# Patient Record
Sex: FEMALE | Race: WHITE | NOT HISPANIC OR LATINO | Employment: FULL TIME | URBAN - METROPOLITAN AREA
[De-identification: names, ages, dates, MRNs, and addresses within clinical notes are randomized per-mention and may not be internally consistent; named-entity substitution may affect disease eponyms.]

---

## 2019-12-18 ENCOUNTER — OFFICE VISIT (OUTPATIENT)
Dept: DERMATOLOGY | Facility: CLINIC | Age: 29
End: 2019-12-18
Payer: COMMERCIAL

## 2019-12-18 DIAGNOSIS — Z13.89 SCREENING FOR SKIN CONDITION: ICD-10-CM

## 2019-12-18 DIAGNOSIS — D22.9 NEVUS: Primary | ICD-10-CM

## 2019-12-18 PROCEDURE — 99203 OFFICE O/P NEW LOW 30 MIN: CPT | Performed by: DERMATOLOGY

## 2019-12-18 RX ORDER — CHOLECALCIFEROL (VITAMIN D3) 125 MCG
5 CAPSULE ORAL
COMMUNITY

## 2019-12-18 RX ORDER — PREDNISONE 1 MG/1
TABLET ORAL
COMMUNITY
Start: 2018-09-29

## 2019-12-18 RX ORDER — FLUOXETINE HYDROCHLORIDE 20 MG/1
CAPSULE ORAL
Refills: 30 | COMMUNITY
Start: 2019-11-22 | End: 2020-01-24 | Stop reason: ALTCHOICE

## 2019-12-18 RX ORDER — HYDROXYCHLOROQUINE SULFATE 200 MG/1
200 TABLET, FILM COATED ORAL
COMMUNITY
Start: 2018-09-29

## 2019-12-18 NOTE — PATIENT INSTRUCTIONS
Nevi reviewed the concept of ABCDE and ugly duckling nothing markedly atypical patient reassured    Patient does have some atypical nevi would monitor baseline photograph obtained lesion on the right arm appears to be more vascular nature will keep an eye on this if any further change re-evaluate  Screening for Dermatologic Disorders: Nothing else of concern noted on complete exam follow up in 1 year

## 2019-12-18 NOTE — PROGRESS NOTES
[facility] DERMATOLOGY  [address] [place] [address]  [facility] [address]  [phone]  [phone]     MRN: [id number] : 1990  Encounter: [id number]  Patient Information: Jocelyn Doty  Chief complaint:  Skin cancer checkup atypical mole    History of present illness:  28-year-old female with history of rheumatoid arthritis without previous history of skin cancer but history of lots of sun exposure presents for concerns for potential skin cancer  Patient notes having atypical moles on the back that was noted previously but also has noted a new spot that developed on her right upper arm  History reviewed  No pertinent past medical history  History reviewed  No pertinent surgical history  Social History   Social History     Substance and Sexual Activity   Alcohol Use Yes    Frequency: 2-3 times a week    Drinks per session: 1 or 2    Binge frequency: Never     Social History     Substance and Sexual Activity   Drug Use Not Currently     Social History     Tobacco Use   Smoking Status Never Smoker   Smokeless Tobacco Never Used     History reviewed  No pertinent family history  Meds/Allergies   Allergies   Allergen Reactions    Sulfa Antibiotics        Meds:  Prior to Admission medications    Medication Sig Start Date End Date Taking?  Authorizing Provider   FLUoxetine (PROzac) 20 mg capsule TK 1 C PO QD 19  Yes Historical Provider, MD   hydroxychloroquine (PLAQUENIL) 200 mg tablet Take 200 mg by mouth 18  Yes Historical Provider, MD   Melatonin 5 MG TABS Take 5 mg by mouth   Yes Historical Provider, MD   predniSONE 5 mg tablet TAKE 2-3 TABLETS BY MOUTH EVERY DAY IN THE MORNING FOR 2 TO 3 DAYS AS NEEDED 18  Yes Historical Provider, MD       Subjective:     Review of Systems:    General: negative for - chills, fatigue, fever,  weight gain or weight loss  Psychological: negative for - anxiety, behavioral disorder, concentration difficulties, decreased libido, depression, irritability, memory difficulties, mood swings, sleep disturbances or suicidal ideation  ENT: negative for - hearing difficulties , nasal congestion, nasal discharge, oral lesions, sinus pain, sneezing, sore throat  Allergy and Immunology: negative for - hives, insect bite sensitivity,  Hematological and Lymphatic: negative for - bleeding problems, blood clots,bruising, swollen lymph nodes  Endocrine: negative for - hair pattern changes, hot flashes, malaise/lethargy, mood swings, palpitations, polydipsia/polyuria, skin changes, temperature intolerance or unexpected weight change  Respiratory: negative for - cough, hemoptysis, orthopnea, shortness of breath, or wheezing  Cardiovascular: negative for - chest pain, dyspnea on exertion, edema,  Gastrointestinal: negative for - abdominal pain, nausea/vomiting  Genito-Urinary: negative for - dysuria, incontinence, irregular/heavy menses or urinary frequency/urgency  Musculoskeletal: negative for - gait disturbance, joint pain, joint stiffness, joint swelling, muscle pain, muscular weakness  Dermatological:  As in HPI  Neurological: negative for confusion, dizziness, headaches, impaired coordination/balance, memory loss, numbness/tingling, seizures, speech problems, tremors or weakness       Objective: There were no vitals taken for this visit      Physical Exam:    General Appearance:    Alert, cooperative, no distress   Head:    Normocephalic, without obvious abnormality, atraumatic           Skin:   A full skin exam was performed including scalp, head scalp, eyes, ears, nose, lips, neck, chest, axilla, abdomen, back, buttocks, bilateral upper extremities, bilateral lower extremities, hands, feet, fingers, toes, fingernails, and toenails several pigmented lesions on some larger than 6 mm size on the back with splaying of pigmentation photograph taken numerous other small pigmented lesions all pretty much regular shape and color nothing markedly atypical lesion on the right upper arm appears to be a 3 mm red macule which blanches on pressure nothing else of concern noted           Assessment:     1  Nevus     2  Screening for skin condition           Plan:   Nevi reviewed the concept of ABCDE and ugly duckling nothing markedly atypical patient reassured  Patient does have some atypical nevi would monitor baseline photograph obtained lesion on the right arm appears to be more vascular nature will keep an eye on this if any further change re-evaluate  Screening for Dermatologic Disorders: Nothing else of concern noted on complete exam follow up in 1 year       Kolby Nunez MD  12/18/2019,2:21 PM    Portions of the record may have been created with voice recognition software   Occasional wrong word or "sound a like" substitutions may have occurred due to the inherent limitations of voice recognition software   Read the chart carefully and recognize, using context, where substitutions have occurred

## 2020-01-24 ENCOUNTER — OFFICE VISIT (OUTPATIENT)
Dept: FAMILY MEDICINE CLINIC | Facility: CLINIC | Age: 30
End: 2020-01-24
Payer: COMMERCIAL

## 2020-01-24 VITALS
HEIGHT: 63 IN | OXYGEN SATURATION: 97 % | TEMPERATURE: 98.9 F | SYSTOLIC BLOOD PRESSURE: 110 MMHG | DIASTOLIC BLOOD PRESSURE: 70 MMHG | WEIGHT: 173 LBS | HEART RATE: 96 BPM | BODY MASS INDEX: 30.65 KG/M2

## 2020-01-24 DIAGNOSIS — Z00.00 ANNUAL PHYSICAL EXAM: ICD-10-CM

## 2020-01-24 DIAGNOSIS — Z12.4 SCREENING FOR CERVICAL CANCER: ICD-10-CM

## 2020-01-24 DIAGNOSIS — F33.41 RECURRENT MAJOR DEPRESSIVE DISORDER, IN PARTIAL REMISSION (HCC): ICD-10-CM

## 2020-01-24 DIAGNOSIS — R09.82 PND (POST-NASAL DRIP): ICD-10-CM

## 2020-01-24 DIAGNOSIS — Z76.89 ENCOUNTER TO ESTABLISH CARE: Primary | ICD-10-CM

## 2020-01-24 DIAGNOSIS — M06.09 RHEUMATOID ARTHRITIS OF MULTIPLE SITES WITH NEGATIVE RHEUMATOID FACTOR (HCC): ICD-10-CM

## 2020-01-24 DIAGNOSIS — F41.9 ANXIETY: ICD-10-CM

## 2020-01-24 DIAGNOSIS — Z23 FLU VACCINE NEED: ICD-10-CM

## 2020-01-24 PROCEDURE — 99385 PREV VISIT NEW AGE 18-39: CPT | Performed by: NURSE PRACTITIONER

## 2020-01-24 RX ORDER — VILAZODONE HYDROCHLORIDE 10 MG/1
40 TABLET ORAL
COMMUNITY
End: 2020-02-07 | Stop reason: ALTCHOICE

## 2020-01-24 RX ORDER — FLUTICASONE PROPIONATE 50 MCG
1 SPRAY, SUSPENSION (ML) NASAL DAILY
Qty: 1 BOTTLE | Refills: 3 | Status: SHIPPED | OUTPATIENT
Start: 2020-01-24 | End: 2020-04-21 | Stop reason: SDUPTHER

## 2020-01-24 RX ORDER — TRAZODONE HYDROCHLORIDE 50 MG/1
TABLET ORAL
COMMUNITY
Start: 2020-01-17

## 2020-01-24 RX ORDER — CLONAZEPAM 0.5 MG/1
TABLET ORAL
COMMUNITY
Start: 2020-01-22 | End: 2022-03-01 | Stop reason: ALTCHOICE

## 2020-01-24 NOTE — PROGRESS NOTES
Patient is here to establish care  She was previously being seen in [place] by her family doctor she has a long history of mental health issues  Goes to [employer] [facility], seeing Tuan Mcmillan  For anxiety and depression  SI attempt in   She  Is currently not seeing a counselor but would like a recommendation for one  She was recently started on viibryd and having some sleeping and diarrhea issues  Takes plaquenil daily for RA-- has prednisone PRN  Sees Dr Ranjith Salazar for rheum  Has chronic sinus issues  Uses nasal sprays  Takes claritin  Had recent bloodwork done in October from [facility] by Dr Ranjith Salazar  Pap 10/2018      [facility] [address] [facility]    NAME: Martha Garcia  AGE: 30 y o  SEX: female  : 1990     DATE: 2020     Assessment and Plan:     Problem List Items Addressed This Visit        Musculoskeletal and Integument    Rheumatoid arthritis of multiple sites with negative rheumatoid factor [facility])       Patient is following with rheumatology and doing well currently  Continue current medications  Other    Anxiety       Continue follow-up with mental health care provider  Did put in referral for counseling  Relevant Orders    Ambulatory referral to Behavioral Health    Recurrent major depressive disorder, in partial remission ([facility] [address] )       Doing well on current medications  Continue follow-up with psych nurse practitioner           Relevant Medications    traZODone (DESYREL) 50 mg tablet    vilazodone (VIIBRYD) 10 mg tablet    Other Relevant Orders    Ambulatory referral to [facility]    BMI 30 0-30 9,adult      Other Visit Diagnoses     Encounter to establish care    -  Primary    Flu vaccine need        Annual physical exam        Screening for cervical cancer        Relevant Orders    Ambulatory referral to Obstetrics / Gynecology    PND (post-nasal drip)        Relevant Medications    fluticasone (FLONASE) 50 mcg/act nasal spray          Immunizations and preventive care screenings were discussed with patient today  Appropriate education was printed on patient's after visit summary  Counseling:  · Exercise: the importance of regular exercise/physical activity was discussed  Recommend exercise 3-5 times per week for at least 30 minutes  BMI Counseling: Body mass index is 30 65 kg/m²  The BMI is above normal  Nutrition recommendations include decreasing portion sizes, consuming healthier snacks and reducing intake of cholesterol  Exercise recommendations include moderate physical activity 150 minutes/week and exercising 3-5 times per week  No pharmacotherapy was ordered  Depression Screening and Follow-up Plan: Patient's depression screening was positive with a PHQ-2 score of 4  Their PHQ-9 score was 18  Continue regular follow-up with their mental health provider who is managing their mental health condition(s)  Return in about 1 year (around 2021)  Chief Complaint:     Chief Complaint   Patient presents with    [facility] Care    positive phq9      History of Present Illness:     Adult Annual Physical   Patient here for a comprehensive physical exam  The patient reports problems - needs counselor  Diet and Physical Activity  · Diet/Nutrition: well balanced diet  · Exercise: 3-4 times a week on average        Depression Screening  PHQ-9 Depression Screening    PHQ-9:    Frequency of the following problems over the past two weeks:       Little interest or pleasure in doing things:  2 - more than half the days  Feeling down, depressed, or hopeless:  2 - more than half the days  Trouble falling or staying asleep, or sleeping too much:  2 - more than half the days  Feeling tired or having little energy:  3 - nearly every day  Poor appetite or overeatin - several days  Feeling bad about yourself - or that you are a failure or have let yourself or your family down:  3 - nearly every day  Trouble concentrating on things, such as reading the newspaper or watching television:  3 - nearly every day  Moving or speaking so slowly that other people could have noticed  Or the opposite - being so fidgety or restless that you have been moving around a lot more than usual:  2 - more than half the days  Thoughts that you would be better off dead, or of hurting yourself in some way:  0 - not at all  PHQ-2 Score:  4  PHQ-9 Score:  18       General Health  · Sleep: sleeps well  · Hearing: normal - bilateral   · Vision: wears glasses  · Dental: regular dental visits  /GYN Health  · Last menstrual period: 12/25/2019  · Contraceptive method: barrier methods  · History of STDs?: yes- chlamydia, hpv  Review of Systems:     Review of Systems   Constitutional: Negative  HENT: Positive for congestion, postnasal drip, sinus pressure and sinus pain  Respiratory: Negative for chest tightness, shortness of breath and wheezing  Cardiovascular: Negative for chest pain and palpitations  Gastrointestinal: Positive for diarrhea  Negative for nausea  Genitourinary: Negative  Psychiatric/Behavioral: Positive for dysphoric mood and sleep disturbance  Negative for self-injury and suicidal ideas  The patient is nervous/anxious         Past Medical History:     Past Medical History:   Diagnosis Date    Rheumatoid arthritis ([facility] [address] )       Past Surgical History:     Past Surgical History:   Procedure Laterality Date    APPENDECTOMY      WISDOM TOOTH EXTRACTION        Social History:     Social History     Socioeconomic History    Marital status: /Civil Union     Spouse name: None    Number of children: None    Years of education: None    Highest education level: None   Occupational History    None   Social Needs    Financial resource strain: None    Food insecurity:     Worry: None     Inability: None    Transportation needs:     Medical: None     Non-medical: None   Tobacco Use    Smoking status: Never Smoker    Smokeless tobacco: Never Used   Substance and Sexual Activity    Alcohol use: Yes     Frequency: 2-3 times a week     Drinks per session: 1 or 2     Binge frequency: Never    Drug use: Not Currently    Sexual activity: None   Lifestyle    Physical activity:     Days per week: None     Minutes per session: None    Stress: None   Relationships    Social connections:     Talks on phone: None     Gets together: None     Attends Yazidi service: None     Active member of club or organization: None     Attends meetings of clubs or organizations: None     Relationship status: None    Intimate partner violence:     Fear of current or ex partner: None     Emotionally abused: None     Physically abused: None     Forced sexual activity: None   Other Topics Concern    None   Social History Narrative    None      Family History:     Family History   Problem Relation Age of Onset    Mental illness Mother     Depression Mother     Esophageal cancer Father       Current Medications:     Current Outpatient Medications   Medication Sig Dispense Refill    clonazePAM (KlonoPIN) 0 5 mg tablet TK 1/2 TO 1 T PO D PRA      hydroxychloroquine (PLAQUENIL) 200 mg tablet Take 200 mg by mouth      levonorgestrel (MIRENA) 20 MCG/24HR IUD 1 each by Intrauterine route once      Melatonin 5 MG TABS Take 5 mg by mouth      predniSONE 5 mg tablet TAKE 2-3 TABLETS BY MOUTH EVERY DAY IN THE MORNING FOR 2 TO 3 DAYS AS NEEDED      traZODone (DESYREL) 50 mg tablet       vilazodone (VIIBRYD) 10 mg tablet Take 40 mg by mouth daily with breakfast      fluticasone (FLONASE) 50 mcg/act nasal spray 1 spray into each nostril daily 1 Bottle 3     No current facility-administered medications for this visit  Allergies:      Allergies   Allergen Reactions    Sulfa Antibiotics       Physical Exam:     /70   Pulse 96   Temp 98 9 °F (37 2 °C)   Ht 5' 3" (1 6 m)   Wt 78 5 kg (173 lb)   SpO2 97%   BMI 30 65 kg/m²     Physical Exam   Constitutional: She is oriented to person, place, and time  She appears well-developed and well-nourished  No distress  HENT:   Head: Normocephalic  Right Ear: External ear normal    Left Ear: External ear normal    Nose: Nose normal    Mouth/Throat: Oropharynx is clear and moist  No oropharyngeal exudate  Eyes: Pupils are equal, round, and reactive to light  Conjunctivae and EOM are normal  Right eye exhibits no discharge  Left eye exhibits no discharge  Neck: Normal range of motion  Neck supple  Cardiovascular: Normal rate, regular rhythm and normal heart sounds  Pulmonary/Chest: Effort normal and breath sounds normal  No respiratory distress  She has no wheezes  She exhibits no tenderness  Abdominal: Soft  Bowel sounds are normal    Musculoskeletal: Normal range of motion  She exhibits no edema or tenderness  Lymphadenopathy:     She has no cervical adenopathy  Neurological: She is alert and oriented to person, place, and time  Skin: Skin is warm and dry  She is not diaphoretic  Psychiatric: She has a normal mood and affect  Vitals reviewed        Jermaine Cano, [address] [facility] [address] [facility]

## 2020-01-24 NOTE — PATIENT INSTRUCTIONS

## 2020-01-29 ENCOUNTER — TELEPHONE (OUTPATIENT)
Dept: PSYCHIATRY | Facility: CLINIC | Age: 30
End: 2020-01-29

## 2020-01-29 ENCOUNTER — TELEPHONE (OUTPATIENT)
Dept: FAMILY MEDICINE CLINIC | Facility: CLINIC | Age: 30
End: 2020-01-29

## 2020-01-29 NOTE — TELEPHONE ENCOUNTER
Behavorial Health Outpatient Intake Questions    Referred by: Viri Singer    Please advised interviewee that they need to answer all questions truthfully to allow for best care and any misrepresentations of information may affect their ability to be seen at this clinic   => Was this discussed? Yes     Behavorial Health Outpatient Intake History -     Presenting Problem (in patient's words): patient would like to start therapy    Has the patient ever seen or is currently seeing a psychiatrist? Yes   If yes who/when? Currently seeing [facility] sees an NP named Shruthi Zuniga    If seen as outpatient, have they been seen here (and by whom)? If not seen here, which provider(s) did the patient see and for how long? Has the patient ever seen or currently see a therapist? Yes If yes who/when? Last in 2006 does not recall providers name    Has a member of the patient's family been in therapy here? No  If yes, with whom? Has the patient been hospitalized for mental health? Yes   If yes, how long ago was last hospitalization and where was it? Summer of 2006 does not remember which hospital    Substance Abuse:No concerns of substance abuse are reported  Does the patient have ICM or CTT? No    Is the patient taking injectable psychiatric medications? No    => If yes, patient cannot be seen here  Communications  Are there any developmental disabilities? No    Does the patient have hearing impairment? No       History-    Has the patient served in the [place] [address]? No    If yes, have you had combat services? No    Was the patient activated into federal active duty as a member of the [employer] or reserve? No    Legal History-     Does the patient have any history of arrests, skilled nursing/correction time, or DUIs? No  If Yes-  1) What types of charges? 2) When were they last incarcerated? 3) Are they currently on parole or probation? Minor Child-    Who has custody of the child? Is there a custody agreement?      If there is a custody agreement remind parent that they must bring a copy to the first appt or they will not be seen  Intake Team, please check with provider before scheduling if flags come up such as:  - complex case  - legal history (other than DUI)  - communication barrier concerns are present  - if, in your judgment, this needs further review    ACCEPTED as a patient Yes  => Appointment Date:     Referred Elsewhere? No    Name of Insurance Co: [employer] ID#  Insurance Phone #  If ins is primary or secondary  If patient is a minor, parents information such as Name, D  O B of guarantor

## 2020-01-29 NOTE — TELEPHONE ENCOUNTER
----- Message from [address] [place] [address] sent at 1/29/2020  8:04 AM EST -----  Can you see if the patient has them and we can make copies?  ----- Message -----  From: Stephany Good MA  Sent: 1/28/2020   4:57 PM EST  To: SEFERINO Navarro    I was unable to locate labs done by [facility] for this pt   ----- Message -----  From: SEFERINO Miller  Sent: 1/24/2020   5:28 PM EST  To: Stephany Good MA    Can you please get recent blood work from [place] Dr Prentis Romberg from rheumatology in [place]

## 2020-02-06 ENCOUNTER — SOCIAL WORK (OUTPATIENT)
Dept: BEHAVIORAL/MENTAL HEALTH CLINIC | Facility: CLINIC | Age: 30
End: 2020-02-06
Payer: COMMERCIAL

## 2020-02-06 DIAGNOSIS — F33.41 RECURRENT MAJOR DEPRESSIVE DISORDER, IN PARTIAL REMISSION (HCC): ICD-10-CM

## 2020-02-06 DIAGNOSIS — F41.9 ANXIETY: ICD-10-CM

## 2020-02-06 PROCEDURE — 90791 PSYCH DIAGNOSTIC EVALUATION: CPT | Performed by: SOCIAL WORKER

## 2020-02-06 PROCEDURE — 1036F TOBACCO NON-USER: CPT | Performed by: SOCIAL WORKER
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
## 2020-02-06 NOTE — BH TREATMENT PLAN
Justin Ramachandran  1990       Date of Initial Treatment Plan: 02/06/2020   Date of Current Treatment Plan: 02/06/20    Treatment Plan Number 1     Strengths/Personal Resources for Self Care: good at training the dogs, educated, employed, good relationship with , insight, seeking assistance, taking medication  Diagnosis:   1  Anxiety  Ambulatory referral to Behavioral Health   2  Recurrent major depressive disorder, in partial remission [facility])  Ambulatory referral to [address] [place] [address] Avenue of Needs: increase self worth, body image issues, processing father passing, including images that are caught in her mind, processing childhood issues, communicating feelings better  Long Term Goal 1: help bring up the past without crying, having better sense of myself, and liking the way I look, moving past father's death    Target Date: 07/2020  Completion Date: TBD         Short Term Objectives for Goal 1: see objectives below    1) Brooke Connolly will use session to process childhood incidents, identify how they impact her today, and be able to recall without becoming upset  2) Brooke Connolly will use session to process her father's passing, specifically to address her positive and negative feelings about him  3)Angelica will use EMDR to address traumatic memory from her father's passing  4) Brooke Connolly will use session to address improved self worth and self esteem, being able to identify how childhood shaped your current belief of self, and begin to identify a more healthy belief  5) Brookeisabel Connolly will use session to address issues of body image, resulting in her being able to purchase clothes without becoming overwhelmed  GOAL 1: Modality: Individual 2x per month   Completion Date TBD and The person(s) responsible for carrying out the plan is  Brooke Connolly and Marco Vergara    Methods used will be CBT, insight oriented therapy, mindfulness, EMDR, imaginal nurturing, DBT skills       Behavioral Health Treatment Plan [facility]: Diagnosis and Treatment Plan explained to Parker Alejo relates understanding diagnosis and is agreeable to Treatment Plan  Client Comments : Please share your thoughts, feelings, need and/or experiences regarding your treatment plan: Mer Perla actively participated in treatment planning and felt it addressed issues

## 2020-02-07 ENCOUNTER — OFFICE VISIT (OUTPATIENT)
Dept: OBGYN CLINIC | Facility: CLINIC | Age: 30
End: 2020-02-07
Payer: COMMERCIAL

## 2020-02-07 VITALS
SYSTOLIC BLOOD PRESSURE: 104 MMHG | BODY MASS INDEX: 30.51 KG/M2 | WEIGHT: 172.2 LBS | DIASTOLIC BLOOD PRESSURE: 80 MMHG | HEIGHT: 63 IN

## 2020-02-07 DIAGNOSIS — Z12.4 SCREENING FOR CERVICAL CANCER: ICD-10-CM

## 2020-02-07 DIAGNOSIS — Z01.419 ENCOUNTER FOR GYNECOLOGICAL EXAMINATION WITHOUT ABNORMAL FINDING: Primary | ICD-10-CM

## 2020-02-07 DIAGNOSIS — Z30.09 ENCOUNTER FOR OTHER GENERAL COUNSELING OR ADVICE ON CONTRACEPTION: ICD-10-CM

## 2020-02-07 PROBLEM — Z30.011 ENCOUNTER FOR INITIAL PRESCRIPTION OF CONTRACEPTIVE PILLS: Status: ACTIVE | Noted: 2020-02-07

## 2020-02-07 PROBLEM — Z30.432 ENCOUNTER FOR REMOVAL OF INTRAUTERINE CONTRACEPTIVE DEVICE (IUD): Status: ACTIVE | Noted: 2020-02-07

## 2020-02-07 PROCEDURE — S0610 ANNUAL GYNECOLOGICAL EXAMINA: HCPCS | Performed by: NURSE PRACTITIONER

## 2020-02-07 PROCEDURE — G0145 SCR C/V CYTO,THINLAYER,RESCR: HCPCS | Performed by: NURSE PRACTITIONER

## 2020-02-07 PROCEDURE — 3008F BODY MASS INDEX DOCD: CPT | Performed by: NURSE PRACTITIONER

## 2020-02-07 RX ORDER — VILAZODONE HYDROCHLORIDE 20 MG/1
TABLET ORAL
COMMUNITY
Start: 2020-01-22 | End: 2021-02-26 | Stop reason: ALTCHOICE

## 2020-02-07 NOTE — PROGRESS NOTES
Diagnoses and all orders for this visit:    1  Encounter for gynecological examination without abnormal finding  -     Liquid-based pap, screening; Future  -     Liquid-based pap, screening  Pap collected today, discussed new guidelines  Healthy eating and nutrition, daily exercise discussed and SBE reinforced  Call with any issues and all questions and concerns addressed  2  Encounter for other general counseling or advice on contraception  Will send request for Carola to nursing staff, pt will RTO when Charlyne Corpus is approved and ready in office of pt's choice  No need to wait for menses, had Mirena IUD in place, can schedule insertion right away  Ibuprofen 600 mg 30 minutes before insertion  Patient made aware call always switch to [facility] IUD in the future if periods become bothersome and weight loss has occured  3  Screening for cervical cancer  -     Ambulatory referral to Obstetrics / Gynecology    Other orders  -     VIIBRYD 20 MG tablet  -     Cancel: Iud removal      All questions and concerns answered  Patient to call with any questions  Pleasant 35 y o  nulliparous, premenopausal female here for new patient annual exam  She denies any issues with bleeding or her menses  Reports regular cycles  She has a history of abnormal pap smears, LGSIL in 2011, then negative afterwards  Last Pap unsure,  so pap today  Denies vaginal, urinary or breast issues, today  Denies pelvic pain  She c/o weight gain with her Mirena IUD and would like to switch to another IUD  We discuss other options, she does not have any history of heavy or painful periods and is aware of switching to a lower hormonal level periods will return and she is ok with this  She has a history of R  A and otherwise states she and her  are not planning on having any children at all, she would also consider having a tubal as a secondary option  Sexually active without any concerns and pt declines STD testing     Denies F/C/N/V      Past Medical History:   Diagnosis Date    Abnormal Pap smear of cervix     Chlamydia     Rheumatoid arthritis ([facility] [address] )     Varicella      Past Surgical History:   Procedure Laterality Date    APPENDECTOMY      WISDOM TOOTH EXTRACTION       Family History   Problem Relation Age of Onset    Mental illness Mother     Depression Mother    Meri Sang Rheum arthritis Mother     Esophageal cancer Father     Mental illness Sister     Rheum arthritis Sister     Mental illness Sister     Rheum arthritis Sister     Breast cancer Neg Hx     Colon cancer Neg Hx     Ovarian cancer Neg Hx     Cervical cancer Neg Hx     Uterine cancer Neg Hx      Social History     Tobacco Use    Smoking status: Never Smoker    Smokeless tobacco: Never Used   Substance Use Topics    Alcohol use: Yes     Frequency: 2-3 times a week     Drinks per session: 1 or 2     Binge frequency: Never    Drug use: Not Currently       Current Outpatient Medications:     clonazePAM (KlonoPIN) 0 5 mg tablet, TK 1/2 TO 1 T PO D PRA, Disp: , Rfl:     fluticasone (FLONASE) 50 mcg/act nasal spray, 1 spray into each nostril daily, Disp: 1 Bottle, Rfl: 3    hydroxychloroquine (PLAQUENIL) 200 mg tablet, Take 200 mg by mouth, Disp: , Rfl:     levonorgestrel (MIRENA) 20 MCG/24HR IUD, 1 each by Intrauterine route once, Disp: , Rfl:     Melatonin 5 MG TABS, Take 5 mg by mouth, Disp: , Rfl:     predniSONE 5 mg tablet, TAKE 2-3 TABLETS BY MOUTH EVERY DAY IN THE MORNING FOR 2 TO 3 DAYS AS NEEDED, Disp: , Rfl:     traZODone (DESYREL) 50 mg tablet, , Disp: , Rfl:     VIIBRYD 20 MG tablet, , Disp: , Rfl:   Patient Active Problem List    Diagnosis Date Noted    Encounter for gynecological examination without abnormal finding 02/07/2020    Encounter for removal of intrauterine contraceptive device (IUD) 02/07/2020    Encounter for initial prescription of contraceptive pills 02/07/2020    Screening for cervical cancer 02/07/2020    Anxiety 01/24/2020  Recurrent major depressive disorder, in partial remission ([facility] ) 2020    Rheumatoid arthritis of multiple sites with negative rheumatoid factor ([facility] ) 2020    BMI 30 0-30 9,adult 2020       Allergies   Allergen Reactions    Sulfa Antibiotics Rash     She is , works as a   OB History    Para Term  AB Living   0 0 0 0 0 0   SAB TAB Ectopic Multiple Live Births   0 0 0 0 0       Vitals:    20 0808   BP: 104/80   BP Location: Left arm   Patient Position: Sitting   Cuff Size: Standard   Weight: 78 1 kg (172 lb 3 2 oz)   Height: 5' 3" (1 6 m)     Body mass index is 30 5 kg/m²  Review of Systems   Constitutional: Negative for chills, fatigue, fever and unexpected weight change  Respiratory: Negative for shortness of breath  Gastrointestinal: Negative for anal bleeding, blood in stool, constipation and diarrhea  Genitourinary: Negative for difficulty urinating, dysuria and hematuria  OBGyn Exam    Physical Exam   Constitutional: She appears well-developed and well-nourished  No distress  Head: Normocephalic  Neck: Normal range of motion  Neck supple  Pulmonary: Effort normal   Breasts: bilateral without masses, skin changes or nipple discharge  Bilaterally soft and warm to touch  No areas of erythema or pain  Abdominal: Soft  Non-tender  Pelvic exam was performed with patient supine  No labial fusion  There is no rash, tenderness, lesion or injury on the right labia  There is no rash, tenderness, lesion or injury on the left labia  Uterus is not deviated, not enlarged, not fixed and not tender  Cervix exhibits no motion tenderness, no discharge and no friability, black IUD string in OS  Right adnexum displays no mass, no tenderness and no fullness  Left adnexum displays no mass, no tenderness and no fullness  No erythema or tenderness in the vagina  No foreign body in the vagina  No signs of injury around the vagina   No vaginal discharge found  PAP collected w/o difficulty  Lymphadenopathy:        Right: No inguinal adenopathy present          Left: No inguinal adenopathy present

## 2020-02-07 NOTE — PATIENT INSTRUCTIONS
Pap Smear   GENERAL INFORMATION:   What is a Pap smear? A Pap smear, or Pap test, is a procedure to check your cervix for abnormal cells  The cervix is the narrow opening at the bottom of your uterus  The cervix meets the top part of the vagina  How do I prepare for a Pap smear? The best time to schedule the test is right after your period stops  Do not have a Pap smear during your monthly period  Do not have intercourse or put anything in your vagina for 24 hours before your test    What will happen during a Pap smear? · You will lie on your back and place your feet on footrests called stirrups  Your caregiver will gently insert a device called a speculum into your vagina  The speculum is used to spread the walls of your vagina so he can see your cervix  He will use a thin brush or cotton swab to collect cells from the inside of your cervix  · Your caregiver will also collect cells from the surface of your cervix with a plastic or wooden tool called a spatula  He may also gently scrape the upper part of your vagina for a sample  The samples are placed in a container with liquid or on a glass slide  They are sent to a lab and examined for abnormal cells  How often do I need a Pap smear? Pap smears are usually done every 1 to 3 years  You may need a Pap smear more often if you have any of the following:  · Positive test result for the human papillomavirus (HPV)    · Cervical intraepithelial neoplasm or cervical cancer    · HIV    · A weak immune system    · Exposure to diethylstilbestrol (CHEYANNE) medicine when your mother was pregnant with you  CARE AGREEMENT:   You have the right to help plan your care  Learn about your health condition and how it may be treated  Discuss treatment options with your caregivers to decide what care you want to receive  You always have the right to refuse treatment  The above information is an  only   It is not intended as medical advice for individual conditions or treatments  Talk to your doctor, nurse or pharmacist before following any medical regimen to see if it is safe and effective for you  © 2014 [address] is for End User's use only and may not be sold, redistributed or otherwise used for commercial purposes  All illustrations and images included in CareNotes® are the copyrighted property of A D A M , Inc  or Wilfrid Morales

## 2020-02-07 NOTE — PROGRESS NOTES
Diagnoses and all orders for this visit:    Encounter for gynecological examination without abnormal finding  -     Liquid-based pap, screening; Future    Screening for cervical cancer  -     Ambulatory referral to Obstetrics / Gynecology    Other orders  -     VIIBRYD 20 MG tablet          All questions and concerns answered  Patient to call with any questions  Pleasant 35 y o  premenopausal female here for annual exam  She denies any issues with bleeding or her menses  Reports regular cycles  Denies history of abnormal pap smears  Last Pap   , no pap today  Denies vaginal, urinary or breast issues, today  Denies pelvic pain  Denies any issues with her BCM, which is    Sexually active without any concerns and pt requests/declines STD testing including/excluding blood work  Denies F/C/N/V      Past Medical History:   Diagnosis Date    Abnormal Pap smear of cervix     Chlamydia     Rheumatoid arthritis ([address] )     Varicella      Past Surgical History:   Procedure Laterality Date    APPENDECTOMY      WISDOM TOOTH EXTRACTION       Family History   Problem Relation Age of Onset    Mental illness Mother     Depression Mother    [facility] Rheum arthritis Mother     Esophageal cancer Father     Mental illness Sister     Rheum arthritis Sister     Mental illness Sister     Rheum arthritis Sister     Breast cancer Neg Hx     Colon cancer Neg Hx     Ovarian cancer Neg Hx     Cervical cancer Neg Hx     Uterine cancer Neg Hx      Social History     Tobacco Use    Smoking status: Never Smoker    Smokeless tobacco: Never Used   Substance Use Topics    Alcohol use: Yes     Frequency: 2-3 times a week     Drinks per session: 1 or 2     Binge frequency: Never    Drug use: Not Currently       Current Outpatient Medications:     clonazePAM (KlonoPIN) 0 5 mg tablet, TK 1/2 TO 1 T PO D PRA, Disp: , Rfl:     fluticasone (FLONASE) 50 mcg/act nasal spray, 1 spray into each nostril daily, Disp: 1 Bottle, Rfl: 3   hydroxychloroquine (PLAQUENIL) 200 mg tablet, Take 200 mg by mouth, Disp: , Rfl:     levonorgestrel (MIRENA) 20 MCG/24HR IUD, 1 each by Intrauterine route once, Disp: , Rfl:     Melatonin 5 MG TABS, Take 5 mg by mouth, Disp: , Rfl:     predniSONE 5 mg tablet, TAKE 2-3 TABLETS BY MOUTH EVERY DAY IN THE MORNING FOR 2 TO 3 DAYS AS NEEDED, Disp: , Rfl:     traZODone (DESYREL) 50 mg tablet, , Disp: , Rfl:     VIIBRYD 20 MG tablet, , Disp: , Rfl:   Patient Active Problem List    Diagnosis Date Noted    Anxiety 2020    Recurrent major depressive disorder, in partial remission ([place] [address] ) 2020    Rheumatoid arthritis of multiple sites with negative rheumatoid factor ([place] [address] ) 2020    BMI 30 0-30 9,adult 2020       Allergies   Allergen Reactions    Sulfa Antibiotics Rash       OB History    Para Term  AB Living   0 0 0 0 0 0   SAB TAB Ectopic Multiple Live Births   0 0 0 0 0       Vitals:    20 0808   BP: 104/80   BP Location: Left arm   Patient Position: Sitting   Cuff Size: Standard   Weight: 78 1 kg (172 lb 3 2 oz)   Height: 5' 3" (1 6 m)     Body mass index is 30 5 kg/m²  Review of Systems   Constitutional: Negative for chills, fatigue, fever and unexpected weight change  Respiratory: Negative for shortness of breath  Gastrointestinal: Negative for anal bleeding, blood in stool, constipation and diarrhea  Genitourinary: Negative for difficulty urinating, dysuria and hematuria  OBGyn Exam    Physical Exam   Constitutional: She appears well-developed and well-nourished  No distress  Head: Normocephalic  Neck: Normal range of motion  Neck supple  Pulmonary: Effort normal   Breasts: bilateral without masses, skin changes or nipple discharge  Bilaterally soft and warm to touch  No areas of erythema or pain  Abdominal: Soft  Non-tender  Pelvic exam was performed with patient supine  No labial fusion   There is no rash, tenderness, lesion or injury on the right labia  There is no rash, tenderness, lesion or injury on the left labia  Uterus is not deviated, not enlarged, not fixed and not tender  Cervix exhibits no motion tenderness, no discharge and no friability  Right adnexum displays no mass, no tenderness and no fullness  Left adnexum displays no mass, no tenderness and no fullness  No erythema or tenderness in the vagina  No foreign body in the vagina  No signs of injury around the vagina  No vaginal discharge found  PAP  Marleta Press Lymphadenopathy:        Right: No inguinal adenopathy present          Left: No inguinal adenopathy present    Iud removal  Date/Time: 2/7/2020 8:22 AM  Performed by: SEFERINO Fuentes  Authorized by: SEFERINO Fuentes     Consent:     Consent obtained:  Verbal    Consent given by:  Patient    Procedure risks and benefits discussed: yes      Patient questions answered: yes      Patient agrees, verbalizes understanding, and wants to proceed: yes      Educational handouts given: no      Instructions and paperwork completed: no    Procedure:     Removed with no complications: yes      Removal due to mechanical complications of IUD: yes      Removal due to infection and inflammatory reaction: no      Other reason for removal:  Wants to switch back to [facility]

## 2020-02-11 LAB
LAB AP GYN PRIMARY INTERPRETATION: NORMAL
Lab: NORMAL

## 2020-02-21 ENCOUNTER — SOCIAL WORK (OUTPATIENT)
Dept: BEHAVIORAL/MENTAL HEALTH CLINIC | Facility: CLINIC | Age: 30
End: 2020-02-21
Payer: COMMERCIAL

## 2020-02-21 DIAGNOSIS — F41.9 ANXIETY: Primary | ICD-10-CM

## 2020-02-21 DIAGNOSIS — F33.41 RECURRENT MAJOR DEPRESSIVE DISORDER, IN PARTIAL REMISSION (HCC): ICD-10-CM

## 2020-02-21 PROCEDURE — 90834 PSYTX W PT 45 MINUTES: CPT | Performed by: SOCIAL WORKER

## 2020-02-21 NOTE — PSYCH
Psychotherapy Provided: Individual Psychotherapy 45 minutes     Length of time in session: 45 minutes, follow up in 2 week    Goals addressed in session: Goal 1     Pain:      none    1    Current suicide risk : Low     Adali Du reported on continued anxiety issues specifically surrounding her job, the lack of ike funding for the month of May, the possibility the ike will not be renewed and the possibility of having a job through [employer], however that is very tenuous  This has left her extremely anxious about her employment, and therefore sustaining her life  She also discussed that her coworker who is also her friend accepted another position, so 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 which also decreases the quality of her employment  Session focused solely on anxiety management  She was introduced to several mindfulness techniques through use of apps, youSolavistaube, using the wheel of awareness, STOP basic mindfulness, breathe work , and how to have a daily practice of several minutes, several times a day  She was educated on how the anxiety manifests in the brain, and how she can begin to be aware of when her anxiety is increased and begin to implement the aforementioned activities both in response to anxiety and as a means of lengthening her ability to sustain calmness  She agreed to try these  She also gave more information on how she and Cristofer Andrews navigate their schedules, the hope he will move to first shift in the coming year as 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

## 2020-02-24 ENCOUNTER — TELEPHONE (OUTPATIENT)
Dept: OBGYN CLINIC | Facility: CLINIC | Age: 30
End: 2020-02-24

## 2020-02-24 NOTE — TELEPHONE ENCOUNTER
----- Message from Brianna Meyer sent at 2/24/2020  5:11 PM EST -----  Regarding: Visit Follow-Up Question  Contact: [phone]  Geoff Preciado, I was wondering if you had an idea of when my new IUD Radha Felling? Carola? I can't remember which) would be in? Just wanted to check in on that, thank you!

## 2020-02-25 NOTE — TELEPHONE ENCOUNTER
Buy and michael Srivastava labeled and will be brought to [place] office  Pt can be scheduled with Av Byers at any time for Mirena removal/Carola insertion

## 2020-03-06 ENCOUNTER — PROCEDURE VISIT (OUTPATIENT)
Dept: OBGYN CLINIC | Facility: CLINIC | Age: 30
End: 2020-03-06
Payer: COMMERCIAL

## 2020-03-06 VITALS — DIASTOLIC BLOOD PRESSURE: 76 MMHG | WEIGHT: 173.2 LBS | SYSTOLIC BLOOD PRESSURE: 118 MMHG | BODY MASS INDEX: 30.68 KG/M2

## 2020-03-06 DIAGNOSIS — Z30.432 ENCOUNTER FOR REMOVAL OF INTRAUTERINE CONTRACEPTIVE DEVICE (IUD): ICD-10-CM

## 2020-03-06 DIAGNOSIS — Z30.430 ENCOUNTER FOR IUD INSERTION: ICD-10-CM

## 2020-03-06 PROCEDURE — 58301 REMOVE INTRAUTERINE DEVICE: CPT | Performed by: NURSE PRACTITIONER

## 2020-03-06 PROCEDURE — 58300 INSERT INTRAUTERINE DEVICE: CPT | Performed by: NURSE PRACTITIONER

## 2020-03-06 NOTE — PROGRESS NOTES
Iud removal  Date/Time: 3/6/2020 2:18 PM  Performed by: SEFERINO Roy  Authorized by: SEFERINO Roy     Consent:     Consent obtained:  Verbal    Consent given by:  Patient    Procedure risks and benefits discussed: yes      Patient questions answered: yes      Patient agrees, verbalizes understanding, and wants to proceed: yes    Procedure:     Removed with no complications: yes      Other reason for removal:  Removed due to weight gaining concerns  Comments:      Pleasant 35 y o patient presents today for Mirena IUD removal and Carola insertion  She c/o weight gain issues since Mirena was inserted, but likes using the IUD for contraception  Will try lowest hormonal option, can always change to Delle Weems if periods are too heavy as well  Iud insertions  Date/Time: 3/6/2020 2:22 PM  Performed by: SEFERINO Roy  Authorized by: SEFERINO Roy     Consent:     Consent obtained:  Written    Consent given by:  Patient    Procedure risks and benefits discussed: yes      Patient questions answered: yes      Patient agrees, verbalizes understanding, and wants to proceed: yes      Instructions and paperwork completed: yes    Procedure:     Pelvic exam performed: yes      Negative urine pregnancy test: yes      Cervix cleaned and prepped: yes      Speculum placed in vagina: yes      Tenaculum applied to cervix: yes      Uterus sounded: yes      Uterus sound depth (cm):  7    IUD inserted with no complications: yes      IUD type:  Carola    Strings trimmed: yes    Post-procedure:     Patient tolerated procedure well: yes      Patient will follow up after next period: yes    Comments:      Patient will F/U in 6 weeks, no bleeding noted after insertion or at tenaculum site  Denies sticking, poking pain, no cramping at this time  Can take ibuprofen for cramping if needed  All questions and concerns answered  Call with any problems

## 2020-03-06 NOTE — PATIENT INSTRUCTIONS
Intrauterine Device   WHAT YOU NEED TO KNOW:   An intrauterine device (IUD) is a type of birth control that is inserted into your uterus  It is a small, flexible piece of plastic with a string on the end  It is inserted and removed by your healthcare provider  IUDs prevent sperm from reaching or fertilizing an egg  IUDs also prevent a fertilized egg from attaching to the uterus and developing into a fetus  DISCHARGE INSTRUCTIONS:   Make sure your IUD is in place: An IUD has a string that is made of plastic thread  One to 2 inches of this string hangs into your vagina  You cannot see this string, and it will not cause problems when you have sex  Check your IUD string every 3 days for the first 3 months that you have your IUD  After that, check the string after each monthly period  Do the following to check the placement of your IUD:  · Wash your hands with soap and warm water  Dry them with a clean towel  · Bend your knees and squat low to the ground  · Gently put your index finger inside your vagina  The cervix is at the top of the vagina and feels like the tip of your nose  Feel for the IUD string  Do not pull on the string  You should not be able to feel the firm plastic of the IUD itself  · Wash your hands after you check your IUD string  For more information:   · [facility] [address] , [address]  Phone: 4- 983 - [phone]  Web Address: https://[employer] [place]  org  Follow up with your healthcare provider as directed:  Write down your questions so you remember to ask them during your visits  Contact your healthcare provider if:   · You think you are pregnant  · You bleed after you have sex  · You have pain during sex  · You cannot feel the IUD string, the string feels longer, or you feel the plastic of the IUD itself  · You have vaginal discharge that is green, yellow, or has a foul odor      · You have questions or concerns about your condition or care  Seek care immediately if:   · You have severe pain or bleeding during your period  · You have a fever and severe abdominal pain  © 2017 [address] [employer] Information is for End User's use only and may not be sold, redistributed or otherwise used for commercial purposes  All illustrations and images included in CareNotes® are the copyrighted property of A D A M , Inc  or Wilfrid Morales  The above information is an  only  It is not intended as medical advice for individual conditions or treatments  Talk to your doctor, nurse or pharmacist before following any medical regimen to see if it is safe and effective for you

## 2020-03-13 ENCOUNTER — SOCIAL WORK (OUTPATIENT)
Dept: BEHAVIORAL/MENTAL HEALTH CLINIC | Facility: CLINIC | Age: 30
End: 2020-03-13
Payer: COMMERCIAL

## 2020-03-13 DIAGNOSIS — F33.41 RECURRENT MAJOR DEPRESSIVE DISORDER, IN PARTIAL REMISSION (HCC): ICD-10-CM

## 2020-03-13 DIAGNOSIS — F41.9 ANXIETY: Primary | ICD-10-CM

## 2020-03-13 PROCEDURE — 90834 PSYTX W PT 45 MINUTES: CPT | Performed by: SOCIAL WORKER

## 2020-03-13 NOTE — PSYCH
Psychotherapy Provided: Individual Psychotherapy 45 minutes     Length of time in session: 45 minutes, follow up in 2 week    Goals addressed in session: Goal 1     Pain:      none    0    Current suicide risk : Low     Gaspar Nyhan reported the funding for her job has been approved now it is a issue of the process, being completed to move her from the ike to [facility]  She also reported that when her work mate leaves the position the original plan of having the part time person as been replaced with having a former employee return who is someone Gaspar Nyhan gets along well with  We also checked in regarding any anxiety she has around her job being closed due to health reasons, so far this has not been an issue however they have been discussing the possibility  She and her  continue to have difficulty spending time together due to conflicting work schedules and now he is taking more overtime which assists financially however leaves them little time to catch up and connect  Her dog is also having separation anxiety which causes more stress in the home  She has not used any of the anxiety techniques from last session as she was away with her job and just cannot seem to fit it in to her day  Session focused on how she can employ even the simplest of mindfulness, using breathing along with going through the five senses, her thoughts and feelings, just twice a day to check in with her self and then be able to use a quick self soothing mechanism if needed  We reviewed how she can use this, and potential times in her day when she can employ it  We discussed the potential benefit even just for several minutes twice daily to assist with overall reduction of anxiety  She agreed and will try this  The remainder of session was used to process her feelings around the mounting stress and lack of calm due to national events, politics and her fears for the future   We discussed how she can address these fears, which she has no control over, how she can be proactive in voting, participating in getting others to vote, and practicing good hygenie to fend off illness  She will continue biweekly  Behavioral Health Treatment Plan [facility]: Diagnosis and Treatment Plan explained to Vincent Magallanes relates understanding diagnosis and is agreeable to Treatment Plan   Yes

## 2020-03-27 ENCOUNTER — TELEMEDICINE (OUTPATIENT)
Dept: BEHAVIORAL/MENTAL HEALTH CLINIC | Facility: CLINIC | Age: 30
End: 2020-03-27
Payer: COMMERCIAL

## 2020-03-27 DIAGNOSIS — F33.41 RECURRENT MAJOR DEPRESSIVE DISORDER, IN PARTIAL REMISSION (HCC): Primary | ICD-10-CM

## 2020-03-27 DIAGNOSIS — F41.1 GENERALIZED ANXIETY DISORDER: ICD-10-CM

## 2020-03-27 PROCEDURE — 90834 PSYTX W PT 45 MINUTES: CPT | Performed by: SOCIAL WORKER

## 2020-03-27 NOTE — PSYCH
Virtual Regular Visit    Problem List Items Addressed This Visit        Other    Recurrent major depressive disorder, in partial remission ([facility] [address] ) - Primary      Other Visit Diagnoses     Generalized anxiety disorder              [unfilled]    Reason for visit is therapy follow up    Encounter provider Kulwant Ayoub    Provider located at [facility]      [unfilled]     After connecting through [employer], the patient was identified by name and date of birth  Gloris Closs was informed that this is a telemedicine visit and that the visit is being conducted through [employer] which may not be secure and therefore, might not be HIPAA-compliant  My office door was closed  No one else was in the room  She acknowledged consent and understanding of privacy and security of the video platform  The patient has agreed to participate and understands they can discontinue the visit at any time  Subjective  Gloris Closs is a 35 y o  female  Anthony Dust reported on her shift to working at home, and how she is attempting to find some balance  We discussed for the first week she "walked around my house in a dream in my pajamas " She is now starting to develop some type of routine, but still finds she can be caught in the malaise of having limited activities and not leaving the home  She is immuno compromised due to her rheumatoid arthritis so she is being very careful  She discussed the difficult with food  as the stores often do not have enough supplies on the shelves, where they had little protien for days until they were able to get an order  Session focused on acknowledging the dramtic shift in life, which would inevitably produce feelings, specifically the loss of our daily routines, activites, and just being able to freely move about wihtout fear  We disucssed that she will need as most people are to attend to those feelings in order to begin to establish a new daily routine   We discussed the need to identify ways she can nurture herself physically, emotionally, mentally and spiritually if this is part of her life  She is trying to get outside more, complete yard work, which will maintain some of the work she was doing at [employer]  She is spending time with her dogs and cat, and was caught on the nintendo, but has since "broken the spell" and started to search out projects she had been working on but not completing due to her hectic schedule  Again she has not done much with mindfulness or the apps, we discussed having designated time in her day devoted to identifying and attending to her emotional needs  She will attempt this between sessions again  She will remain biweekly via video until health crisis subsides  Past Medical History:   Diagnosis Date    Abnormal Pap smear of cervix     Chlamydia     Rheumatoid arthritis ([facility] [address] )     Varicella        Past Surgical History:   Procedure Laterality Date    APPENDECTOMY      WISDOM TOOTH EXTRACTION         Current Outpatient Medications   Medication Sig Dispense Refill    clonazePAM (KlonoPIN) 0 5 mg tablet TK 1/2 TO 1 T PO D PRA      fluticasone (FLONASE) 50 mcg/act nasal spray 1 spray into each nostril daily 1 Bottle 3    hydroxychloroquine (PLAQUENIL) 200 mg tablet Take 200 mg by mouth      Melatonin 5 MG TABS Take 5 mg by mouth      predniSONE 5 mg tablet TAKE 2-3 TABLETS BY MOUTH EVERY DAY IN THE MORNING FOR 2 TO 3 DAYS AS NEEDED      traZODone (DESYREL) 50 mg tablet       VIIBRYD 20 MG tablet        No current facility-administered medications for this visit  Allergies   Allergen Reactions    Sulfa Antibiotics Rash       Review of Systems    Physical Exam     I spent 45 minutes with the patient during this visit

## 2020-04-10 ENCOUNTER — TELEMEDICINE (OUTPATIENT)
Dept: BEHAVIORAL/MENTAL HEALTH CLINIC | Facility: CLINIC | Age: 30
End: 2020-04-10
Payer: COMMERCIAL

## 2020-04-10 DIAGNOSIS — F33.41 RECURRENT MAJOR DEPRESSIVE DISORDER, IN PARTIAL REMISSION (HCC): Primary | ICD-10-CM

## 2020-04-10 DIAGNOSIS — F41.9 ANXIETY: ICD-10-CM

## 2020-04-10 PROCEDURE — 90834 PSYTX W PT 45 MINUTES: CPT | Performed by: SOCIAL WORKER

## 2020-04-21 DIAGNOSIS — R09.82 PND (POST-NASAL DRIP): ICD-10-CM

## 2020-04-21 RX ORDER — FLUTICASONE PROPIONATE 50 MCG
1 SPRAY, SUSPENSION (ML) NASAL DAILY
Qty: 1 BOTTLE | Refills: 0 | Status: SHIPPED | OUTPATIENT
Start: 2020-04-21 | End: 2021-02-08 | Stop reason: SDUPTHER

## 2020-04-24 ENCOUNTER — TELEMEDICINE (OUTPATIENT)
Dept: BEHAVIORAL/MENTAL HEALTH CLINIC | Facility: CLINIC | Age: 30
End: 2020-04-24
Payer: COMMERCIAL

## 2020-04-24 DIAGNOSIS — F41.9 ANXIETY: ICD-10-CM

## 2020-04-24 DIAGNOSIS — F33.41 RECURRENT MAJOR DEPRESSIVE DISORDER, IN PARTIAL REMISSION (HCC): Primary | ICD-10-CM

## 2020-04-24 PROCEDURE — 90834 PSYTX W PT 45 MINUTES: CPT | Performed by: SOCIAL WORKER

## 2020-05-08 ENCOUNTER — TELEMEDICINE (OUTPATIENT)
Dept: BEHAVIORAL/MENTAL HEALTH CLINIC | Facility: CLINIC | Age: 30
End: 2020-05-08
Payer: COMMERCIAL

## 2020-05-08 DIAGNOSIS — F33.41 RECURRENT MAJOR DEPRESSIVE DISORDER, IN PARTIAL REMISSION (HCC): Primary | ICD-10-CM

## 2020-05-08 DIAGNOSIS — F41.9 ANXIETY: ICD-10-CM

## 2020-05-08 PROCEDURE — 90834 PSYTX W PT 45 MINUTES: CPT | Performed by: SOCIAL WORKER

## 2020-06-05 ENCOUNTER — TELEMEDICINE (OUTPATIENT)
Dept: BEHAVIORAL/MENTAL HEALTH CLINIC | Facility: CLINIC | Age: 30
End: 2020-06-05
Payer: COMMERCIAL

## 2020-06-05 DIAGNOSIS — F41.9 ANXIETY: ICD-10-CM

## 2020-06-05 DIAGNOSIS — F33.41 RECURRENT MAJOR DEPRESSIVE DISORDER, IN PARTIAL REMISSION (HCC): Primary | ICD-10-CM

## 2020-06-05 PROCEDURE — 90834 PSYTX W PT 45 MINUTES: CPT | Performed by: SOCIAL WORKER

## 2020-06-19 ENCOUNTER — TELEMEDICINE (OUTPATIENT)
Dept: BEHAVIORAL/MENTAL HEALTH CLINIC | Facility: CLINIC | Age: 30
End: 2020-06-19
Payer: COMMERCIAL

## 2020-06-19 DIAGNOSIS — F41.9 ANXIETY: Primary | ICD-10-CM

## 2020-06-19 DIAGNOSIS — F33.41 RECURRENT MAJOR DEPRESSIVE DISORDER, IN PARTIAL REMISSION (HCC): ICD-10-CM

## 2020-06-19 PROCEDURE — 90834 PSYTX W PT 45 MINUTES: CPT | Performed by: SOCIAL WORKER

## 2020-07-10 ENCOUNTER — TELEMEDICINE (OUTPATIENT)
Dept: BEHAVIORAL/MENTAL HEALTH CLINIC | Facility: CLINIC | Age: 30
End: 2020-07-10
Payer: COMMERCIAL

## 2020-07-10 DIAGNOSIS — F33.41 RECURRENT MAJOR DEPRESSIVE DISORDER, IN PARTIAL REMISSION (HCC): Primary | ICD-10-CM

## 2020-07-10 DIAGNOSIS — F41.9 ANXIETY: ICD-10-CM

## 2020-07-10 PROCEDURE — 90834 PSYTX W PT 45 MINUTES: CPT | Performed by: SOCIAL WORKER

## 2020-07-10 PROCEDURE — 1036F TOBACCO NON-USER: CPT | Performed by: SOCIAL WORKER

## 2020-07-10 NOTE — PSYCH
Virtual Regular Visit      Assessment/Plan:    Problem List Items Addressed This Visit        Other    Anxiety    Recurrent major depressive disorder, in partial remission ([facility] [address] ) - Primary               Reason for visit is No chief complaint on file  Encounter provider Seferino Meyers    Provider located at [address] [place] [id number] [phone]      Recent Visits  No visits were found meeting these conditions  Showing recent visits within past 7 days and meeting all other requirements     Future Appointments  No visits were found meeting these conditions  Showing future appointments within next 150 days and meeting all other requirements        The patient was identified by name and date of birth  Magdy Simon was informed that this is a telemedicine visit and that the visit is being conducted through [employer]  My office door was closed  No one else was in the room  She acknowledged consent and understanding of privacy and security of the video platform  The patient has agreed to participate and understands they can discontinue the visit at any time  Patient is aware this is a billable service  Subjective  Magdy Simon is a 35 y o  female   Using her resource [place] of support  Elvin Wood reported on her friend's wedding, it was a lot of work prior to the wedding, they did have fun after the wedding  She further discussed the stressors of her work, and the issues with [employer], space and storage  She appeared tired, and frustrated  She has not used th resources from last session as she was so busy in the past several weeks  Session focused on providing support, and validation to Alyssabonnie Hernandezion who expressed being very frustrated, and burned out of her job, and how she has to perform it due to the health crisis   She also voiced frustration around how others are not wearing masks, fearing what will happen in early fall  We discussed she and Jenise Em can get some time away, they are considering this  Sara Carrillo also reported having a depressive episode, which she reported she struggled with for hours  She feels the medication is using working, but realizes she is very stressed with work, stating "my work is a shit show " She and Jenise Em have very little time together, which is also a stressor  We discussed the nature of not feeling important to anyone but her boss, and the climate of negativity that encompasses many in her job  We discussed the importance of self care  Sara Carrillo was casually dressed, fully oriented, made consistent eye contact, her speech was of normal rate and tone, her affect was sadder, with her mood reflecting other affect  Her thought process was logical and goal directed  She demonstrated good insight and judgement  She will continue biweekly, and continue self care, and using her stress management techniques  HPI     Past Medical History:   Diagnosis Date    Abnormal Pap smear of cervix     Chlamydia     [facility] [address] )     Varicella        Past Surgical History:   Procedure Laterality Date    APPENDECTOMY      WISDOM TOOTH EXTRACTION         Current Outpatient Medications   Medication Sig Dispense Refill    clonazePAM (KlonoPIN) 0 5 mg tablet TK 1/2 TO 1 T PO D PRA      fluticasone (FLONASE) 50 mcg/act nasal spray 1 spray into each nostril daily 1 Bottle 0    hydroxychloroquine (PLAQUENIL) 200 mg tablet Take 200 mg by mouth      Melatonin 5 MG TABS Take 5 mg by mouth      predniSONE 5 mg tablet TAKE 2-3 TABLETS BY MOUTH EVERY DAY IN THE MORNING FOR 2 TO 3 DAYS AS NEEDED      traZODone (DESYREL) 50 mg tablet       VIIBRYD 20 MG tablet        No current facility-administered medications for this visit           Allergies   Allergen Reactions    Sulfa Antibiotics Rash       Review of Systems    Video Exam    There were no vitals filed for this visit     Physical Exam     I spent 45 minutes directly with the patient during this visit      VIRTUAL VISIT DISCLAIMER    Gimlar Calderon acknowledges that she has consented to an online visit or consultation  She understands that the online visit is based solely on information provided by her, and that, in the absence of a face-to-face physical evaluation by the physician, the diagnosis she receives is both limited and provisional in terms of accuracy and completeness  This is not intended to replace a full medical face-to-face evaluation by the physician  Gilmar Calderon understands and accepts these terms

## 2020-07-15 DIAGNOSIS — M21.619 BUNION: Primary | ICD-10-CM

## 2020-07-15 DIAGNOSIS — Z01.00 EYE EXAM, ROUTINE: ICD-10-CM

## 2020-07-24 ENCOUNTER — TELEMEDICINE (OUTPATIENT)
Dept: BEHAVIORAL/MENTAL HEALTH CLINIC | Facility: CLINIC | Age: 30
End: 2020-07-24
Payer: COMMERCIAL

## 2020-07-24 DIAGNOSIS — F33.41 RECURRENT MAJOR DEPRESSIVE DISORDER, IN PARTIAL REMISSION (HCC): Primary | ICD-10-CM

## 2020-07-24 PROCEDURE — 90834 PSYTX W PT 45 MINUTES: CPT | Performed by: SOCIAL WORKER

## 2020-07-24 PROCEDURE — 1036F TOBACCO NON-USER: CPT | Performed by: SOCIAL WORKER

## 2020-07-24 NOTE — BH TREATMENT PLAN
Sam Kyle  1990       Date of Initial Treatment Plan: 02/06/2020   Date of Current Treatment Plan: 07/24/20    Treatment Plan Number 2     Strengths/Personal Resources for Self Care: takes good care of dogs, educated, employed, good relationship with , insight, seeking assistance, taking medication, competent with her job, and coordinating tasks  Diagnosis:   1  Recurrent major depressive disorder, in partial remission ([facility] [address] )         Area of Needs: increase self worth, body image issues, processing father passing, including images that are caught in her mind, processing childhood issues, communicating feelings better  Long Term Goal 1: help bring up the past without crying, having better sense of myself, and liking the way I look, moving past father's death, communicate better overall     Target Date: 12/24/2020  Completion Date: TBD         Short Term Objectives for Goal 1: See Objectives Below    1) Hammad Ayala will continue to use session to process childhood incidents, identify how they impact her today, and be able to recall without becoming upset  2) Hammad Ayala will continue to use session to process her father's passing, specifically to address her positive and negative feelings about him  3)Angelica will use EMDR to address traumatic memory from her father's passing  4) Hammad Ayala will continue to use session to address improved self worth and self esteem, being able to identify how childhood shaped your current belief of self, and begin to identify a more healthy belief  5) Hammad Ayala will use session to address issues of body image, resulting in her being able to purchase clothes without becoming overwhelmed       GOAL 1: Modality: Individual 2x per month   Completion Date TBD and The person(s) responsible for carrying out the plan is  Sam Kyle and [facility]    Modalities: CBT, behavior modification, insight oriented therapy, psychoeducation, mindfulness, EMDR, trauma informed care, problem solving skills, stress management skills, assertiveness skills,  guided imgery, emotional needs meeting  Behavioral Health Treatment Plan [facility]: Diagnosis and Treatment Plan explained to Yu Black relates understanding diagnosis and is agreeable to Treatment Plan  Client Comments : Please share your thoughts, feelings, need and/or experiences regarding your treatment plan: Sara Carrillo was able to identify her current needs
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
## 2020-09-09 ENCOUNTER — DOCUMENTATION (OUTPATIENT)
Dept: BEHAVIORAL/MENTAL HEALTH CLINIC | Facility: CLINIC | Age: 30
End: 2020-09-09

## 2020-09-09 DIAGNOSIS — F41.9 ANXIETY: Primary | ICD-10-CM

## 2020-09-09 DIAGNOSIS — F33.41 RECURRENT MAJOR DEPRESSIVE DISORDER, IN PARTIAL REMISSION (HCC): ICD-10-CM

## 2020-09-09 NOTE — PROGRESS NOTES
Assessment/Plan:      Diagnoses and all orders for this visit:    Anxiety    Recurrent major depressive disorder, in partial remission ([facility] [address] )          Subjective:     Patient ID: Elia Mills is a 35 y o  female  Outpatient Discharge Summary:   Admission Date: 02/06/2020  Cathy Mejía was referred by PCP  Discharge Date: 09/09/2020    Discharge Diagnosis:    1  Anxiety     2   Recurrent major depressive disorder, in partial remission [facility])         Treating Physician: Farhad Das LCSW  Treatment Complications: cancelled all upcoming appointments  Presenting Problem: depression, anxiety  Course of treatment includes:    individual therapy   Treatment Progress: good  Criteria for Discharge: Cathy Mejía cancelled upcoming appointments  Aftercare recommendations include Cathy Mejía may re-engage by calling office  Discharge Medications include:  Current Outpatient Medications:     clonazePAM (KlonoPIN) 0 5 mg tablet, TK 1/2 TO 1 T PO D PRA, Disp: , Rfl:     fluticasone (FLONASE) 50 mcg/act nasal spray, 1 spray into each nostril daily, Disp: 1 Bottle, Rfl: 0    hydroxychloroquine (PLAQUENIL) 200 mg tablet, Take 200 mg by mouth, Disp: , Rfl:     Melatonin 5 MG TABS, Take 5 mg by mouth, Disp: , Rfl:     predniSONE 5 mg tablet, TAKE 2-3 TABLETS BY MOUTH EVERY DAY IN THE MORNING FOR 2 TO 3 DAYS AS NEEDED, Disp: , Rfl:     traZODone (DESYREL) 50 mg tablet, , Disp: , Rfl:     VIIBRYD 20 MG tablet, , Disp: , Rfl:     Prognosis: good

## 2020-11-04 DIAGNOSIS — N39.0 URINARY TRACT INFECTION WITHOUT HEMATURIA, SITE UNSPECIFIED: Primary | ICD-10-CM

## 2020-11-04 RX ORDER — NITROFURANTOIN 25; 75 MG/1; MG/1
100 CAPSULE ORAL 2 TIMES DAILY
Qty: 10 CAPSULE | Refills: 0 | Status: SHIPPED | OUTPATIENT
Start: 2020-11-04 | End: 2020-11-09

## 2020-12-11 ENCOUNTER — SOCIAL WORK (OUTPATIENT)
Dept: BEHAVIORAL/MENTAL HEALTH CLINIC | Facility: CLINIC | Age: 30
End: 2020-12-11
Payer: COMMERCIAL

## 2020-12-11 DIAGNOSIS — F33.9 EPISODE OF RECURRENT MAJOR DEPRESSIVE DISORDER, UNSPECIFIED DEPRESSION EPISODE SEVERITY (HCC): ICD-10-CM

## 2020-12-11 DIAGNOSIS — F41.9 ANXIETY: Primary | ICD-10-CM

## 2020-12-11 PROCEDURE — 90834 PSYTX W PT 45 MINUTES: CPT | Performed by: SOCIAL WORKER

## 2020-12-22 ENCOUNTER — SOCIAL WORK (OUTPATIENT)
Dept: BEHAVIORAL/MENTAL HEALTH CLINIC | Facility: CLINIC | Age: 30
End: 2020-12-22
Payer: COMMERCIAL

## 2020-12-22 DIAGNOSIS — F33.41 RECURRENT MAJOR DEPRESSIVE DISORDER, IN PARTIAL REMISSION (HCC): ICD-10-CM

## 2020-12-22 DIAGNOSIS — F41.9 ANXIETY: Primary | ICD-10-CM

## 2020-12-22 PROCEDURE — 90834 PSYTX W PT 45 MINUTES: CPT | Performed by: SOCIAL WORKER

## 2020-12-23 ENCOUNTER — OFFICE VISIT (OUTPATIENT)
Dept: DERMATOLOGY | Facility: CLINIC | Age: 30
End: 2020-12-23
Payer: COMMERCIAL

## 2020-12-23 VITALS — TEMPERATURE: 97.3 F

## 2020-12-23 DIAGNOSIS — D22.9 NEVUS: Primary | ICD-10-CM

## 2020-12-23 DIAGNOSIS — Z13.89 SCREENING FOR SKIN CONDITION: ICD-10-CM

## 2020-12-23 PROCEDURE — 99213 OFFICE O/P EST LOW 20 MIN: CPT | Performed by: DERMATOLOGY

## 2020-12-23 PROCEDURE — 1036F TOBACCO NON-USER: CPT | Performed by: DERMATOLOGY

## 2020-12-23 RX ORDER — FLUOXETINE HYDROCHLORIDE 40 MG/1
40 CAPSULE ORAL DAILY
COMMUNITY
Start: 2020-12-05 | End: 2021-08-19

## 2021-01-08 ENCOUNTER — TELEMEDICINE (OUTPATIENT)
Dept: BEHAVIORAL/MENTAL HEALTH CLINIC | Facility: CLINIC | Age: 31
End: 2021-01-08
Payer: COMMERCIAL

## 2021-01-08 DIAGNOSIS — F33.41 RECURRENT MAJOR DEPRESSIVE DISORDER, IN PARTIAL REMISSION (HCC): ICD-10-CM

## 2021-01-08 DIAGNOSIS — F41.9 ANXIETY: Primary | ICD-10-CM

## 2021-01-08 PROCEDURE — 90834 PSYTX W PT 45 MINUTES: CPT | Performed by: SOCIAL WORKER

## 2021-01-08 NOTE — PSYCH
11:00am-11:45am    Virtual Regular Visit  Therapist met w/pt for individual session  Pt reviewed her feelings journal that she worked on over the past few weeks  She stated that it helped her acknowledge some of the common feelings she experienced and also how she has felt these feelings for most of her life  Therapist and pt discussed how emptiness was a common feeling for her and also discussed what life would look like if she didn't feel as empty  Pt was able to identify factors that lead to her feeling empty and therapist helped pt challenge her negative views by utilized several different CBT techniques  Pt shared some goals she has on how to implement more balance within her days and the progress she has made  She stated that she knows the next couple of weeks are going to be difficult for her because it is the anniversary of her father's death as well as almost her 36th birthday  Therapist and pt discussed different coping skills pt can implement throughout the week to help manage her symptoms effectively  Assessment/Plan: f/u in two weeks    Problem List Items Addressed This Visit     None               Reason for visit is No chief complaint on file  Encounter provider Carmen Goss    Provider located at [facility] [address]  [address]  [facility] [address] [place]      Recent Visits  No visits were found meeting these conditions  Showing recent visits within past 7 days and meeting all other requirements     Future Appointments  No visits were found meeting these conditions  Showing future appointments within next 150 days and meeting all other requirements        The patient was identified by name and date of birth   Brooke Kemp was informed that this is a telemedicine visit and that the visit is being conducted through [employer] and patient was informed that this is a secure, HIPAA-compliant platform  She agrees to proceed     My office door was closed  No one else was in the room  She acknowledged consent and understanding of privacy and security of the video platform  The patient has agreed to participate and understands they can discontinue the visit at any time  Patient is aware this is a billable service  Subjective  Danette Alcala is a 35 y o  female   HPI 45 minutes    Past Medical History:   Diagnosis Date    Abnormal Pap smear of cervix     Chlamydia     Rheumatoid arthritis ([facility] [address] )     Varicella        Past Surgical History:   Procedure Laterality Date    APPENDECTOMY      WISDOM TOOTH EXTRACTION         Current Outpatient Medications   Medication Sig Dispense Refill    clonazePAM (KlonoPIN) 0 5 mg tablet TK 1/2 TO 1 T PO D PRA      FLUoxetine (PROzac) 40 MG capsule Take 40 mg by mouth daily      fluticasone (FLONASE) 50 mcg/act nasal spray 1 spray into each nostril daily 1 Bottle 0    hydroxychloroquine (PLAQUENIL) 200 mg tablet Take 200 mg by mouth      Melatonin 5 MG TABS Take 5 mg by mouth      predniSONE 5 mg tablet TAKE 2-3 TABLETS BY MOUTH EVERY DAY IN THE MORNING FOR 2 TO 3 DAYS AS NEEDED      traZODone (DESYREL) 50 mg tablet       VIIBRYD 20 MG tablet        No current facility-administered medications for this visit  Allergies   Allergen Reactions    Sulfa Antibiotics Rash       Review of Systems    Video Exam    There were no vitals filed for this visit  Physical Exam Pt denied any SI/HI/AH/VH    I spent 45 minutes directly with the patient during this visit      VIRTUAL VISIT DISCLAIMER    Danette Alcala acknowledges that she has consented to an online visit or consultation   She understands that the online visit is based solely on information provided by her, and that, in the absence of a face-to-face physical evaluation by the physician, the diagnosis she receives is both limited and provisional in terms of accuracy and completeness  This is not intended to replace a full medical face-to-face evaluation by the physician  Joel Sherman understands and accepts these terms

## 2021-01-22 ENCOUNTER — SOCIAL WORK (OUTPATIENT)
Dept: BEHAVIORAL/MENTAL HEALTH CLINIC | Facility: CLINIC | Age: 31
End: 2021-01-22
Payer: COMMERCIAL

## 2021-01-22 DIAGNOSIS — F41.9 ANXIETY: Primary | ICD-10-CM

## 2021-01-22 DIAGNOSIS — F33.41 RECURRENT MAJOR DEPRESSIVE DISORDER, IN PARTIAL REMISSION (HCC): ICD-10-CM

## 2021-01-22 PROCEDURE — 90834 PSYTX W PT 45 MINUTES: CPT | Performed by: SOCIAL WORKER

## 2021-01-22 NOTE — PSYCH
11:20-12:05pm    Assessment/Plan: f/u in two weeks     There are no diagnoses linked to this encounter  Subjective: Therapist met w/pt for individual session  Pt shared that she wasn't sure what she wanted to talk about today and how this month is always difficult for her  Therapist and pt discussed what she wants to talk about vs what she needs to talk about  Pt showed therapist the progress she has made on her daily goals  Pt shared excitement over accomplishing some goals but also was hard on herself for not engaging in as much self care(yoga) as she would like  Pt also shared a journal entry that pt wrote describing her feelings  Therapist utilized some CBT interventions w/pt to help challenge her way of thinking  Therapist and pt discussed the importance of accepting her feelings and not being judgmental towards herself  She shared that she got to spend some time with her  over the long weekend and although there were moments things were okay she still struggles w/taking on his feelings  Therapist and pt discussed how pt tends to take on others emotions and discussed healthy ways to work on  herself emotionally to prevent from feeling overwhelmed  Patient ID: Rinku Lynn is a 35 y o  female  HPI 45 minutes    Review of Systems      Objective: Pt appeared to be calm and engaged  She appeared to be in a good mood  She seemed to have good insight and awareness         Physical Exam  Pt denied any SI/HI/AH/VH

## 2021-02-05 ENCOUNTER — SOCIAL WORK (OUTPATIENT)
Dept: BEHAVIORAL/MENTAL HEALTH CLINIC | Facility: CLINIC | Age: 31
End: 2021-02-05
Payer: COMMERCIAL

## 2021-02-05 DIAGNOSIS — F41.9 ANXIETY: Primary | ICD-10-CM

## 2021-02-05 DIAGNOSIS — F33.41 RECURRENT MAJOR DEPRESSIVE DISORDER, IN PARTIAL REMISSION (HCC): ICD-10-CM

## 2021-02-05 PROCEDURE — 90834 PSYTX W PT 45 MINUTES: CPT | Performed by: SOCIAL WORKER

## 2021-02-08 DIAGNOSIS — R09.82 PND (POST-NASAL DRIP): ICD-10-CM

## 2021-02-08 RX ORDER — FLUTICASONE PROPIONATE 50 MCG
1 SPRAY, SUSPENSION (ML) NASAL DAILY
Qty: 1 BOTTLE | Refills: 0 | Status: SHIPPED | OUTPATIENT
Start: 2021-02-08 | End: 2021-06-16 | Stop reason: SDUPTHER

## 2021-02-08 NOTE — PSYCH
1:00pm-1:45pm    Assessment/Plan:  F/u in two weeks   There are no diagnoses linked to this encounter  Subjective: therapist met w/pt for individual session  She shared that she had a good birthday which she was surprised about  She stated she typically struggles emotionally on her birthday but this year she was able to enjoy it with her  was able to "relax "  Pt shared that she feels she has improved with allowing herself to relax  She shared a few journal entries that she was able to write and processed it in therapy  Pt shared that she has realized that it is difficult for her to figure out a good time to write but when she does she finds it helpful  Pt shared that she has also felt more connected with her  recently which she is uneasy about  She stated she wants to be able to connect w/him but also has a lot of fear around connecting with others in general   Therapist explored this further w/pt and discussion was held around what connection means to pt an dhow she is always in conflict about connection and how fear tends to dictate pt's decisions  Pt shared that now that she has improved w/writing in her journal she wants to continue to focus on trying to do yoga more so she can work on finding a better balance for herself with hopes of being able to manage her feelings more effectively  Patient ID: Martha Garcia is a 28 y o  female  HPI 45 minutes    Review of Systems      Objective: Pt appeared engaged and appeared to be in a good mood         Physical Exam  Pt denied any SI/HI/AH/VH

## 2021-02-26 ENCOUNTER — ANNUAL EXAM (OUTPATIENT)
Dept: OBGYN CLINIC | Facility: CLINIC | Age: 31
End: 2021-02-26
Payer: COMMERCIAL

## 2021-02-26 VITALS — WEIGHT: 177.2 LBS | BODY MASS INDEX: 31.39 KG/M2 | DIASTOLIC BLOOD PRESSURE: 76 MMHG | SYSTOLIC BLOOD PRESSURE: 110 MMHG

## 2021-02-26 DIAGNOSIS — Z97.5 IUD (INTRAUTERINE DEVICE) IN PLACE: ICD-10-CM

## 2021-02-26 DIAGNOSIS — Z01.419 ENCOUNTER FOR ANNUAL ROUTINE GYNECOLOGICAL EXAMINATION: Primary | ICD-10-CM

## 2021-02-26 PROCEDURE — S0612 ANNUAL GYNECOLOGICAL EXAMINA: HCPCS | Performed by: NURSE PRACTITIONER

## 2021-02-26 RX ORDER — CETIRIZINE HYDROCHLORIDE 5 MG/1
5 TABLET ORAL DAILY
COMMUNITY

## 2021-02-26 NOTE — PATIENT INSTRUCTIONS
Intrauterine Device   WHAT YOU NEED TO KNOW:   What is an intrauterine device (IUD)? An IUD is a type of birth control that is inserted into your uterus  It is a small, flexible piece of plastic with a string on the end  It is inserted and removed by your healthcare provider  IUDs prevent sperm from reaching or fertilizing an egg  IUDs also prevent a fertilized egg from attaching to the uterus and developing into a fetus  What are the most common types of IUDs? Your healthcare provider will recommend the type of IUD that is right for you  This is based on your age and if you have had a child  If you have not had a child, a smaller IUD will be used  · A copper IUD  slowly releases a small amount of copper into your uterus  This IUD can remain in place for up to 10 years  · A hormone-releasing IUD  slowly releases a small amount of progesterone into your uterus  Progesterone is a hormone that is made by your body to help control your periods  This IUD can remain in place for 3 to 5 years  What are the advantages of an IUD? · An IUD is 98% to 99% effective in preventing pregnancy  · The IUD can be removed by your healthcare provider if you decide to have a baby  You may be able to get pregnant as soon as the IUD is removed  · An IUD protects you from pregnancy right after it is inserted  · You do not have to stop sexual activity to insert it  You do not have to remember to take your birth control pill  · Copper IUDs are safer for some women than oral birth control pills  Examples include women who smoke or have a history of blood clots  · Hormone-releasing IUDs may decrease certain health problems  Examples include bleeding and cramping that happen with your monthly period  What are the disadvantages of an IUD? · There is a small chance that you could get pregnant  Sometimes the IUD cannot be removed after you get pregnant   This increases your risk of a miscarriage or an ectopic pregnancy  Ectopic pregnancy is when the fertilized egg starts to grow somewhere other than your uterus  · An IUD does not protect you from sexually transmitted infections  · You may have cramps during the first weeks after you get the IUD  · A copper IUD may cause your monthly period to be heavier or more painful  This is more common within the first 3 months after you get the IUD  You may need to have your IUD removed if your bleeding or pain becomes severe  You may have spotting between periods  · There is a small risk of an infection within the first 20 days after the IUD is placed  Infection can lead to pelvic inflammatory disease  This can cause infertility  · Your uterus may tear when the IUD is inserted  The IUD may slip part or all of the way out of your uterus  How is the IUD inserted? · The IUD is usually inserted during your monthly period  This may help decrease the amount of discomfort you have during the procedure  It also helps make sure that you are not pregnant  You will be asked to lie down and place your feet in stirrups  Your healthcare provider will gently insert a speculum into your vagina  This is the same tool used during a Pap smear  The speculum allows your healthcare provider to see inside your vagina to your cervix  The cervix is the opening of your uterus  · Your healthcare provider will clean your cervix with an antiseptic solution to prevent infection  You may be given numbing medicine  A long plastic tube is gently passed through your cervix and into your uterus  This tube has the IUD inside of it  The IUD is pushed out of the tube and into your uterus  You may have cramps as the IUD is inserted  The tube is removed after the IUD is in place  How can I make sure my IUD is still in place? An IUD has a string made of plastic thread  One to 2 inches of this string hangs into your vagina   You cannot see this string, and it should not cause problems when you have sex  Check your IUD string every 3 days for the first 3 months after it is inserted  After that, check the string after each monthly period  Do the following to check the placement of your IUD:  · Wash your hands with soap and warm water  Dry them with a clean towel  · Bend your knees and squat low to the ground  · Gently put your index finger inside your vagina  The cervix is at the top of the vagina and feels like the tip of your nose  Feel for the IUD string  Do not pull on the string  You should not be able to feel the firm plastic of the IUD itself  · Wash your hands after you check your IUD string  Where can I find more information? · [facility] [address] , [address] [place]  Phone: 5- 450 [phone]  Web Address: https://[employer]    When should I seek immediate care? · You have severe pain or bleeding during your period  · You have a fever and severe abdominal pain  When should I call my doctor? · You think you are pregnant  · The IUD has come out  · You have bleeding from your vagina after you have sex, and it is not your period  · You have pain during sex  · You cannot feel the IUD string, the string feels longer, or you feel the plastic of the IUD itself  · You have vaginal discharge that is green, yellow, or has a foul odor  · You have questions or concerns about your condition or care  CARE AGREEMENT:   You have the right to help plan your care  Learn about your health condition and how it may be treated  Discuss treatment options with your healthcare providers to decide what care you want to receive  You always have the right to refuse treatment  The above information is an  only  It is not intended as medical advice for individual conditions or treatments  Talk to your doctor, nurse or pharmacist before following any medical regimen to see if it is safe and effective for you    © Copyright [address] Information is for [employer] use only and may not be sold, redistributed or otherwise used for commercial purposes   All illustrations and images included in CareNotes® are the copyrighted property of A D A M , Inc  or [address] [place], [place] [address]alena sherrie

## 2021-02-26 NOTE — PROGRESS NOTES
Diagnoses and all orders for this visit:    1  Encounter for annual routine gynecological examination    Healthy eating and nutrition, daily exercise discussed and SBE reinforced  Call with any issues and all questions and concerns addressed  2  IUD (intrauterine device) in place  [place] due for removal in 2023  Call for heavy bleeding or pelvic pain  Other orders  -     cetirizine (ZyrTEC) 5 MG tablet; Take 5 mg by mouth daily      All questions and concerns answered  Patient to call with any questions  Pleasant 28 y o  nulliparous, premenopausal female here for annual exam  Reports amenorrhea with IUD in place  Denies any issues with her BCM, which is: Carola IUD  Denies history of abnormal pap smears  Denies vaginal, urinary or breast issues, today  Denies pelvic pain  Sexually active without any concerns and pt declines STD testing  Denies F/C/N/V        Health Maintenance:  Last PAP:  2020, negative  Next PAP Due:  2025      Past Medical History:   Diagnosis Date    Abnormal Pap smear of cervix     Chlamydia     [facility] ([facility] [address] )     Varicella      Past Surgical History:   Procedure Laterality Date    APPENDECTOMY      WISDOM TOOTH EXTRACTION       Family History   Problem Relation Age of Onset    Mental illness Mother     Depression Mother     Rheum arthritis Mother     Esophageal cancer Father     Mental illness Sister     Rheum arthritis Sister     Mental illness Sister     Rheum arthritis Sister     Breast cancer Neg Hx     Colon cancer Neg Hx     Ovarian cancer Neg Hx     Cervical cancer Neg Hx     Uterine cancer Neg Hx      Social History     Tobacco Use    Smoking status: Never Smoker    Smokeless tobacco: Never Used   Substance Use Topics    Alcohol use: Yes     Frequency: 2-3 times a week     Drinks per session: 1 or 2     Binge frequency: Never    Drug use: Yes     Types: Marijuana       Current Outpatient Medications:     cetirizine (ZyrTEC) 5 MG tablet, Take 5 mg by mouth daily, Disp: , Rfl:     clonazePAM (KlonoPIN) 0 5 mg tablet, TK 1/2 TO 1 T PO D PRA, Disp: , Rfl:     FLUoxetine (PROzac) 40 MG capsule, Take 40 mg by mouth daily, Disp: , Rfl:     fluticasone (FLONASE) 50 mcg/act nasal spray, 1 spray into each nostril daily, Disp: 1 Bottle, Rfl: 0    hydroxychloroquine (PLAQUENIL) 200 mg tablet, Take 200 mg by mouth, Disp: , Rfl:     Melatonin 5 MG TABS, Take 5 mg by mouth, Disp: , Rfl:     predniSONE 5 mg tablet, TAKE 2-3 TABLETS BY MOUTH EVERY DAY IN THE MORNING FOR 2 TO 3 DAYS AS NEEDED, Disp: , Rfl:     traZODone (DESYREL) 50 mg tablet, , Disp: , Rfl:   Patient Active Problem List    Diagnosis Date Noted    Encounter for IUD insertion 2020    Encounter for gynecological examination without abnormal finding 2020    Encounter for removal of intrauterine contraceptive device (IUD) 2020    Encounter for initial prescription of contraceptive pills 2020    Screening for cervical cancer 2020    Anxiety 2020    Recurrent major depressive disorder, in partial remission ([facility] ) 2020    Rheumatoid arthritis of multiple sites with negative rheumatoid factor ([facility] ) 2020    BMI 30 0-30 9,adult 2020       Allergies   Allergen Reactions    Sulfa Antibiotics Rash       OB History    Para Term  AB Living   0 0 0 0 0 0   SAB TAB Ectopic Multiple Live Births   0 0 0 0 0       Vitals:    21 0903   BP: 110/76   BP Location: Left arm   Patient Position: Sitting   Cuff Size: Standard   Weight: 80 4 kg (177 lb 3 2 oz)     Body mass index is 31 39 kg/m²  Review of Systems   Constitutional: Negative for chills, fatigue, fever and unexpected weight change  Respiratory: Negative for shortness of breath  Gastrointestinal: Negative for anal bleeding, blood in stool, constipation and diarrhea  Genitourinary: Negative for difficulty urinating, dysuria and hematuria       OBGyn Exam    Physical Exam   Constitutional: She appears well-developed and well-nourished  No distress  Head: Normocephalic  Neck: Normal range of motion  Neck supple  Pulmonary: Effort normal   Breasts: bilateral without masses, skin changes or nipple discharge  Bilaterally soft and warm to touch  No areas of erythema or pain  Abdominal: Soft  Non-tender  Pelvic exam was performed with patient supine  No labial fusion  There is no rash, tenderness, lesion or injury on the right labia  There is no rash, tenderness, lesion or injury on the left labia  Uterus is not deviated, not enlarged, not fixed and not tender  Cervix exhibits no motion tenderness, no discharge and no friability  Right adnexum displays no mass, no tenderness and no fullness  Left adnexum displays no mass, no tenderness and no fullness  No erythema or tenderness in the vagina  No foreign body in the vagina  No signs of injury around the vagina  No vaginal discharge found  IUD strings visualized,   Lymphadenopathy:        Right: No inguinal adenopathy present          Left: No inguinal adenopathy present

## 2021-03-10 DIAGNOSIS — Z23 ENCOUNTER FOR IMMUNIZATION: ICD-10-CM

## 2021-03-12 ENCOUNTER — SOCIAL WORK (OUTPATIENT)
Dept: BEHAVIORAL/MENTAL HEALTH CLINIC | Facility: CLINIC | Age: 31
End: 2021-03-12
Payer: COMMERCIAL

## 2021-03-12 DIAGNOSIS — F41.9 ANXIETY: Primary | ICD-10-CM

## 2021-03-12 DIAGNOSIS — F33.41 RECURRENT MAJOR DEPRESSIVE DISORDER, IN PARTIAL REMISSION (HCC): ICD-10-CM

## 2021-03-12 PROCEDURE — 90834 PSYTX W PT 45 MINUTES: CPT | Performed by: SOCIAL WORKER

## 2021-03-12 NOTE — PSYCH
12:00pm-12:45pm    Assessment/Plan:  F/u in two weeks  Pt will ride her bike 4-5x between now and next session as a form of self care     There are no diagnoses linked to this encounter  Subjective: Therapist met w/pt for individual session  Pt shared that she has been struggling emotionally the past month  She stated she hasn't been engaging in self care and feels as if she is just disconnected all the time  Pt shared a few of her journal entries and therapist utilized CBT techniques w/pt to help her determine that although she feels disconnected there were various times where she was able to connect  Therapist pointed out that pt can easily point out her flaws but never acknowledges the good she does  Therapist and pt discussed the importance of pt setting realistic goals and prioritizing what is important to her  Therapist and pt discussed body image and self worth and how to begin to help pt feel better about herself overall  Patient ID: Teodora Douglas is a 28 y o  female  HPI 45 minutes    Review of Systems      Objective: pt appeared to calm and engaged  Pt seemed to have fair insight and awareness         Physical Exam  Pt denied any Si/HI/AH/VH

## 2021-03-26 ENCOUNTER — SOCIAL WORK (OUTPATIENT)
Dept: BEHAVIORAL/MENTAL HEALTH CLINIC | Facility: CLINIC | Age: 31
End: 2021-03-26
Payer: COMMERCIAL

## 2021-03-26 DIAGNOSIS — F33.41 RECURRENT MAJOR DEPRESSIVE DISORDER, IN PARTIAL REMISSION (HCC): ICD-10-CM

## 2021-03-26 DIAGNOSIS — F41.9 ANXIETY: Primary | ICD-10-CM

## 2021-03-26 PROCEDURE — 90834 PSYTX W PT 45 MINUTES: CPT | Performed by: SOCIAL WORKER

## 2021-03-26 NOTE — PSYCH
10:00am-10:45am    Assessment/Plan: f/u in one week     There are no diagnoses linked to this encounter  Subjective: Therapist met w/pt for individual session  Pt stated that she got her vaccine last week and as a result it made her RA flare up  She stated she was in a lot of pain and unable to complete her task of riding the bike  Therapist and pt discussed how pt will continue to work on this goal over the next week  Therapist and pt discussed other activities pt engaged in in terms of self care  Pt shared that she feels calmer but is beginning to get more anxious due to it becoming the busier time of year at her job  Therapist and pt discussed helpful strategies pt can utilize when she does begin to feel overwhelmed  Patient ID: Minor Sykes is a 28 y o  female  HPI 45 minutes    Review of Systems      Objective: pt appeared to be calm and easily engaged        Physical Exam  Pt denied any SI/HI/Ah/Vh

## 2021-04-02 ENCOUNTER — SOCIAL WORK (OUTPATIENT)
Dept: BEHAVIORAL/MENTAL HEALTH CLINIC | Facility: CLINIC | Age: 31
End: 2021-04-02
Payer: COMMERCIAL

## 2021-04-02 DIAGNOSIS — F41.9 ANXIETY: Primary | ICD-10-CM

## 2021-04-02 DIAGNOSIS — F33.41 RECURRENT MAJOR DEPRESSIVE DISORDER, IN PARTIAL REMISSION (HCC): ICD-10-CM

## 2021-04-02 PROCEDURE — 90834 PSYTX W PT 45 MINUTES: CPT | Performed by: SOCIAL WORKER

## 2021-04-05 NOTE — PSYCH
[id number]    Assessment/Plan: f/u in three weeks     There are no diagnoses linked to this encounter  Subjective: Therapist met w/pt for individual session  Pt shared that she was unable to ride her bike as the goal that was discussed last session  Pt shared that although she didn't do that she feels overall a little more stable emotionally  She stated that instead of the bike ride she decided to spend the whole day cleaning and afterwards she felt a lot better   She stated that cleaning can either get her more in her head or can be therapeutic for her  She stated that she is anxious about this weekend because she is planning on building a chicken coop with her  and her loved ones  Therapist and pt discussed what tends to be triggers for her anxiety during these encounters and how she can prepare emotionally for them  Therapist and pt discussed coping skills pt can implement in the moment as well that can help manage her symptoms  Patient ID: Lawanda Alba is a 28 y o  female  HPI 45 minutes    Review of Systems      Objective: pt appeared calm and was easily engaged  She seemed to have good insight and awareness         Physical Exam  Pt denied any SI/HI/Ah/VH

## 2021-04-23 ENCOUNTER — SOCIAL WORK (OUTPATIENT)
Dept: BEHAVIORAL/MENTAL HEALTH CLINIC | Facility: CLINIC | Age: 31
End: 2021-04-23
Payer: COMMERCIAL

## 2021-04-23 DIAGNOSIS — F41.9 ANXIETY: ICD-10-CM

## 2021-04-23 DIAGNOSIS — F33.41 RECURRENT MAJOR DEPRESSIVE DISORDER, IN PARTIAL REMISSION (HCC): Primary | ICD-10-CM

## 2021-04-23 PROCEDURE — 90834 PSYTX W PT 45 MINUTES: CPT | Performed by: SOCIAL WORKER

## 2021-04-23 NOTE — PSYCH
10:00am-10:45am    Assessment/Plan: f/u in two weeks     There are no diagnoses linked to this encounter  Subjective: Therapist met w/pt for individual session  Pt shared that she took this past week off from work due to getting the vaccine earlier in the week and being exhausted  She shared that she hasn't been in her "head" too much this week due to sleeping a lot  She shared that in between sessions she has been thinking a lot about a few topics including her marriage and her work  She shared that she isn't finding enjoyment from work and finds it very stressful so she is questioning what she should do  Therapist and pt discussed how pt tends to get stressed wherever she works so the focus could be different ways pt can begin to manage her stress mor effectively  Therapist and pt discussed how pt doesn't tend to view herself well so as a result maybe she is trying to ruin a good thing in her life such her job and her   Therapist and pt continued to discuss self sabotage and the importance of pt not acting impulsively but continuing to take time to reflect and challenge irrational thoughts that arise  Patient ID: Minor Hansen is a 28 y o  female  HPI 45 minutes    Review of Systems      Objective: Pt appeared to be open and honest   She appeared to be in a good mood  She appeared to have good insight and judgment         Physical Exam  Pt denied any SI/HI/AH/Vh

## 2021-05-07 ENCOUNTER — SOCIAL WORK (OUTPATIENT)
Dept: BEHAVIORAL/MENTAL HEALTH CLINIC | Facility: CLINIC | Age: 31
End: 2021-05-07
Payer: COMMERCIAL

## 2021-05-07 DIAGNOSIS — F41.9 ANXIETY: Primary | ICD-10-CM

## 2021-05-07 DIAGNOSIS — F33.41 RECURRENT MAJOR DEPRESSIVE DISORDER, IN PARTIAL REMISSION (HCC): ICD-10-CM

## 2021-05-07 PROCEDURE — 90834 PSYTX W PT 45 MINUTES: CPT | Performed by: SOCIAL WORKER

## 2021-06-04 ENCOUNTER — SOCIAL WORK (OUTPATIENT)
Dept: BEHAVIORAL/MENTAL HEALTH CLINIC | Facility: CLINIC | Age: 31
End: 2021-06-04
Payer: COMMERCIAL

## 2021-06-04 DIAGNOSIS — F41.9 ANXIETY: Primary | ICD-10-CM

## 2021-06-04 DIAGNOSIS — F33.41 RECURRENT MAJOR DEPRESSIVE DISORDER, IN PARTIAL REMISSION (HCC): ICD-10-CM

## 2021-06-04 PROCEDURE — 90834 PSYTX W PT 45 MINUTES: CPT | Performed by: SOCIAL WORKER

## 2021-06-07 NOTE — PSYCH
10:00am-10:45am    Assessment/Plan: f/u in two weeks     There are no diagnoses linked to this encounter  Subjective: Therapist met w/pt for individual session  Pt stated that she spoke to her significant other about a session and that he is interested but now pt is realizing that she doesn't feel as if she is ready  She stated that there are some things that she doesn't feel ready to talk about and realizes she needs to take some more time to focus on herself  Pt stated that she realizes she tends to push people away and wants to make sure that she isn't doing the same thing just to push him away  Pt stated that she feels as if she is in constant conflict internally  Therapist and pt discussed steps pt can take to begin to work on finding more peace within rather than bottling things up from her past   Pt stated the symptoms she is experiencing right now are: irritability, mood swings and impulsiveness  Patient ID: Elias Rhodes is a 28 y o  female  HPI 45 minutes    Review of Systems      Objective: pt appeared to be calm and easily engaged  She seemed to have good insight and awareness         Physical Exam  pt denied any SI/HI/AH/Vh

## 2021-06-13 DIAGNOSIS — R09.82 PND (POST-NASAL DRIP): ICD-10-CM

## 2021-06-13 RX ORDER — FLUTICASONE PROPIONATE 50 MCG
1 SPRAY, SUSPENSION (ML) NASAL DAILY
Refills: 0 | Status: CANCELLED | OUTPATIENT
Start: 2021-06-13

## 2021-06-18 ENCOUNTER — SOCIAL WORK (OUTPATIENT)
Dept: BEHAVIORAL/MENTAL HEALTH CLINIC | Facility: CLINIC | Age: 31
End: 2021-06-18
Payer: COMMERCIAL

## 2021-06-18 DIAGNOSIS — F41.9 ANXIETY: Primary | ICD-10-CM

## 2021-06-18 DIAGNOSIS — F33.41 RECURRENT MAJOR DEPRESSIVE DISORDER, IN PARTIAL REMISSION (HCC): ICD-10-CM

## 2021-06-18 PROCEDURE — 90834 PSYTX W PT 45 MINUTES: CPT | Performed by: SOCIAL WORKER

## 2021-06-18 NOTE — PSYCH
10:00am-10:45am    Assessment/Plan: f/u in three weeks     There are no diagnoses linked to this encounter  Subjective: Therapist met w/pt for individual session  Pt shared that she feels things are "the same "  Pt stated she ended up seeing her family at the flower show and everything was "fine "  Therapist and pt discussed how pt has accepted her family relationships for what they are  Pt stated that her sister's wedding is tomorrow which she has mixed feelings about  Pt continued to open up about the mixed feelings  She stated that her  is going away soon for three weeks and that she is looking forward to the time to herself so she can figure out what is going on and if this relationship is what she wants  Therapist and pt discussed couples session scheduled for the end of July  Pt stated that he is motivated to attend so she needs to figure out what she wants  Overall, pt stated she is content and tired due to it being the busy time at work  Patient ID: Elia Mills is a 28 y o  female  HPI 45 minutes    Review of Systems      Objective: Pt appeared to be in a good mood         Physical Exam  Pt denied any SI/Hi/Ah/VH

## 2021-07-16 ENCOUNTER — SOCIAL WORK (OUTPATIENT)
Dept: BEHAVIORAL/MENTAL HEALTH CLINIC | Facility: CLINIC | Age: 31
End: 2021-07-16
Payer: COMMERCIAL

## 2021-07-16 DIAGNOSIS — F33.1 MODERATE EPISODE OF RECURRENT MAJOR DEPRESSIVE DISORDER (HCC): Primary | ICD-10-CM

## 2021-07-16 DIAGNOSIS — F41.9 ANXIETY: ICD-10-CM

## 2021-07-16 PROCEDURE — 90834 PSYTX W PT 45 MINUTES: CPT | Performed by: SOCIAL WORKER

## 2021-07-16 NOTE — PSYCH
10:00am-10:45am    Assessment/Plan: f/u in a week     There are no diagnoses linked to this encounter  Subjective: Therapist met w/pt for individual session  Pt expressed an increase in symptoms including: hopelessness, disconnected, depressed, less energy, and less motivation  Pt expressed feelings of abandonment due to feeling like she is putting more into her friendship than her friend is  Pt stated she has been feeling like this since her friend moved and knows that this is a pattern for her  Pt stated that her  has begun to implement changes but pt doesn't feel like working on that relationship  Therapist and pt discussed meeting next week to discuss if having the session with her  in two weeks is in pt's best interest given her emotional state  Therapist assessed for SI but pt denied any SI  She stated she was planning on going to work with her  after session today  Therapist and pt discussed steps pt can take between now and next week to help her work through these uncomfortable feelings  Patient ID: Sage Child is a 28 y o  female  HPI 45 minutes    Review of Systems      Objective: Pt appeared to be depressed and guarded  Pts speech was soft and slow         Physical Exam  Pt denied any SI/HI/AH/VH

## 2021-07-30 ENCOUNTER — SOCIAL WORK (OUTPATIENT)
Dept: BEHAVIORAL/MENTAL HEALTH CLINIC | Facility: CLINIC | Age: 31
End: 2021-07-30
Payer: COMMERCIAL

## 2021-07-30 DIAGNOSIS — F33.41 RECURRENT MAJOR DEPRESSIVE DISORDER, IN PARTIAL REMISSION (HCC): ICD-10-CM

## 2021-07-30 DIAGNOSIS — F41.9 ANXIETY: Primary | ICD-10-CM

## 2021-07-30 PROCEDURE — 90834 PSYTX W PT 45 MINUTES: CPT | Performed by: SOCIAL WORKER

## 2021-08-02 NOTE — PSYCH
2:00pm-2:45pm    Assessment/Plan: f/u in two weeks     There are no diagnoses linked to this encounter  Subjective: Therapist met w/pt and pt's significant other for session  Discussion was held around how they tend to "feed" off of one anothers mood and how to begin to be more independent emotionally rather than codependent  Therapist pointed out the importance of communicating and how to learn how to regulate there emotions  Pt stated that she is struggling a lot right now and was able to identify what she needs from her significant  Pt expressed not having much energy but pushing herself to workout and encouraged her significant other to put in effort as well  Further discussion was held around ways they can begin to rekindle there relationship  Patient ID: Ross San is a 28 y o  female  HPI 45 minutes    Review of Systems      Objective: pt appeared to be guarded yet more engaged as session continued         Physical Exam  Pt denied any SI/HI/Ah/VH

## 2021-08-19 ENCOUNTER — OFFICE VISIT (OUTPATIENT)
Dept: FAMILY MEDICINE CLINIC | Facility: CLINIC | Age: 31
End: 2021-08-19
Payer: COMMERCIAL

## 2021-08-19 VITALS
BODY MASS INDEX: 31.57 KG/M2 | TEMPERATURE: 97.3 F | DIASTOLIC BLOOD PRESSURE: 80 MMHG | RESPIRATION RATE: 14 BRPM | WEIGHT: 178.2 LBS | HEART RATE: 78 BPM | HEIGHT: 63 IN | SYSTOLIC BLOOD PRESSURE: 100 MMHG | OXYGEN SATURATION: 97 %

## 2021-08-19 DIAGNOSIS — Z00.00 ANNUAL PHYSICAL EXAM: Primary | ICD-10-CM

## 2021-08-19 DIAGNOSIS — T78.40XA SEVERE ALLERGIC REACTION, INITIAL ENCOUNTER: ICD-10-CM

## 2021-08-19 DIAGNOSIS — Z00.00 HEALTHCARE MAINTENANCE: ICD-10-CM

## 2021-08-19 DIAGNOSIS — M06.09 RHEUMATOID ARTHRITIS OF MULTIPLE SITES WITH NEGATIVE RHEUMATOID FACTOR (HCC): ICD-10-CM

## 2021-08-19 PROCEDURE — 3008F BODY MASS INDEX DOCD: CPT | Performed by: NURSE PRACTITIONER

## 2021-08-19 PROCEDURE — 3725F SCREEN DEPRESSION PERFORMED: CPT | Performed by: NURSE PRACTITIONER

## 2021-08-19 PROCEDURE — 99395 PREV VISIT EST AGE 18-39: CPT | Performed by: NURSE PRACTITIONER

## 2021-08-19 PROCEDURE — 1036F TOBACCO NON-USER: CPT | Performed by: NURSE PRACTITIONER

## 2021-08-19 RX ORDER — EPINEPHRINE 0.3 MG/.3ML
0.3 INJECTION SUBCUTANEOUS ONCE
Qty: 0.6 ML | Refills: 0 | Status: SHIPPED | OUTPATIENT
Start: 2021-08-19 | End: 2021-08-19

## 2021-08-19 RX ORDER — ARIPIPRAZOLE 5 MG/1
2.5 TABLET ORAL
COMMUNITY
Start: 2021-08-13 | End: 2022-03-01 | Stop reason: ALTCHOICE

## 2021-08-19 RX ORDER — ESCITALOPRAM OXALATE 10 MG/1
TABLET ORAL
COMMUNITY
Start: 2021-08-13 | End: 2022-03-01 | Stop reason: ALTCHOICE

## 2021-08-19 RX ORDER — ESCITALOPRAM OXALATE 5 MG/1
TABLET ORAL
COMMUNITY
Start: 2021-08-13 | End: 2022-03-01 | Stop reason: ALTCHOICE

## 2021-08-19 NOTE — PATIENT INSTRUCTIONS
Obtain fasting labs, nothing to eat after midnight, may drink water  Wellness Visit for Adults   AMBULATORY CARE:   A wellness visit  is when you see your healthcare provider to get screened for health problems  Your healthcare provider will also give you advice on how to stay healthy  Write down your questions so you remember to ask them  Ask your healthcare provider how often you should have a wellness visit  What happens at a wellness visit:  Your healthcare provider will ask about your health, and your family history of health problems  This includes high blood pressure, heart disease, and cancer  He or she will ask if you have symptoms that concern you, if you smoke, and about your mood  You may also be asked about your intake of medicines, supplements, food, and alcohol  Any of the following may be done:  · Your weight  will be checked  Your height may also be checked so your body mass index (BMI) can be calculated  Your BMI shows if you are at a healthy weight  · Your blood pressure  and heart rate will be checked  Your temperature may also be checked  · Blood and urine tests  may be done  Blood tests may be done to check your cholesterol levels  Abnormal cholesterol levels increase your risk for heart disease and stroke  You may also need a blood or urine test to check for diabetes if you are at increased risk  Urine tests may be done to look for signs of an infection or kidney disease  · A physical exam  includes checking your heartbeat and lungs with a stethoscope  Your healthcare provider may also check your skin to look for sun damage  · Screening tests  may be recommended  A screening test is done to check for diseases that may not cause symptoms  The screening tests you may need depend on your age, gender, family history, and lifestyle habits  For example, colorectal screening may be recommended if you are 48years old or older      Screening tests you need if you are a woman:   · A Pap smear  is used to screen for cervical cancer  Pap smears are usually done every 3 to 5 years depending on your age  You may need them more often if you have had abnormal Pap smear test results in the past  Ask your healthcare provider how often you should have a Pap smear  · A mammogram  is an x-ray of your breasts to screen for breast cancer  Experts recommend mammograms every 2 years starting at age 48 years  You may need a mammogram at age 52 years or younger if you have an increased risk for breast cancer  Talk to your healthcare provider about when you should start having mammograms and how often you need them  Vaccines you may need:   · Get an influenza vaccine  every year  The influenza vaccine protects you from the flu  Several types of viruses cause the flu  The viruses change over time, so new vaccines are made each year  · Get a tetanus-diphtheria (Td) booster vaccine  every 10 years  This vaccine protects you against tetanus and diphtheria  Tetanus is a severe infection that may cause painful muscle spasms and lockjaw  Diphtheria is a severe bacterial infection that causes a thick covering in the back of your mouth and throat  · Get a human papillomavirus (HPV) vaccine  if you are female and aged 23 to 32 or male 23 to 24 and never received it  This vaccine protects you from HPV infection  HPV is the most common infection spread by sexual contact  HPV may also cause vaginal, penile, and anal cancers  · Get a pneumococcal vaccine  if you are aged 72 years or older  The pneumococcal vaccine is an injection given to protect you from pneumococcal disease  Pneumococcal disease is an infection caused by pneumococcal bacteria  The infection may cause pneumonia, meningitis, or an ear infection  · Get a shingles vaccine  if you are 60 or older, even if you have had shingles before  The shingles vaccine is an injection to protect you from the varicella-zoster virus   This is the same virus that causes chickenpox  Shingles is a painful rash that develops in people who had chickenpox or have been exposed to the virus  How to eat healthy:  My Plate is a model for planning healthy meals  It shows the types and amounts of foods that should go on your plate  Fruits and vegetables make up about half of your plate, and grains and protein make up the other half  A serving of dairy is included on the side of your plate  The amount of calories and serving sizes you need depends on your age, gender, weight, and height  Examples of healthy foods are listed below:  · Eat a variety of vegetables  such as dark green, red, and orange vegetables  You can also include canned vegetables low in sodium (salt) and frozen vegetables without added butter or sauces  · Eat a variety of fresh fruits , canned fruit in 100% juice, frozen fruit, and dried fruit  · Include whole grains  At least half of the grains you eat should be whole grains  Examples include whole-wheat bread, wheat pasta, brown rice, and whole-grain cereals such as oatmeal     · Eat a variety of protein foods such as seafood (fish and shellfish), lean meat, and poultry without skin (turkey and chicken)  Examples of lean meats include pork leg, shoulder, or tenderloin, and beef round, sirloin, tenderloin, and extra lean ground beef  Other protein foods include eggs and egg substitutes, beans, peas, soy products, nuts, and seeds  · Choose low-fat dairy products such as skim or 1% milk or low-fat yogurt, cheese, and cottage cheese  · Limit unhealthy fats  such as butter, hard margarine, and shortening  Exercise:  Exercise at least 30 minutes per day on most days of the week  Some examples of exercise include walking, biking, dancing, and swimming  You can also fit in more physical activity by taking the stairs instead of the elevator or parking farther away from stores  Include muscle strengthening activities 2 days each week   Regular exercise provides many health benefits  It helps you manage your weight, and decreases your risk for type 2 diabetes, heart disease, stroke, and high blood pressure  Exercise can also help improve your mood  Ask your healthcare provider about the best exercise plan for you  General health and safety guidelines:   · Do not smoke  Nicotine and other chemicals in cigarettes and cigars can cause lung damage  Ask your healthcare provider for information if you currently smoke and need help to quit  E-cigarettes or smokeless tobacco still contain nicotine  Talk to your healthcare provider before you use these products  · Limit alcohol  A drink of alcohol is 12 ounces of beer, 5 ounces of wine, or 1½ ounces of liquor  · Lose weight, if needed  Being overweight increases your risk of certain health conditions  These include heart disease, high blood pressure, type 2 diabetes, and certain types of cancer  · Protect your skin  Do not sunbathe or use tanning beds  Use sunscreen with a SPF 15 or higher  Apply sunscreen at least 15 minutes before you go outside  Reapply sunscreen every 2 hours  Wear protective clothing, hats, and sunglasses when you are outside  · Drive safely  Always wear your seatbelt  Make sure everyone in your car wears a seatbelt  A seatbelt can save your life if you are in an accident  Do not use your cell phone when you are driving  This could distract you and cause an accident  Pull over if you need to make a call or send a text message  · Practice safe sex  Use latex condoms if are sexually active and have more than one partner  Your healthcare provider may recommend screening tests for sexually transmitted infections (STIs)  · Wear helmets, lifejackets, and protective gear  Always wear a helmet when you ride a bike or motorcycle, go skiing, or play sports that could cause a head injury  Wear protective equipment when you play sports   Wear a lifejacket when you are on a boat or doing water sports  © Copyright [employer] 2021 Information is for End User's use only and may not be sold, redistributed or otherwise used for commercial purposes  All illustrations and images included in CareNotes® are the copyrighted property of [employer]  or Ronny Lewis  The above information is an  only  It is not intended as medical advice for individual conditions or treatments  Talk to your doctor, nurse or pharmacist before following any medical regimen to see if it is safe and effective for you

## 2021-08-19 NOTE — ASSESSMENT & PLAN NOTE
Assessment completed  Patient is up-to-date with cervical cancer screening  Blood work ordered  Patient also wanted to make an appointment for 3rd COVID vaccine, has rheumatoid arthritis  Generally doing well  Works as  An , is out on [employer],  Working with [employer]    Patient is , has no children reports no interest   Is following up with Psychiatry for anxiety and depression

## 2021-08-19 NOTE — PROGRESS NOTES
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[employer],  Working with [employer]  Patient is , has no children reports no interest   Is following up with Psychiatry for anxiety and depression           Other Visit Diagnoses     Healthcare maintenance        Relevant Orders    CBC and differential    Comprehensive metabolic panel    Lipid panel    TSH, 3rd generation with Free T4 reflex    Severe allergic reaction, initial encounter        Relevant Medications    EPINEPHrine (EPIPEN) 0 3 mg/0 3 mL SOAJ          Immunizations and preventive care screenings were discussed with patient today  Appropriate education was printed on patient's after visit summary  Counseling:  Alcohol/drug use: discussed moderation in alcohol intake, the recommendations for healthy alcohol use, and avoidance of illicit drug use  Dental Health: discussed importance of regular tooth brushing, flossing, and dental visits  Injury prevention: discussed safety/seat belts, safety helmets, smoke detectors, carbon dioxide detectors, and smoking near bedding or upholstery  Sexual health: discussed sexually transmitted diseases, partner selection, use of condoms, avoidance of unintended pregnancy, and contraceptive alternatives    · Exercise: the importance of regular exercise/physical activity was discussed  Recommend exercise 3-5 times per week for at least 30 minutes  BMI Counseling: Body mass index is 31 57 kg/m²  The BMI is above normal  Nutrition recommendations include decreasing portion sizes, encouraging healthy choices of fruits and vegetables, decreasing fast food intake, consuming healthier snacks, limiting drinks that contain sugar, moderation in carbohydrate intake, increasing intake of lean protein, reducing intake of saturated and trans fat and reducing intake of cholesterol  Exercise recommendations include exercising 3-5 times per week and strength training exercises  Pharmacotherapy was ordered to help aid in weight loss  No follow-ups on file  Chief Complaint:     Chief Complaint   Patient presents with    Physical Exam     Would like booster covid, would like epi pen       History of Present Illness:     Adult Annual Physical   Patient here for a comprehensive physical exam  The patient reports no problems  Diet and Physical Activity  · Diet/Nutrition: well balanced diet  · Exercise: 3-4 times a week on average  Depression Screening  PHQ-9 Depression Screening    PHQ-9:   Frequency of the following problems over the past two weeks:      Little interest or pleasure in doing things: 2 - more than half the days  Feeling down, depressed, or hopeless: 2 - more than half the days  Trouble falling or staying asleep, or sleeping too much: 3 - nearly every day  Feeling tired or having little energy: 3 - nearly every day  Poor appetite or overeating: 3 - nearly every day  Feeling bad about yourself - or that you are a failure or have let yourself or your family down: 3 - nearly every day  Trouble concentrating on things, such as reading the newspaper or watching television: 3 - nearly every day  Moving or speaking so slowly that other people could have noticed   Or the opposite - being so fidgety or restless that you have been moving around a lot more than usual: 3 - nearly every day  Thoughts that you would be better off dead, or of hurting yourself in some way: 1 - several days  PHQ-2 Score: 4  PHQ-9 Score: 23       General Health  · Sleep: gets 7-8 hours of sleep on average  · Hearing: Hearing a normal - bilateral   · Vision: most recent eye exam >1 year ago and wears glasses  · Dental: no dental visits for >1 year and brushes teeth twice daily  /GYN Health  · Last menstrual period:   · Contraceptive method: IUD placement  · History of STDs?: no      Review of Systems:     Review of Systems   Constitutional: Negative  HENT: Negative  Eyes: Negative  Respiratory: Positive for cough  Cardiovascular: Negative  Gastrointestinal: Negative  Endocrine: Negative  Genitourinary: Negative  Musculoskeletal: Negative  Skin: Negative  Allergic/Immunologic: Negative  Neurological: Negative  Psychiatric/Behavioral: Negative  Past Medical History:     Past Medical History:   Diagnosis Date    Abnormal Pap smear of cervix     Chlamydia     Rheumatoid arthritis ([facility] [address] )     Varicella       Past Surgical History:     Past Surgical History:   Procedure Laterality Date    APPENDECTOMY      WISDOM TOOTH EXTRACTION        Social History:     Social History     Socioeconomic History    Marital status: /Civil Union     Spouse name: None    Number of children: None    Years of education: None    Highest education level: None   Occupational History    None   Tobacco Use    Smoking status: Never Smoker    Smokeless tobacco: Never Used   Substance and Sexual Activity    Alcohol use:  Yes    Drug use: Yes     Types: Marijuana    Sexual activity: Yes     Partners: Male   Other Topics Concern    None   Social History Narrative    None     Social Determinants of Health     Financial Resource Strain:     Difficulty of Paying Living Expenses:    Food Insecurity:     Worried About Running Out of Food in the Last Year:     Tayla maza Food in the Last Year:    Transportation Needs:     Lack of Transportation (Medical):      Lack of Transportation (Non-Medical):    Physical Activity:     Days of Exercise per Week:     Minutes of Exercise per Session:    Stress:     Feeling of Stress :    Social Connections:     Frequency of Communication with Friends and Family:     Frequency of Social Gatherings with Friends and Family:     Attends Druze Services:     Active Member of Clubs or Organizations:     Attends Club or Organization Meetings:     Marital Status:    Intimate Partner Violence:     Fear of Current or Ex-Partner:     Emotionally Abused:     Physically Abused:     Sexually Abused:       Family History:     Family History   Problem Relation Age of Onset    Mental illness Mother     Depression Mother     Rheum arthritis Mother     Esophageal cancer Father     Mental illness Sister     Rheum arthritis Sister     Mental illness Sister     Rheum arthritis Sister     Breast cancer Neg Hx     Colon cancer Neg Hx     Ovarian cancer Neg Hx     Cervical cancer Neg Hx     Uterine cancer Neg Hx       Current Medications:     Current Outpatient Medications   Medication Sig Dispense Refill    ARIPiprazole (ABILIFY) 5 mg tablet Take 2 5 mg by mouth daily at bedtime      cetirizine (ZyrTEC) 5 MG tablet Take 5 mg by mouth daily      clonazePAM (KlonoPIN) 0 5 mg tablet TK 1/2 TO 1 T PO D PRA      escitalopram (LEXAPRO) 10 mg tablet       escitalopram (LEXAPRO) 5 mg tablet       fluticasone (FLONASE) 50 mcg/act nasal spray 1 spray into each nostril daily 18 2 mL 3    hydroxychloroquine (PLAQUENIL) 200 mg tablet Take 200 mg by mouth      Melatonin 5 MG TABS Take 5 mg by mouth Take 2 tablets by mouth at night as needed      predniSONE 5 mg tablet TAKE 2-3 TABLETS BY MOUTH EVERY DAY IN THE MORNING FOR 2 TO 3 DAYS AS NEEDED      traZODone (DESYREL) 50 mg tablet       EPINEPHrine (EPIPEN) 0 3 mg/0 3 mL SOAJ Inject 0 3 mL (0 3 mg total) into a muscle once for 1 dose 0 6 mL 0     No current facility-administered medications for this visit  Allergies: Allergies   Allergen Reactions    Sulfa Antibiotics Rash      Physical Exam:     /80   Pulse 78   Temp (!) 97 3 °F (36 3 °C)   Resp 14   Ht 5' 3" (1 6 m)   Wt 80 8 kg (178 lb 3 2 oz)   SpO2 97%   BMI 31 57 kg/m²     Physical Exam  Constitutional:       General: She is not in acute distress  Appearance: Normal appearance  She is obese  She is not ill-appearing  HENT:      Head: Normocephalic and atraumatic  Nose: Nose normal       Mouth/Throat:      Mouth: Mucous membranes are moist    Eyes:      Pupils: Pupils are equal, round, and reactive to light  Cardiovascular:      Rate and Rhythm: Normal rate and regular rhythm  Pulses: Normal pulses  Pulmonary:      Effort: Pulmonary effort is normal  No respiratory distress  Breath sounds: Normal breath sounds  Chest:      Chest wall: No tenderness  Abdominal:      General: Abdomen is flat  Bowel sounds are normal  There is no distension  Palpations: There is no mass  Tenderness: There is no abdominal tenderness  Musculoskeletal:         General: Normal range of motion  Cervical back: Normal range of motion and neck supple  Skin:     General: Skin is warm and dry  Neurological:      General: No focal deficit present  Mental Status: She is alert and oriented to person, place, and time  Psychiatric:         Mood and Affect: Mood normal          Behavior: Behavior normal          Thought Content:  Thought content normal          Judgment: Judgment normal           Roseline Aragon, 1958 [facility] [address] [facility]

## 2021-08-20 ENCOUNTER — IMMUNIZATIONS (OUTPATIENT)
Dept: FAMILY MEDICINE CLINIC | Facility: HOSPITAL | Age: 31
End: 2021-08-20

## 2021-08-20 DIAGNOSIS — Z23 ENCOUNTER FOR IMMUNIZATION: Primary | ICD-10-CM

## 2021-08-20 PROCEDURE — 91300 SARS-COV-2 / COVID-19 MRNA VACCINE (PFIZER-BIONTECH) 30 MCG: CPT

## 2021-08-20 PROCEDURE — 0001A SARS-COV-2 / COVID-19 MRNA VACCINE (PFIZER-BIONTECH) 30 MCG: CPT

## 2021-08-27 ENCOUNTER — SOCIAL WORK (OUTPATIENT)
Dept: BEHAVIORAL/MENTAL HEALTH CLINIC | Facility: CLINIC | Age: 31
End: 2021-08-27
Payer: COMMERCIAL

## 2021-08-27 DIAGNOSIS — F33.41 RECURRENT MAJOR DEPRESSIVE DISORDER, IN PARTIAL REMISSION (HCC): Primary | ICD-10-CM

## 2021-08-27 DIAGNOSIS — F41.9 ANXIETY: ICD-10-CM

## 2021-08-27 PROCEDURE — 90834 PSYTX W PT 45 MINUTES: CPT | Performed by: SOCIAL WORKER

## 2021-08-27 NOTE — PSYCH
10:00am-10:45am    Assessment/Plan: f/u in two weeks     There are no diagnoses linked to this encounter  Subjective: Therapist met w/pt for individual session  She stated she met w/her psychiatrist in between sessions and that another medication was added  She stated she has noticed feeling less irritable  However, she continues to struggle in her romantic relationship  Pt shared when her  was away on work, she had a great time and felt so much emotional relief  Pt stated she is hoping he begins therapy but has yet to call to make an appointment  Therapist and pt discussed goals pt can continue to focus on for herself so she can continue to improve and grow  Pt reports still feeling as if she has less energy and motivation but continues to push through it and force herself to do things such as go to the gym with hopes of feeling some relief  Patient ID: Robert Mehta is a 28 y o  female  HPI 45 minutes    Review of Systems      Objective: pt appeared to be depressed yet easily engaged         Physical Exam  Pt denied any Si/HI/Ah/VH

## 2021-09-10 ENCOUNTER — SOCIAL WORK (OUTPATIENT)
Dept: BEHAVIORAL/MENTAL HEALTH CLINIC | Facility: CLINIC | Age: 31
End: 2021-09-10
Payer: COMMERCIAL

## 2021-09-10 DIAGNOSIS — F33.41 RECURRENT MAJOR DEPRESSIVE DISORDER, IN PARTIAL REMISSION (HCC): ICD-10-CM

## 2021-09-10 DIAGNOSIS — F41.9 ANXIETY: Primary | ICD-10-CM

## 2021-09-10 PROCEDURE — 90834 PSYTX W PT 45 MINUTES: CPT | Performed by: SOCIAL WORKER

## 2021-09-10 NOTE — PSYCH
9: 30am-10:15am    Assessment/Plan: f/u in three weeks     There are no diagnoses linked to this encounter  Subjective: Therapist met w/pt for individual session  Pt stated that her  went to his first therapy appointment and is grateful that he has taken the next step  She stated she isn't having high expectations but is hoping that this will change things  Pt stated that as much as she thinks about leaving she also realizes that connecting with anyone is difficult for her  Therapist and pt discussed how pt tends to remain disconnected and "numb" because it is easier, however, pt was able to identify that she knows that that prevents her from being happy  Therapist explored some of pt's previously experiences and what led up to pt's current emotional state  Therapist and pt discussed small steps pt is taking to work on improving her overall image of herself  Patient ID: Vinny Zamorano is a 28 y o  female  HPI 45 minutes    Review of Systems      Objective: Pt appeared to be disconnected yet responsive when prompted         Physical Exam  Pt denied any SI/HI/AH/Vh

## 2021-10-01 ENCOUNTER — SOCIAL WORK (OUTPATIENT)
Dept: BEHAVIORAL/MENTAL HEALTH CLINIC | Facility: CLINIC | Age: 31
End: 2021-10-01
Payer: COMMERCIAL

## 2021-10-01 DIAGNOSIS — F33.1 MODERATE EPISODE OF RECURRENT MAJOR DEPRESSIVE DISORDER (HCC): ICD-10-CM

## 2021-10-01 DIAGNOSIS — F41.9 ANXIETY: Primary | ICD-10-CM

## 2021-10-01 PROCEDURE — 90834 PSYTX W PT 45 MINUTES: CPT | Performed by: SOCIAL WORKER

## 2021-10-15 ENCOUNTER — SOCIAL WORK (OUTPATIENT)
Dept: BEHAVIORAL/MENTAL HEALTH CLINIC | Facility: CLINIC | Age: 31
End: 2021-10-15
Payer: COMMERCIAL

## 2021-10-15 DIAGNOSIS — F33.1 MODERATE EPISODE OF RECURRENT MAJOR DEPRESSIVE DISORDER (HCC): Primary | ICD-10-CM

## 2021-10-15 DIAGNOSIS — F41.9 ANXIETY: ICD-10-CM

## 2021-10-15 PROCEDURE — 90834 PSYTX W PT 45 MINUTES: CPT | Performed by: SOCIAL WORKER

## 2021-11-03 DIAGNOSIS — R09.82 PND (POST-NASAL DRIP): ICD-10-CM

## 2021-11-03 RX ORDER — FLUTICASONE PROPIONATE 50 MCG
1 SPRAY, SUSPENSION (ML) NASAL DAILY
Qty: 18.2 ML | Refills: 0 | Status: SHIPPED | OUTPATIENT
Start: 2021-11-03 | End: 2022-01-21 | Stop reason: SDUPTHER

## 2021-11-04 ENCOUNTER — TELEPHONE (OUTPATIENT)
Dept: FAMILY MEDICINE CLINIC | Facility: CLINIC | Age: 31
End: 2021-11-04

## 2021-11-10 ENCOUNTER — TELEPHONE (OUTPATIENT)
Dept: GENETICS | Facility: CLINIC | Age: 31
End: 2021-11-10

## 2021-11-12 ENCOUNTER — IMMUNIZATIONS (OUTPATIENT)
Dept: FAMILY MEDICINE CLINIC | Facility: CLINIC | Age: 31
End: 2021-11-12
Payer: COMMERCIAL

## 2021-11-12 ENCOUNTER — TELEPHONE (OUTPATIENT)
Dept: GENETICS | Facility: CLINIC | Age: 31
End: 2021-11-12

## 2021-11-12 DIAGNOSIS — Z23 NEED FOR IMMUNIZATION AGAINST INFLUENZA: Primary | ICD-10-CM

## 2021-11-12 DIAGNOSIS — Z23 NEED FOR TDAP VACCINATION: ICD-10-CM

## 2021-11-12 PROCEDURE — 90472 IMMUNIZATION ADMIN EACH ADD: CPT

## 2021-11-12 PROCEDURE — 90715 TDAP VACCINE 7 YRS/> IM: CPT

## 2021-11-12 PROCEDURE — 90682 RIV4 VACC RECOMBINANT DNA IM: CPT

## 2021-11-12 PROCEDURE — 90471 IMMUNIZATION ADMIN: CPT

## 2021-12-10 ENCOUNTER — SOCIAL WORK (OUTPATIENT)
Dept: BEHAVIORAL/MENTAL HEALTH CLINIC | Facility: CLINIC | Age: 31
End: 2021-12-10
Payer: COMMERCIAL

## 2021-12-10 DIAGNOSIS — F32.A DEPRESSION, UNSPECIFIED DEPRESSION TYPE: ICD-10-CM

## 2021-12-10 DIAGNOSIS — F41.9 ANXIETY: Primary | ICD-10-CM

## 2021-12-10 PROCEDURE — 90834 PSYTX W PT 45 MINUTES: CPT | Performed by: SOCIAL WORKER

## 2021-12-28 ENCOUNTER — OFFICE VISIT (OUTPATIENT)
Dept: DERMATOLOGY | Facility: CLINIC | Age: 31
End: 2021-12-28
Payer: COMMERCIAL

## 2021-12-28 VITALS — WEIGHT: 183.4 LBS | BODY MASS INDEX: 32.49 KG/M2 | TEMPERATURE: 97.7 F

## 2021-12-28 DIAGNOSIS — D22.9 NEVUS: Primary | ICD-10-CM

## 2021-12-28 DIAGNOSIS — Z13.89 SCREENING FOR SKIN CONDITION: ICD-10-CM

## 2021-12-28 PROCEDURE — 1036F TOBACCO NON-USER: CPT | Performed by: DERMATOLOGY

## 2021-12-28 PROCEDURE — 99213 OFFICE O/P EST LOW 20 MIN: CPT | Performed by: DERMATOLOGY

## 2022-01-07 ENCOUNTER — SOCIAL WORK (OUTPATIENT)
Dept: BEHAVIORAL/MENTAL HEALTH CLINIC | Facility: CLINIC | Age: 32
End: 2022-01-07
Payer: COMMERCIAL

## 2022-01-07 DIAGNOSIS — F33.41 RECURRENT MAJOR DEPRESSIVE DISORDER, IN PARTIAL REMISSION (HCC): Primary | ICD-10-CM

## 2022-01-07 PROCEDURE — 90834 PSYTX W PT 45 MINUTES: CPT | Performed by: SOCIAL WORKER

## 2022-01-07 NOTE — PSYCH
12:00pm-12:40pm    Assessment/Plan: f/u in two weeks     There are no diagnoses linked to this encounter  Subjective: Therapist met w/pt for individual session  Pt stated she hasn't been taking her new medication consistently due to not feeling well  She stated that she felt an increase in anxiety and didn't feel as if any symptoms improved  She shared she has been more emotional and feels either depressed/anxious constantly  Therapist continued to talk about pt's current symptoms and therapist recommended pt be referred to [facility] due to severity/intensity and not finding the right medication to help  Therapist educated pt on what PHP was  Pt stated hse would think about it and talk to her  about it but isn't sure  Pt stated that one positive thing is that she does see her  as more of a support and not a burden  Patient ID: Joy Wahl is a 28 y o  female  HPI 45 minutes    Review of Systems      Objective: Pt appeared to be emotional and guarded         Physical Exam  Pt denied any SI/HI/Ah/VH

## 2022-01-20 ENCOUNTER — APPOINTMENT (OUTPATIENT)
Dept: LAB | Facility: HOSPITAL | Age: 32
End: 2022-01-20
Payer: COMMERCIAL

## 2022-01-20 DIAGNOSIS — Z00.00 HEALTHCARE MAINTENANCE: ICD-10-CM

## 2022-01-20 LAB
ALBUMIN SERPL BCP-MCNC: 4.7 G/DL (ref 3.5–5)
ALP SERPL-CCNC: 58 U/L (ref 46–116)
ALT SERPL W P-5'-P-CCNC: 19 U/L (ref 12–78)
ANION GAP SERPL CALCULATED.3IONS-SCNC: 9 MMOL/L (ref 4–13)
AST SERPL W P-5'-P-CCNC: 20 U/L (ref 5–45)
BASOPHILS # BLD AUTO: 0.05 THOUSANDS/ΜL (ref 0–0.1)
BASOPHILS NFR BLD AUTO: 1 % (ref 0–1)
BILIRUB SERPL-MCNC: 0.64 MG/DL (ref 0.2–1)
BUN SERPL-MCNC: 9 MG/DL (ref 5–25)
CALCIUM SERPL-MCNC: 9.5 MG/DL (ref 8.3–10.1)
CHLORIDE SERPL-SCNC: 102 MMOL/L (ref 100–108)
CHOLEST SERPL-MCNC: 200 MG/DL
CO2 SERPL-SCNC: 29 MMOL/L (ref 21–32)
CREAT SERPL-MCNC: 0.98 MG/DL (ref 0.6–1.3)
EOSINOPHIL # BLD AUTO: 0.07 THOUSAND/ΜL (ref 0–0.61)
EOSINOPHIL NFR BLD AUTO: 1 % (ref 0–6)
ERYTHROCYTE [DISTWIDTH] IN BLOOD BY AUTOMATED COUNT: 12.3 % (ref 11.6–15.1)
GFR SERPL CREATININE-BSD FRML MDRD: 77 ML/MIN/1.73SQ M
GLUCOSE P FAST SERPL-MCNC: 86 MG/DL (ref 65–99)
HCT VFR BLD AUTO: 44.2 % (ref 34.8–46.1)
HDLC SERPL-MCNC: 74 MG/DL
HGB BLD-MCNC: 15.1 G/DL (ref 11.5–15.4)
IMM GRANULOCYTES # BLD AUTO: 0.02 THOUSAND/UL (ref 0–0.2)
IMM GRANULOCYTES NFR BLD AUTO: 0 % (ref 0–2)
LDLC SERPL CALC-MCNC: 115 MG/DL (ref 0–100)
LYMPHOCYTES # BLD AUTO: 1.35 THOUSANDS/ΜL (ref 0.6–4.47)
LYMPHOCYTES NFR BLD AUTO: 20 % (ref 14–44)
MCH RBC QN AUTO: 30.7 PG (ref 26.8–34.3)
MCHC RBC AUTO-ENTMCNC: 34.2 G/DL (ref 31.4–37.4)
MCV RBC AUTO: 90 FL (ref 82–98)
MONOCYTES # BLD AUTO: 0.43 THOUSAND/ΜL (ref 0.17–1.22)
MONOCYTES NFR BLD AUTO: 7 % (ref 4–12)
NEUTROPHILS # BLD AUTO: 4.72 THOUSANDS/ΜL (ref 1.85–7.62)
NEUTS SEG NFR BLD AUTO: 71 % (ref 43–75)
NONHDLC SERPL-MCNC: 126 MG/DL
NRBC BLD AUTO-RTO: 0 /100 WBCS
PLATELET # BLD AUTO: 287 THOUSANDS/UL (ref 149–390)
PMV BLD AUTO: 9.7 FL (ref 8.9–12.7)
POTASSIUM SERPL-SCNC: 4.6 MMOL/L (ref 3.5–5.3)
PROT SERPL-MCNC: 8.1 G/DL (ref 6.4–8.2)
RBC # BLD AUTO: 4.92 MILLION/UL (ref 3.81–5.12)
SODIUM SERPL-SCNC: 140 MMOL/L (ref 136–145)
TRIGL SERPL-MCNC: 56 MG/DL
TSH SERPL DL<=0.05 MIU/L-ACNC: 2.36 UIU/ML (ref 0.36–3.74)
WBC # BLD AUTO: 6.64 THOUSAND/UL (ref 4.31–10.16)

## 2022-01-20 PROCEDURE — 80053 COMPREHEN METABOLIC PANEL: CPT

## 2022-01-20 PROCEDURE — 84443 ASSAY THYROID STIM HORMONE: CPT

## 2022-01-20 PROCEDURE — 36415 COLL VENOUS BLD VENIPUNCTURE: CPT

## 2022-01-20 PROCEDURE — 80061 LIPID PANEL: CPT

## 2022-01-20 PROCEDURE — 85025 COMPLETE CBC W/AUTO DIFF WBC: CPT

## 2022-01-21 ENCOUNTER — SOCIAL WORK (OUTPATIENT)
Dept: BEHAVIORAL/MENTAL HEALTH CLINIC | Facility: CLINIC | Age: 32
End: 2022-01-21
Payer: COMMERCIAL

## 2022-01-21 DIAGNOSIS — F41.9 ANXIETY: ICD-10-CM

## 2022-01-21 DIAGNOSIS — R09.82 PND (POST-NASAL DRIP): ICD-10-CM

## 2022-01-21 DIAGNOSIS — F33.41 RECURRENT MAJOR DEPRESSIVE DISORDER, IN PARTIAL REMISSION (HCC): Primary | ICD-10-CM

## 2022-01-21 PROCEDURE — 90834 PSYTX W PT 45 MINUTES: CPT | Performed by: SOCIAL WORKER

## 2022-01-21 RX ORDER — FLUTICASONE PROPIONATE 50 MCG
1 SPRAY, SUSPENSION (ML) NASAL DAILY
Qty: 18.2 ML | Refills: 0 | Status: SHIPPED | OUTPATIENT
Start: 2022-01-21 | End: 2022-05-05 | Stop reason: SDUPTHER

## 2022-01-21 NOTE — PSYCH
29:34KB-55:[id number]    Assessment/Plan: f/u in two weeks     There are no diagnoses linked to this encounter  Subjective: Therapist met w/pt for individual session  Pt stated she got back on the original medication she was on and is hoping that this helps  She stated that she remembers that it worked she just didn't like certain side effects  Pt stated that now experimenting with different mediations she realizes that it may be worth it  Pt stated that she is feeling a little bit more motivated to do things and a little more hopeful  Pt shared that has begun to work on her thesis again and put her energy into that  She reports struggling this time of year but trying to work through  Pt identified her main goal as wanting to learn how to deal w/her overwhelming emotions  Therapist and pt discussed different ways to begin to work towards this goal  Pt will begin to track her moods daily and bring it back into session to evaluate any warning signs she exhibits leading up to mood instability  Patient ID: Wendy Vogel is a 28 y o  female  HPI 45 minutes    Review of Systems      Objective: Pt appeared to be in a good mood and was easily engaged         Physical Exam  Pt denied any SI/HI/AH/VH

## 2022-02-18 ENCOUNTER — SOCIAL WORK (OUTPATIENT)
Dept: BEHAVIORAL/MENTAL HEALTH CLINIC | Facility: CLINIC | Age: 32
End: 2022-02-18
Payer: COMMERCIAL

## 2022-02-18 DIAGNOSIS — F41.9 ANXIETY: Primary | ICD-10-CM

## 2022-02-18 PROCEDURE — 90834 PSYTX W PT 45 MINUTES: CPT | Performed by: SOCIAL WORKER

## 2022-02-18 NOTE — PSYCH
9: 15am-10:00am    Assessment/Plan: f/u in two weeks     There are no diagnoses linked to this encounter  Subjective: Therapist met w/pt for individual session  Pt shared that she met w/the psychiatrist again and an additional medication has been added  She stated she started it yesterday so she hasn't noticed a difference  Pt shared her mood tracking assignment  Discussion was held around the different emotions she experienced and the different "themes "  Therapist and pt discussed different coping skills she tried to implement and reviewed coping skill handout therapist provided last session  Therapist educated pt on CBT and thought logs  Pt will practice utilizing thought logs given  Patient ID: Beryle Brew is a 33 y o  female  HPI 45 minutes    Review of Systems      Objective: pt appeared to be calm and in a good mood         Physical Exam  Pt denied any Si/HI/Ah/Vh

## 2022-03-01 PROBLEM — F90.9 ADHD: Status: ACTIVE | Noted: 2021-12-10

## 2022-03-01 RX ORDER — FLUOXETINE HYDROCHLORIDE 20 MG/1
CAPSULE ORAL
COMMUNITY
Start: 2022-01-21

## 2022-03-01 RX ORDER — CLONAZEPAM 0.5 MG/1
TABLET, ORALLY DISINTEGRATING ORAL
COMMUNITY
Start: 2022-01-21

## 2022-03-01 RX ORDER — FLUOXETINE HYDROCHLORIDE 40 MG/1
CAPSULE ORAL
COMMUNITY
Start: 2022-02-11

## 2022-03-01 RX ORDER — DEXTROAMPHETAMINE SACCHARATE, AMPHETAMINE ASPARTATE MONOHYDRATE, DEXTROAMPHETAMINE SULFATE AND AMPHETAMINE SULFATE 2.5; 2.5; 2.5; 2.5 MG/1; MG/1; MG/1; MG/1
CAPSULE, EXTENDED RELEASE ORAL
COMMUNITY
Start: 2021-12-16

## 2022-03-01 NOTE — PROGRESS NOTES
Diagnoses and all orders for this visit:    Encounter for gynecological examination without abnormal finding      Health Maintenance:    Last PAP:  02/07/2020 Neg  Next PAP Due: 2023    Last Mammogram:  advised age 37  Next Mammogram:    Last Colonoscopy: advised age [address]  Gardisil:  Completed    Pleasant 33 y o  premenopausal female here for annual exam  Suellen Benson,  GYN hx includes: OCP use since age 16, Paraguard, removed, disliked D/t heavy bleeding, Lesa Hull IUD present and due to be changed  No personal Hx of breast, cervical, ovarian or colon CA  Family hx of: Mother with [facility], MGM with BC, has appt with genetics in April  Reports amenorrheic with Lesa Hull, had 1 episode of bleeding this month  Denies history of abnormal pap smears  Denies vaginal, urinary or breast issues, today  Denies pelvic pain & dyspareunia  Sexually active  Monogamous relationship, declines STD testing including/excluding blood work      Denies any issues with her BCM, which is Lesa Hull, will make appt to have removed and replaced, she would like Mirena   Denies intimate partner violence    Past Medical History:   Diagnosis Date    Abnormal Pap smear of cervix     Chlamydia     Rheumatoid arthritis ([facility] [address] )     Varicella      Past Surgical History:   Procedure Laterality Date    APPENDECTOMY      WISDOM TOOTH EXTRACTION         Immunization History   Administered Date(s) Administered    COVID-19 PFIZER VACCINE 0 3 ML IM 03/18/2021, 04/19/2021, 08/20/2021    DTP 1990, [birth year]    DTaP 12/21/1992, 05/21/1996    HPV Quadrivalent 02/13/2007, 04/13/2007, 08/13/2007    Hep A, adult 02/07/2012    Hep B, Adolescent or Pediatric 05/21/1996, 06/16/2000, 04/29/2003    HiB 1990, 1990, 1990, 05/15/1992    Influenza, recombinant, quadrivalent,injectable, preservative free 11/12/2021    MMR 05/15/1992, 06/16/2000    Meningococcal Conjugate (Beata Pelletier) 08/21/2006, 07/22/2011    Meningococcal MCV4P Likely from detox and hepatic encephalopathy.    08/21/2006, 07/22/2011    OPV 1990, 1990, 12/21/1992, 05/21/1996    Tdap 02/13/2007, 09/23/2017, 11/12/2021    Tuberculin Skin Test-PPD Intradermal 1991, 12/21/1992, 08/21/2006, 07/20/2011    Varicella 04/30/1998       Family History   Problem Relation Age of Onset    Mental illness Mother     Depression Mother     Rheum arthritis Mother     Breast cancer Mother     Esophageal cancer Father     Mental illness Sister     Rheum arthritis Sister     Mental illness Sister     Rheum arthritis Sister     Breast cancer Maternal Grandmother     Colon cancer Neg Hx     Ovarian cancer Neg Hx     Cervical cancer Neg Hx     Uterine cancer Neg Hx      Social History     Tobacco Use    Smoking status: Never Smoker    Smokeless tobacco: Never Used   Vaping Use    Vaping Use: Never used   Substance Use Topics    Alcohol use:  Yes    Drug use: Yes     Types: Marijuana       Current Outpatient Medications:     cetirizine (ZyrTEC) 5 MG tablet, Take 5 mg by mouth daily, Disp: , Rfl:     clonazePAM (KlonoPIN) 0 5 MG disintegrating tablet, , Disp: , Rfl:     FLUoxetine (PROzac) 40 MG capsule, , Disp: , Rfl:     fluticasone (FLONASE) 50 mcg/act nasal spray, 1 spray into each nostril daily, Disp: 18 2 mL, Rfl: 0    hydroxychloroquine (PLAQUENIL) 200 mg tablet, Take 200 mg by mouth, Disp: , Rfl:     Melatonin 5 MG TABS, Take 5 mg by mouth Take 2 tablets by mouth at night as needed, Disp: , Rfl:     predniSONE 5 mg tablet, TAKE 2-3 TABLETS BY MOUTH EVERY DAY IN THE MORNING FOR 2 TO 3 DAYS AS NEEDED, Disp: , Rfl:     traZODone (DESYREL) 50 mg tablet, , Disp: , Rfl:     amphetamine-dextroamphetamine (ADDERALL XR) 10 MG 24 hr capsule, , Disp: , Rfl:     EPINEPHrine (EPIPEN) 0 3 mg/0 3 mL SOAJ, Inject 0 3 mL (0 3 mg total) into a muscle once for 1 dose, Disp: 0 6 mL, Rfl: 0    FLUoxetine (PROzac) 20 mg capsule, , Disp: , Rfl:   Patient Active Problem List    Diagnosis Date Noted    ADHD 12/10/2021    Annual physical exam 2021    Encounter for IUD insertion 2020    Encounter for gynecological examination without abnormal finding 2020    Encounter for removal of intrauterine contraceptive device (IUD) 2020    Encounter for initial prescription of contraceptive pills 2020    Screening for cervical cancer 2020    Anxiety 2020    Recurrent major depressive disorder, in partial [facility] ([facility] ) 2020    Rheumatoid arthritis of multiple sites with negative rheumatoid factor ([place] [address] ) 2020    BMI 30 0-30 9,adult 2020    Major depressive disorder 2009       Allergies   Allergen Reactions    Sulfa Antibiotics Rash       OB History    Para Term  AB Living   0 0 0 0 0 0   SAB IAB Ectopic Multiple Live Births   0 0 0 0 0       Vitals:    22 0754   BP: 110/70   BP Location: Left arm   Patient Position: Sitting   Cuff Size: Standard   Weight: 81 2 kg (179 lb)   Height: 5' 3" (1 6 m)     Body mass index is 31 71 kg/m²  Review of Systems     Constitutional: Negative for chills, fatigue, fever, headaches, visual disturbances, and unexpected weight change  Respiratory: Negative for cough, & shortness of breath  Cardiovascular: Negative for chest pain       Gastrointestinal: Negative for Abd pain, nausea & vomiting, constipation and diarrhea  Genitourinary: Negative for difficulty urinating, dysuria, hematuria, dyspareunia, unusual vaginal bleeding or discharge  Skin: Negative skin changes    Physical Exam     Constitutional: Alert & Oriented x3, well-developed and well-nourished  No distress  HENT: Atraumatic, Normocephalic, Conjunctivae clear  Neck: Normal range of motion  Neck supple  No thyromegaly, mass, nodules or tenderness  Pulmonary: Effort normal  Lungs clear to ascultation bilateral  Cardiac: RRR, no murmur   Abdominal: Soft   No tenderness or masses  Musculoskeletal: Normal ROM  Skin: Warm & Dry  Psychological: Normal mood, thought content, behavior & judgement     Breasts:   Right: tissue soft without masses, tenderness, skin changes or nipple discharge  No areas of erythema or pain  No subclavicular, axillary, pectoral adenopathy  Left:  tissue soft without masses, tenderness, skin changes or nipple discharge  No areas of erythema or pain  No subclavicular, axillary, pectoral adenopathy    Pelvic exam was performed with patient supine, lithotomy position  Labia: Negative rash, tenderness, lesion or injury on the right labia  Negative rash, tenderness, lesion or injury on the left labia  Urethral meatus:  Negative for  tenderness, inflammation or discharge  Uterus: not deviated, enlarged, fixed or tender  Cervix: No CMT, no discharge or friability  Right adnexa: no mass, no tenderness and no fullness  Left adnexa: no mass, no tenderness and no fullness  Vagina: No erythema, tenderness, masses, or foreign body in the vagina  No signs of injury around the vagina  No unusual vaginal discharge   Perineum without lesions, signs of injury, erythema or swelling  Inguinal Canal:        Right: No inguinal adenopathy or hernia present  Left: No inguinal adenopathy or hernia present  Perineal hygiene reviewed   Weight bearing exercises minium of 150 mins/weekly advised  Kegel exercises recommended  SBE encouraged, ASCCP guidelines reviewed  Condoms encouraged with all sexual activity to prevent STI's  Gardisil vaccines recommended up to age 39  Calcium/ Vit D dietary requirements discussed,   Advised to call with any issues,  all concerns & questions addressed     See provided information in your after visit summary     F/U Annually and PRN

## 2022-03-01 NOTE — PATIENT INSTRUCTIONS
Breast Self Exam for Women   AMBULATORY CARE:   A breast self-exam (BSE)  is a way to check your breasts for lumps and other changes  Regular BSEs can help you know how your breasts normally look and feel  Most breast lumps or changes are not cancer, but you should always have them checked by a healthcare provider  Why you should do a BSE:  Breast cancer is the most common type of cancer in women  Even if you have mammograms, you may still want to do a BSE regularly  If you know how your breasts normally feel and look, it may help you know when to contact your healthcare provider  Mammograms can miss some cancers  You may find a lump during a BSE that did not show up on a mammogram   When you should do a BSE:  If you have periods, you may want to do your BSE 1 week after your period ends  This is the time when your breasts may be the least swollen, lumpy, or tender  You can do regular BSEs even if you are breastfeeding or have breast implants  Call your doctor if:   · You find any lumps or changes in your breasts  · You have breast pain or fluid coming from your nipples  · You have questions or concerns about your condition or care  How to do a BSE:       · Look at your breasts in a mirror  Look at the size and shape of each breast and nipple  Check for swelling, lumps, dimpling, scaly skin, or other skin changes  Look for nipple changes, such as a nipple that is painful or beginning to pull inward  Gently squeeze both nipples and check to see if fluid (that is not breast milk) comes out of them  If you find any of these or other breast changes, contact your healthcare provider  Check your breasts while you sit or  the following 3 positions:    ? Hang your arms down at your sides  ? Raise your hands and join them behind your head  ? Put firm pressure with your hands on your hips  Bend slightly forward while you look at your breasts in the mirror  · Lie down and feel your breasts    When you lie down, your breast tissue spreads out evenly over your chest  This makes it easier for you to feel for lumps and anything that may not be normal for your breasts  Do a BSE on one breast at a time  ? Place a small pillow or towel under your left shoulder  Put your left arm behind your head  ? Use the 3 middle fingers of your right hand  Use your fingertip pads, on the top of your fingers  Your fingertip pad is the most sensitive part of your finger  ? Use small circles to feel your breast tissue  Use your fingertip pads to make dime-sized, overlapping circles on your breast and armpits  Use light, medium, and firm pressure  First, press lightly  Second, press with medium pressure to feel a little deeper into the breast  Last, use firm pressure to feel deep within your breast     ? Examine your entire breast area  Examine the breast area from above the breast to below the breast where you feel only ribs  Make small circles with your fingertips, starting in the middle of your armpit  Make circles going up and down the breast area  Continue toward your breast and all the way across it  Examine the area from your armpit all the way over to the middle of your chest (breastbone)  Stop at the middle of your chest     ? Move the pillow or towel to your right shoulder, and put your right arm behind your head  Use the 3 fingertip pads of your left hand, and repeat the above steps to do a BSE on your right breast     What else you can do to check for breast problems or cancer:  Talk to your healthcare provider about mammograms  A mammogram is an x-ray of your breasts to screen for breast cancer or other problems  Your provider can tell you the benefits and risks of mammograms  The first mammogram is usually at age 39 or 48  Your provider may recommend you start at 36 or younger if your risk for breast cancer is high  Mammograms usually continue every 1 to 2 years until age 76         Follow up with your doctor as directed:  Write down your questions so you remember to ask them during your visits  © Copyright [employer] 2022 Information is for End User's use only and may not be sold, redistributed or otherwise used for commercial purposes  All illustrations and images included in CareNotes® are the copyrighted property of A "[employer] A M , Inc  or Ronny Lewis  The above information is an  only  It is not intended as medical advice for individual conditions or treatments  Talk to your doctor, nurse or pharmacist before following any medical regimen to see if it is safe and effective for you  Wellness Visit for Adults   AMBULATORY CARE:   A wellness visit  is when you see your healthcare provider to get screened for health problems  Your healthcare provider will also give you advice on how to stay healthy  Write down your questions so you remember to ask them  Ask your healthcare provider how often you should have a wellness visit  What happens at a wellness visit:  Your healthcare provider will ask about your health, and your family history of health problems  This includes high blood pressure, heart disease, and cancer  He or she will ask if you have symptoms that concern you, if you smoke, and about your mood  You may also be asked about your intake of medicines, supplements, food, and alcohol  Any of the following may be done:  · Your weight  will be checked  Your height may also be checked so your body mass index (BMI) can be calculated  Your BMI shows if you are at a healthy weight  · Your blood pressure  and heart rate will be checked  Your temperature may also be checked  · Blood and urine tests  may be done  Blood tests may be done to check your cholesterol levels  Abnormal cholesterol levels increase your risk for heart disease and stroke  You may also need a blood or urine test to check for diabetes if you are at increased risk   Urine tests may be done to look for signs of an infection or kidney disease  · A physical exam  includes checking your heartbeat and lungs with a stethoscope  Your healthcare provider may also check your skin to look for sun damage  · Screening tests  may be recommended  A screening test is done to check for diseases that may not cause symptoms  The screening tests you may need depend on your age, gender, family history, and lifestyle habits  For example, colorectal screening may be recommended if you are 48years old or older  Screening tests you need if you are a woman:   · A Pap smear  is used to screen for cervical cancer  Pap smears are usually done every 3 to 5 years depending on your age  You may need them more often if you have had abnormal Pap smear test results in the past  Ask your healthcare provider how often you should have a Pap smear  · A mammogram  is an x-ray of your breasts to screen for breast cancer  Experts recommend mammograms every 2 years starting at age 48 years  You may need a mammogram at age 52 years or younger if you have an increased risk for breast cancer  Talk to your healthcare provider about when you should start having mammograms and how often you need them  Vaccines you may need:   · Get an influenza vaccine  every year  The influenza vaccine protects you from the flu  Several types of viruses cause the flu  The viruses change over time, so new vaccines are made each year  · Get a tetanus-diphtheria (Td) booster vaccine  every 10 years  This vaccine protects you against tetanus and diphtheria  Tetanus is a severe infection that may cause painful muscle spasms and lockjaw  Diphtheria is a severe bacterial infection that causes a thick covering in the back of your mouth and throat  · Get a human papillomavirus (HPV) vaccine  if you are female and aged 23 to 32 or male 23 to 24 and never received it  This vaccine protects you from HPV infection  HPV is the most common infection spread by sexual contact   HPV may also cause vaginal, penile, and anal cancers  · Get a pneumococcal vaccine  if you are aged 72 years or older  The pneumococcal vaccine is an injection given to protect you from pneumococcal disease  Pneumococcal disease is an infection caused by pneumococcal bacteria  The infection may cause pneumonia, meningitis, or an ear infection  · Get a shingles vaccine  if you are 60 or older, even if you have had shingles before  The shingles vaccine is an injection to protect you from the varicella-zoster virus  This is the same virus that causes chickenpox  Shingles is a painful rash that develops in people who had chickenpox or have been exposed to the virus  How to eat healthy:  My Plate is a model for planning healthy meals  It shows the types and amounts of foods that should go on your plate  Fruits and vegetables make up about half of your plate, and grains and protein make up the other half  A serving of dairy is included on the side of your plate  The amount of calories and serving sizes you need depends on your age, gender, weight, and height  Examples of healthy foods are listed below:  · Eat a variety of vegetables  such as dark green, red, and orange vegetables  You can also include canned vegetables low in sodium (salt) and frozen vegetables without added butter or sauces  · Eat a variety of fresh fruits , canned fruit in 100% juice, frozen fruit, and dried fruit  · Include whole grains  At least half of the grains you eat should be whole grains  Examples include whole-wheat bread, wheat pasta, brown rice, and whole-grain cereals such as oatmeal     · Eat a variety of protein foods such as seafood (fish and shellfish), lean meat, and poultry without skin (turkey and chicken)  Examples of lean meats include pork leg, shoulder, or tenderloin, and beef round, sirloin, tenderloin, and extra lean ground beef   Other protein foods include eggs and egg substitutes, beans, peas, soy products, nuts, and seeds  · Choose low-fat dairy products such as skim or 1% milk or low-fat yogurt, cheese, and cottage cheese  · Limit unhealthy fats  such as butter, hard margarine, and shortening  Exercise:  Exercise at least 30 minutes per day on most days of the week  Some examples of exercise include walking, biking, dancing, and swimming  You can also fit in more physical activity by taking the stairs instead of the elevator or parking farther away from stores  Include muscle strengthening activities 2 days each week  Regular exercise provides many health benefits  It helps you manage your weight, and decreases your risk for type 2 diabetes, heart disease, stroke, and high blood pressure  Exercise can also help improve your mood  Ask your healthcare provider about the best exercise plan for you  General health and safety guidelines:   · Do not smoke  Nicotine and other chemicals in cigarettes and cigars can cause lung damage  Ask your healthcare provider for information if you currently smoke and need help to quit  E-cigarettes or smokeless tobacco still contain nicotine  Talk to your healthcare provider before you use these products  · Limit alcohol  A drink of alcohol is 12 ounces of beer, 5 ounces of wine, or 1½ ounces of liquor  · Lose weight, if needed  Being overweight increases your risk of certain health conditions  These include heart disease, high blood pressure, type 2 diabetes, and certain types of cancer  · Protect your skin  Do not sunbathe or use tanning beds  Use sunscreen with a SPF 15 or higher  Apply sunscreen at least 15 minutes before you go outside  Reapply sunscreen every 2 hours  Wear protective clothing, hats, and sunglasses when you are outside  · Drive safely  Always wear your seatbelt  Make sure everyone in your car wears a seatbelt  A seatbelt can save your life if you are in an accident  Do not use your cell phone when you are driving   This could distract you and cause an accident  Pull over if you need to make a call or send a text message  · Practice safe sex  Use latex condoms if are sexually active and have more than one partner  Your healthcare provider may recommend screening tests for sexually transmitted infections (STIs)  · Wear helmets, lifejackets, and protective gear  Always wear a helmet when you ride a bike or motorcycle, go skiing, or play sports that could cause a head injury  Wear protective equipment when you play sports  Wear a lifejacket when you are on a boat or doing water sports  © Copyright [employer] 2022 Information is for End User's use only and may not be sold, redistributed or otherwise used for commercial purposes  All illustrations and images included in CareNotes® are the copyrighted property of ESTELA PALMER [employer]  or Ronny Lewis  The above information is an  only  It is not intended as medical advice for individual conditions or treatments  Talk to your doctor, nurse or pharmacist before following any medical regimen to see if it is safe and effective for you  HPV (Human Papillomavirus) Vaccine for Adults   AMBULATORY CARE:   The human papillomavirus (HPV) vaccine  is an injection given to females and males to protect against human papillomavirus infection  HPV is most commonly spread through sexual activity  It can also be spread from a mother to her baby during delivery  The HPV vaccine is most effective if given before sexual activity begins  This allows your body to build almost complete protection against HPV before you have contact with the virus  The HPV vaccine is still effective after sexual activity has begun  How the vaccine is given:  The HPV vaccine can be given with other vaccines  The vaccine is given in 2 or 3 doses through age 32:  · The first dose  is given at any time  · The second dose  is given 1 to 2 months after the first dose      · The third dose, if needed,  is given 6 months after the first dose  Reasons you should not get the HPV vaccine, or should wait to get it:   · You had a severe allergic reaction to a dose of the vaccine  · You are pregnant  Your healthcare provider will tell you when you can get the vaccine  · You are sick or have a fever  You may need to wait to get the vaccine until symptoms go away  Risks of the HPV vaccine: You may have pain, redness, or swelling where the shot was given  You may have a fever or headache  You may have an allergic reaction to the vaccine  This can be life-threatening  Call your local emergency number (911 in the [address]) if:   · You have signs of a severe allergic reaction, such as trouble breathing, hives, or wheezing  Seek care immediately if:   · You have a high fever or behavior changes that concern you  Call your doctor if:   · You have questions or concerns about the HPV vaccine  Apply a warm compress  to the area to relieve swelling and pain  Follow up with your doctor as directed:  Write down your questions so you remember to ask them during your visits  © Copyright [employer] 2022 Information is for End User's use only and may not be sold, redistributed or otherwise used for commercial purposes  All illustrations and images included in CareNotes® are the copyrighted property of A D A M , Inc  or [address] [place], [place] [address]sherrie   The above information is an  only  It is not intended as medical advice for individual conditions or treatments  Talk to your doctor, nurse or pharmacist before following any medical regimen to see if it is safe and effective for you  Perineal Hygiene     No soaps or feminine wash to the vulva  Use only water to cleanse, or water with Dove or [employer] if necessary  No lotion to the area    Use only coconut oil for moisture if needed   No douching     Cotton underware, loose fitting clothing  Only perfume-free, dye-free laundry detergent, use a second rinse cycle   Avoid fabric softeners/dryer sheets  Coconut oil as a lubricant (if not using condoms) or another scent-free lubricant (Astroglide, Uberlube) if needed  Partner to avoid the same products as well  Over the counter probiotic to restore vaginal zeenat may be helpful as well     You may also look into Boric Acid vaginal suppositories to restore vaginal PH balance for up to 2 weeks as directed on the box  You may not use these if you are pregnant      Kegel Exercises for Women   AMBULATORY CARE:   Kegel exercises  help strengthen your pelvic muscles  Pelvic muscles hold your pelvic organs, such as your bladder and uterus, in place  Kegel exercises help prevent or control problems with urine incontinence (leakage)  Incontinence may be caused by pregnancy, childbirth, or menopause  Contact your healthcare provider if:   · You cannot feel your muscles tighten or relax  · You continue to leak urine  · You have questions or concerns about your condition or care  Use the correct muscles:  Pelvic muscles are the muscles you use to control urine flow  To target these muscles, stop and start the flow of urine several times  This will help you become familiar with how it feels to tighten and relax these muscles  How to do Kegel exercises:   · Empty your bladder  You may lie down, stand up, or sit down to do these exercises  When you first try to do these exercises, it may be easier if you lie down  Tighten or squeeze your pelvic muscles slowly  It may feel like you are trying to hold back urine or gas  Hold this position for 3 seconds  Relax for 3 seconds  Repeat this cycle 10 times  · Do 10 sets of Kegel exercises, at least 3 times a day  Do not hold your breath when you do Kegel exercises  Keep your stomach, back, and leg muscles relaxed  · As your muscles get stronger, you will be able to hold the squeeze longer   Your healthcare provider may ask that you increase your pelvic muscle squeeze to 10 seconds  After you squeeze for 10 seconds, relax for 10 seconds  What else you should know:   · Once you know how to do Kegel exercises, use different positions  You can do these exercises while you lie on the floor, sit at your desk or watch TV, and while you stand  · You may notice improved bladder control within about 6 weeks  · Tighten your pelvic muscles before you sneeze, cough, or lift to prevent urine leakage  Follow up with your doctor as directed:  Write down your questions so you remember to ask them during your visits  © Copyright [employer] 2022 Information is for End User's use only and may not be sold, redistributed or otherwise used for commercial purposes  All illustrations and images included in CareNotes® are the copyrighted property of A [employer]  or Ronny Lewis  The above information is an  only  It is not intended as medical advice for individual conditions or treatments  Talk to your doctor, nurse or pharmacist before following any medical regimen to see if it is safe and effective for you

## 2022-03-04 ENCOUNTER — ANNUAL EXAM (OUTPATIENT)
Dept: OBGYN CLINIC | Facility: CLINIC | Age: 32
End: 2022-03-04
Payer: COMMERCIAL

## 2022-03-04 ENCOUNTER — SOCIAL WORK (OUTPATIENT)
Dept: BEHAVIORAL/MENTAL HEALTH CLINIC | Facility: CLINIC | Age: 32
End: 2022-03-04
Payer: COMMERCIAL

## 2022-03-04 VITALS
SYSTOLIC BLOOD PRESSURE: 110 MMHG | BODY MASS INDEX: 31.71 KG/M2 | HEIGHT: 63 IN | WEIGHT: 179 LBS | DIASTOLIC BLOOD PRESSURE: 70 MMHG

## 2022-03-04 DIAGNOSIS — F41.9 ANXIETY: Primary | ICD-10-CM

## 2022-03-04 DIAGNOSIS — F33.41 RECURRENT MAJOR DEPRESSIVE DISORDER, IN PARTIAL REMISSION (HCC): ICD-10-CM

## 2022-03-04 DIAGNOSIS — Z01.419 ENCOUNTER FOR GYNECOLOGICAL EXAMINATION WITHOUT ABNORMAL FINDING: Primary | ICD-10-CM

## 2022-03-04 PROCEDURE — S0612 ANNUAL GYNECOLOGICAL EXAMINA: HCPCS | Performed by: OBSTETRICS & GYNECOLOGY

## 2022-03-04 PROCEDURE — 90834 PSYTX W PT 45 MINUTES: CPT | Performed by: SOCIAL WORKER

## 2022-03-04 NOTE — PSYCH
9: 00am-9:45am    Assessment/Plan: f/u in three weeks     There are no diagnoses linked to this encounter  Subjective: therapist met w/pt for individual session  She stated that she feels the medication has been helping a lot  She stated that she didn't utilize the thought log provided because her anxiety has been well managed  She shared that she is also feeling more motivated to do things and is planning on painting her house this weekend with her   Pt continues to exercise but also is working on having more compassion for herself when she is experiencing "flareups "  Therapist and pt discussed importance of continuing to find a "gray" area rather than engage in black and white thinking  Pt shared she doesn't feel numb but feels more emotionally stable over the past few weeks  Patient ID: Ken Aguirre is a 33 y o  female  HPI 45 minutes    Review of Systems      Objective: pt appeared to be in a good mood         Physical Exam  Pt denied any SI/HI/Ah/VH

## 2022-03-04 NOTE — PROGRESS NOTES
33year old female here for her yearly examination   Patient last Pap 02/07/2020 pap was negative   Patient has the [facility] IUD and it is due to be removed patient will like to switch to the Mirena for longer birth control   Patient has all 3 doses of the HPV vaccines

## 2022-03-25 ENCOUNTER — SOCIAL WORK (OUTPATIENT)
Dept: BEHAVIORAL/MENTAL HEALTH CLINIC | Facility: CLINIC | Age: 32
End: 2022-03-25
Payer: COMMERCIAL

## 2022-03-25 DIAGNOSIS — F90.9 ATTENTION DEFICIT HYPERACTIVITY DISORDER (ADHD), UNSPECIFIED ADHD TYPE: ICD-10-CM

## 2022-03-25 DIAGNOSIS — F33.9 EPISODE OF RECURRENT MAJOR DEPRESSIVE DISORDER, UNSPECIFIED DEPRESSION EPISODE SEVERITY (HCC): ICD-10-CM

## 2022-03-25 DIAGNOSIS — F41.9 ANXIETY: Primary | ICD-10-CM

## 2022-03-25 PROCEDURE — 90834 PSYTX W PT 45 MINUTES: CPT | Performed by: SOCIAL WORKER

## 2022-03-28 NOTE — PSYCH
10:15am-11:00am    Assessment/Plan: f/u in two weeks     There are no diagnoses linked to this encounter  Subjective: Therapist met w/pt for individual session  Pt shared that she has experienced some anxiety symptoms this week  She identified a few situations where she felt her anxiety came out of nowhere    Pt shared that she also just met w/her psychiatrist and her sleep medication was changed  Pt shared she has been working on [employer] w/her  and although it isnt done yet she does feel a sense of accomplishment with what they have so far  Therapist and pt discussed how she still feels more stabilized mood wise and pt wants to begin to address more underlying issues including trauma and self esteem as well as relationships  Patient ID: Gerri Gracia is a 33 y o  female  HPI 45 minutes    Review of Systems      Objective: Pt appeared calm and easily engaged  Pt was alert and oriented x3         Physical Exam  Pt denied any Si/Hi/Ah/VH

## 2022-04-18 ENCOUNTER — TELEPHONE (OUTPATIENT)
Dept: SURGICAL ONCOLOGY | Facility: CLINIC | Age: 32
End: 2022-04-18

## 2022-04-20 ENCOUNTER — CLINICAL SUPPORT (OUTPATIENT)
Dept: GENETICS | Facility: CLINIC | Age: 32
End: 2022-04-20

## 2022-04-20 DIAGNOSIS — Z91.89 AT HIGH RISK FOR BREAST CANCER: Primary | ICD-10-CM

## 2022-04-20 DIAGNOSIS — Z80.3 FAMILY HISTORY OF BREAST CANCER: ICD-10-CM

## 2022-04-20 PROCEDURE — NC001 PR NO CHARGE: Performed by: GENETIC COUNSELOR, MS

## 2022-04-20 NOTE — PROGRESS NOTES
Pre-Test Genetic Counseling Consult Note    Patient Name: Armando Merida   /Age:  y o  Referring Provider: Harvey Molina    Date of Service: 2022  Genetic Counselor: Teresa López MS, [facility]  Interpretation Services: None  [facility]      Fam Evans was referred to the [address] [place] [address] and Genetic Assessment Program due to her family history of breast cancer  she presents today to discuss the possibility of a hereditary cancer syndrome, options for genetic testing, and implications for her and her family  Cancer History and Treatment:   Personal History: no personal history of cancer    Screening Hx:   Breast:  Clinical breast exam: annually, normal  Breast biopsy: none    Colon:  Colonoscopy: none    Gynecologic:  Pelvic/Pap exam on 20, normal  Ovaries/Uterus intact    Skin:  Skin cancer screening annually, normal, will be going every other year    Other screening: none    Reproductive History  Age at menarche: 9  Parity:   Age at first live birth: na  Menopause: pre  Hormone replacement: none    Medical and Surgical History  Pertinent surgical history:   Past Surgical History:   Procedure Laterality Date    APPENDECTOMY      WISDOM TOOTH EXTRACTION        Pertinent medical history:  Past Medical History:   Diagnosis Date    Abnormal Pap smear of cervix     Chlamydia     Rheumatoid arthritis ([facility] [address] )     Varicella          Other History:  Height:   Ht Readings from Last 1 Encounters:   22 5' 3" (1 6 m)     Weight:   Wt Readings from Last 1 Encounters:   22 81 2 kg (179 lb)       Relevant Family History     Reported Ancestry: [place], [place]    Siblings: 2 sisters (43, 39)    Maternal Family History:   Mother (79) history of breast cancer (dx 77)  Grandmother (d 90) history of breast cancer (dx 80)  Aunt (54) history of skin cancer (+exposures and fair skin)  Great grandfather with history of skin cancer    Paternal Family History:   Father (d 66) history of esophageal cancer (dx 58)   Grandmother with history of lung cancer (+exposure)    Please refer to the scanned pedigree in the Media Tab for a complete family history     *All history is reported as provided by the patient; records are not available for review, except where indicated  Assessment:  We discussed sporadic, familial and hereditary cancer  We also discussed the many factors that influence our risk for cancer such as age, environmental exposures, lifestyle choices and family history  We reviewed the indications suggestive of a hereditary predisposition to cancer  Although the personal and family history of cancer/tumors is not consistent with the typical presentation of a hereditary cancer syndrome, genetic testing may be considered to rule out moderately penetrant genes which have clear management recommendations  The risks, benefits, and limitations of genetic testing were reviewed with the patient, as well as genetic discrimination laws, and possible test results (positive, negative, variants of uncertain significance) and their clinical implications  If positive for a mutation, options for managing cancer risk including increased surveillance, chemoprevention, and in some cases prophylactic surgery were discussed  Gaspar Nyhan was informed that if a hereditary cancer syndrome was identified in her, first degree relatives (parents, siblings, and children) have a chance of also inheriting the condition  Genetic testing would allow for predictive genetic testing in other relatives, who may also be at risk depending on their degree of relation  Risk Based on Family History:  Based on the ages that [facility] mother and grandmother were diagnosed, she does not meet NCCN criteria for genetic testing   We reviewed that she does have a small family structure and it is still possible to have a genetic mutation running through a family that does not meet genetics criteria  We also reviewed that [facility] risk for breast cancer may be increased based on her family history  We are able to run risk models to better estimate Angelica's lifetime risk of developing breast cancer  I ran two risk models based on the information Renee Stallings provided during our pre-test counseling session:    Hamleter-Cuzick model: Estimated lifetime risk, to age 80, for breast cancer is 33 8% compared to approximately 13 3% general population risk     Nitish tables: Estimated lifetime risk, to age 78, for breast cancer is 9 8%    The Tyrer-Cuzick model predicts that Angelica's lifetime risk for breast cancer is at least 20% therefore she qualifies for increased breast cancer screening, which is outlined below in the plan section  Plan: At this time Renee Stallings is comfortable proceeding with high risk breast cancer screening and not pursuing genetic testing  Summary:     We discussed that if [facility] mother undergoes testing and is positive, we would encourage her to reach out to our office as she would have a likelihood of carrying a mutation  If her mother is negative, a genetic mutation in the family is unlikely  I will place a referral for her to surgical oncology to touch base regarding a plan for high-risk screening

## 2022-04-20 NOTE — LETTER
2022     Nithin Edgar, [facility]    Patient: Jose Maria Gutierrez  YOB: 1990  Date of Visit: 2022      Dear Dr Mariaa Yuen:    Thank you for referring Jose Maria Gutierrez to me for evaluation  Below are my notes for this consultation  If you have questions, please do not hesitate to call me  I look forward to following your patient along with you  Sincerely,        Kendy Soler        CC: No Recipients        Pre-Test Genetic Counseling Consult Note    Patient Name: Jose Maria Gutierrez   /Age: 1990/32 y o  Referring Provider: Анна Hawk    Date of Service: 2022  Genetic Counselor: Kendy Soler MS, [facility]  Interpretation Services: None  [facility]      Marlen Nieves was referred to the [address] [place] [address] and Genetic Assessment Program due to her family history of breast cancer  she presents today to discuss the possibility of a hereditary cancer syndrome, options for genetic testing, and implications for her and her family           Cancer History and Treatment:   Personal History: no personal history of cancer    Screening Hx:   Breast:  Clinical breast exam: annually, normal  Breast biopsy: none    Colon:  Colonoscopy: none    Gynecologic:  Pelvic/Pap exam on 20, normal  Ovaries/Uterus intact    Skin:  Skin cancer screening annually, normal, will be going every other year    Other screening: none    Reproductive History  Age at menarche: 9  Parity:   Age at first live birth: na  Menopause: pre  Hormone replacement: none    Medical and Surgical History  Pertinent surgical history:   Past Surgical History:   Procedure Laterality Date    APPENDECTOMY      WISDOM TOOTH EXTRACTION        Pertinent medical history:  Past Medical History:   Diagnosis Date    Abnormal Pap smear of cervix     Chlamydia     Rheumatoid arthritis ([facility] [address] )     Varicella          Other History:  Height:   Ht Readings from Last 1 Encounters:   03/04/22 5' 3" (1 6 m)     Weight:   Wt Readings from Last 1 Encounters:   03/04/22 81 2 kg (179 lb)       Relevant Family History     Reported Ancestry: [place], [place]    Siblings: 2 sisters (43, 39)    Maternal Family History: Mother (78) history of breast cancer (dx 77)  Grandmother (d 90) history of breast cancer (dx 80)  Aunt (54) history of skin cancer (+exposures and fair skin)  Great grandfather with history of skin cancer    Paternal Family History:   Father (d 66) history of esophageal cancer (dx 58)   Grandmother with history of lung cancer (+exposure)    Please refer to the scanned pedigree in the Media Tab for a complete family history     *All history is reported as provided by the patient; records are not available for review, except where indicated  Assessment:  We discussed sporadic, familial and hereditary cancer  We also discussed the many factors that influence our risk for cancer such as age, environmental exposures, lifestyle choices and family history  We reviewed the indications suggestive of a hereditary predisposition to cancer  Although the personal and family history of cancer/tumors is not consistent with the typical presentation of a hereditary cancer syndrome, genetic testing may be considered to rule out moderately penetrant genes which have clear management recommendations  The risks, benefits, and limitations of genetic testing were reviewed with the patient, as well as genetic discrimination laws, and possible test results (positive, negative, variants of uncertain significance) and their clinical implications  If positive for a mutation, options for managing cancer risk including increased surveillance, chemoprevention, and in some cases prophylactic surgery were discussed   Nathanael Crockett was informed that if a hereditary cancer syndrome was identified in her, first degree relatives (parents, siblings, and children) have a chance of also inheriting the condition  Genetic testing would allow for predictive genetic testing in other relatives, who may also be at risk depending on their degree of relation  Risk Based on Family History:  Based on the ages that [facility] mother and grandmother were diagnosed, she does not meet NCCN criteria for genetic testing  We reviewed that she does have a small family structure and it is still possible to have a genetic mutation running through a family that does not meet genetics criteria  We also reviewed that [facility] risk for breast cancer may be increased based on her family history  We are able to run risk models to better estimate Angelica's lifetime risk of developing breast cancer  I ran two risk models based on the information David Donis provided during our pre-test counseling session:    Tyrer-Cuzick model: Estimated lifetime risk, to age 80, for breast cancer is 33 8% compared to approximately 13 3% general population risk     Nitish tables: Estimated lifetime risk, to age 78, for breast cancer is 9 8%    The Tyrer-Cuzick model predicts that Masters lifetime risk for breast cancer is at least 20% therefore she qualifies for increased breast cancer screening, which is outlined below in the plan section  Plan: At this time David Donis is comfortable proceeding with high risk breast cancer screening and not pursuing genetic testing  Summary:     We discussed that if [facility] mother undergoes testing and is positive, we would encourage her to reach out to our office as she would have a likelihood of carrying a mutation  If her mother is negative, a genetic mutation in the family is unlikely  I will place a referral for her to surgical oncology to touch base regarding a plan for high-risk screening

## 2022-04-25 ENCOUNTER — TELEPHONE (OUTPATIENT)
Dept: HEMATOLOGY ONCOLOGY | Facility: CLINIC | Age: 32
End: 2022-04-25

## 2022-04-25 NOTE — TELEPHONE ENCOUNTER
Made attempt to schedule new pt apt, Spoke with the patient who stated that she spoke with her insurance and was advised that visit would not be covered      Closing referral

## 2022-06-06 ENCOUNTER — TELEPHONE (OUTPATIENT)
Dept: FAMILY MEDICINE CLINIC | Facility: CLINIC | Age: 32
End: 2022-06-06

## 2022-06-06 NOTE — TELEPHONE ENCOUNTER
Since she just tested positive for Covid on 6/2/2022, when would you recommend her to come in for the booster?

## 2022-06-06 NOTE — TELEPHONE ENCOUNTER
----- Message from [address] [place] [address] sent at 6/2/2022  2:20 PM EDT -----  Regarding: FW: 2nd Covid  Booster  Can you please call and schedule?   ----- Message -----  From: Mimi Hauser MA  Sent: 6/2/2022   7:36 AM EDT  To: SEFERINO Cristobal  Subject: FW: 2nd Covid  Booster                             ----- Message -----  From: Rinku Lynn  Sent: 6/2/2022   6:18 AM EDT  To: , #  Subject: 2nd Covid  Booster                               Hi Maria Luisa,    I'm eligible for a second covid booster because of my RA  I just tested positive for Covid this morning, so I'd like to get my booster when it's safe for me to be out of quarantine again  The [facility]'s website said to contact your primary care provider to schedule an appointment  Please let me know what my next steps are  Thank you!   Alex Hernandez

## 2022-06-16 ENCOUNTER — CLINICAL SUPPORT (OUTPATIENT)
Dept: FAMILY MEDICINE CLINIC | Facility: CLINIC | Age: 32
End: 2022-06-16
Payer: COMMERCIAL

## 2022-06-16 DIAGNOSIS — Z23 NEED FOR COVID-19 VACCINE: Primary | ICD-10-CM

## 2022-06-16 PROCEDURE — 0054A PR IMM ADMN SARSCOV2 30MCG/0.3ML TRIS-SUCROSE BST: CPT

## 2022-06-16 PROCEDURE — 91305 PR SARSCOV2 VACCINE 30MCG/0.3ML TRIS-SUCROSE IM USE: CPT

## 2022-06-20 ENCOUNTER — TELEPHONE (OUTPATIENT)
Dept: FAMILY MEDICINE CLINIC | Facility: CLINIC | Age: 32
End: 2022-06-20

## 2022-06-20 DIAGNOSIS — F41.9 ANXIETY: Primary | ICD-10-CM

## 2022-06-22 ENCOUNTER — TELEPHONE (OUTPATIENT)
Dept: PSYCHIATRY | Facility: CLINIC | Age: 32
End: 2022-06-22

## 2022-06-28 ENCOUNTER — TELEPHONE (OUTPATIENT)
Dept: PSYCHIATRY | Facility: CLINIC | Age: 32
End: 2022-06-28

## 2022-08-17 ENCOUNTER — OFFICE VISIT (OUTPATIENT)
Dept: FAMILY MEDICINE CLINIC | Facility: CLINIC | Age: 32
End: 2022-08-17
Payer: COMMERCIAL

## 2022-08-17 VITALS
OXYGEN SATURATION: 99 % | HEART RATE: 73 BPM | HEIGHT: 63 IN | SYSTOLIC BLOOD PRESSURE: 112 MMHG | TEMPERATURE: 98.3 F | BODY MASS INDEX: 31.71 KG/M2 | DIASTOLIC BLOOD PRESSURE: 70 MMHG

## 2022-08-17 DIAGNOSIS — M06.09 RHEUMATOID ARTHRITIS OF MULTIPLE SITES WITH NEGATIVE RHEUMATOID FACTOR (HCC): ICD-10-CM

## 2022-08-17 DIAGNOSIS — H69.83 DYSFUNCTION OF BOTH EUSTACHIAN TUBES: Primary | ICD-10-CM

## 2022-08-17 DIAGNOSIS — R09.82 PND (POST-NASAL DRIP): ICD-10-CM

## 2022-08-17 DIAGNOSIS — M05.9 SEROPOSITIVE RHEUMATOID ARTHRITIS (HCC): ICD-10-CM

## 2022-08-17 PROCEDURE — 99213 OFFICE O/P EST LOW 20 MIN: CPT | Performed by: FAMILY MEDICINE

## 2022-08-17 RX ORDER — AMOXICILLIN 500 MG/1
500 CAPSULE ORAL EVERY 8 HOURS SCHEDULED
Qty: 30 CAPSULE | Refills: 0 | Status: SHIPPED | OUTPATIENT
Start: 2022-08-17 | End: 2022-08-27

## 2022-08-17 RX ORDER — FLUTICASONE PROPIONATE 50 MCG
1 SPRAY, SUSPENSION (ML) NASAL DAILY
Qty: 18.2 ML | Refills: 0 | Status: SHIPPED | OUTPATIENT
Start: 2022-08-17

## 2022-08-17 NOTE — PROGRESS NOTES
Assessment/Plan:     Chronic Problems:  No problem-specific Assessment & Plan notes found for this encounter  Visit Diagnosis:  Diagnoses and all orders for this visit:    Dysfunction of both eustachian tubes  Comments:   discussed plan care recommended restart Flonase abx f/u prn  Orders:  -     amoxicillin (AMOXIL) 500 mg capsule; Take 1 capsule (500 mg total) by mouth every 8 (eight) hours for 10 days    PND (post-nasal drip)  Comments:   Flonase, OTC  antihistamine  Orders:  -     fluticasone (FLONASE) 50 mcg/act nasal spray; 1 spray into each nostril daily  -     amoxicillin (AMOXIL) 500 mg capsule; Take 1 capsule (500 mg total) by mouth every 8 (eight) hours for 10 days    Seropositive rheumatoid arthritis (HCC)    Rheumatoid arthritis of multiple sites with negative rheumatoid factor ([facility] [address] )    Other orders  -     Brexpiprazole (REXULTI) 0 5 MG tablet; Take 0 5 mg by mouth daily Per pt taking 1mg daily          Subjective:    Patient ID: Trinity Lowery is a 33 y o  female  Here for cough nasal congestion ears popping   x1 week   has tested self COVID negative x2   negative fever chills slight sore throat negative rash lesion negative shortness of breath difficulty breathing   self treatment has not been able to use Flonase needs med refill   negative ill contact   presently working [employer]  ra , treatment provider by md kofi cuba , rheum , [facility]     Cough  The current episode started in the past 7 days  The following portions of the patient's history were reviewed and updated as appropriate: allergies, current medications, past family history, past medical history, past social history, past surgical history and problem list     Review of Systems   Respiratory: Positive for cough            /70   Pulse 73   Temp 98 3 °F (36 8 °C)   Ht 5' 3" (1 6 m)   SpO2 99%   BMI 31 71 kg/m²   Social History     Socioeconomic History    Marital status: /Civil Union Spouse name: Not on file    Number of children: Not on file    Years of education: Not on file    Highest education level: Not on file   Occupational History    Not on file   Tobacco Use    Smoking status: Never Smoker    Smokeless tobacco: Never Used   Vaping Use    Vaping Use: Never used   Substance and Sexual Activity    Alcohol use: Yes    Drug use: Yes     Types: Marijuana    Sexual activity: Yes     Partners: Male     Birth control/protection: I U D       Comment: brando   Other Topics Concern    Not on file   Social History Narrative    Not on file     Social Determinants of Health     Financial Resource Strain: Not on file   Food Insecurity: Not on file   Transportation Needs: Not on file   Physical Activity: Not on file   Stress: Not on file   Social Connections: Not on file   Intimate Partner Violence: Not on file   Housing Stability: Not on file     Past Medical History:   Diagnosis Date    Abnormal Pap smear of cervix     Chlamydia     [facility]     Varicella      Family History   Problem Relation Age of Onset    Mental illness Mother     Depression Mother     Rheum arthritis Mother     Breast cancer Mother     Esophageal cancer Father     Mental illness Sister     Rheum arthritis Sister     Mental illness Sister     Rheum arthritis Sister     Breast cancer Maternal Grandmother     Colon cancer Neg Hx     Ovarian cancer Neg Hx     Cervical cancer Neg Hx     Uterine cancer Neg Hx      Past Surgical History:   Procedure Laterality Date    APPENDECTOMY      WISDOM TOOTH EXTRACTION         Current Outpatient Medications:     amoxicillin (AMOXIL) 500 mg capsule, Take 1 capsule (500 mg total) by mouth every 8 (eight) hours for 10 days, Disp: 30 capsule, Rfl: 0    cetirizine (ZyrTEC) 5 MG tablet, Take 5 mg by mouth daily, Disp: , Rfl:     clonazePAM (KlonoPIN) 0 5 MG disintegrating tablet, , Disp: , Rfl:     fluticasone (FLONASE) 50 mcg/act nasal spray, 1 spray into each nostril daily, Disp: 18 2 mL, Rfl: 0    hydroxychloroquine (PLAQUENIL) 200 mg tablet, Take 200 mg by mouth, Disp: , Rfl:     Melatonin 5 MG TABS, Take 5 mg by mouth Take 2 tablets by mouth at night as needed, Disp: , Rfl:     predniSONE 5 mg tablet, TAKE 2-3 TABLETS BY MOUTH EVERY DAY IN THE MORNING FOR 2 TO 3 DAYS AS NEEDED, Disp: , Rfl:     traZODone (DESYREL) 50 mg tablet, , Disp: , Rfl:     amphetamine-dextroamphetamine (ADDERALL XR) 10 MG 24 hr capsule, , Disp: , Rfl:     Brexpiprazole (REXULTI) 0 5 MG tablet, Take 0 5 mg by mouth daily Per pt taking 1mg daily, Disp: , Rfl:     EPINEPHrine (EPIPEN) 0 3 mg/0 3 mL SOAJ, Inject 0 3 mL (0 3 mg total) into a muscle once for 1 dose, Disp: 0 6 mL, Rfl: 0    FLUoxetine (PROzac) 20 mg capsule, , Disp: , Rfl:     FLUoxetine (PROzac) 40 MG capsule, , Disp: , Rfl:     Allergies   Allergen Reactions    Sulfa Antibiotics Rash          Lab Review   No visits with results within 2 Month(s) from this visit     Latest known visit with results is:   Appointment on 01/20/2022   Component Date Value    WBC 01/20/2022 6 64     RBC 01/20/2022 4 92     Hemoglobin 01/20/2022 15 1     Hematocrit 01/20/2022 44 2     MCV 01/20/2022 90     MCH 01/20/2022 30 7     MCHC 01/20/2022 34 2     RDW 01/20/2022 12 3     MPV 01/20/2022 9 7     Platelets 68/16/6615 287     nRBC 01/20/2022 0     Neutrophils Relative 01/20/2022 71     Immat GRANS % 01/20/2022 0     Lymphocytes Relative 01/20/2022 20     Monocytes Relative 01/20/2022 7     Eosinophils Relative 01/20/2022 1     Basophils Relative 01/20/2022 1     Neutrophils Absolute 01/20/2022 4 72     Immature Grans Absolute 01/20/2022 0 02     Lymphocytes Absolute 01/20/2022 1 35     Monocytes Absolute 01/20/2022 0 43     Eosinophils Absolute 01/20/2022 0 07     Basophils Absolute 01/20/2022 0 05     Sodium 01/20/2022 140     Potassium 01/20/2022 4 6     Chloride 01/20/2022 102     CO2 01/20/2022 29  ANION GAP 01/20/2022 9     BUN 01/20/2022 9     Creatinine 01/20/2022 0 98     Glucose, Fasting 01/20/2022 86     Calcium 01/20/2022 9 5     AST 01/20/2022 20     ALT 01/20/2022 19     Alkaline Phosphatase 01/20/2022 58     Total Protein 01/20/2022 8 1     Albumin 01/20/2022 4 7     Total Bilirubin 01/20/2022 0 64     eGFR 01/20/2022 77     Cholesterol 01/20/2022 200     Triglycerides 01/20/2022 56     HDL, Direct 01/20/2022 74     LDL Calculated 01/20/2022 115 (A)    Non-HDL-Chol (CHOL-HDL) 01/20/2022 126     TSH 3RD GENERATON 01/20/2022 2 363         Imaging: No results found  Objective:     Physical Exam  Constitutional:       General: She is not in acute distress  Appearance: She is well-developed  She is not ill-appearing or toxic-appearing  HENT:      Head: Normocephalic and atraumatic  Right Ear: Hearing normal  A middle ear effusion is present  Tympanic membrane is erythematous  Left Ear: Hearing normal  A middle ear effusion is present  Tympanic membrane is erythematous  Cardiovascular:      Rate and Rhythm: Normal rate and regular rhythm  Heart sounds: Normal heart sounds  Pulmonary:      Effort: Pulmonary effort is normal       Breath sounds: Normal breath sounds  Musculoskeletal:         General: Normal range of motion  Cervical back: Normal range of motion and neck supple  Skin:     General: Skin is warm and dry  Neurological:      Mental Status: She is alert and oriented to person, place, and time  Deep Tendon Reflexes: Reflexes are normal and symmetric  Psychiatric:         Behavior: Behavior normal          Thought Content: Thought content normal          Judgment: Judgment normal            There are no Patient Instructions on file for this visit  SEFERINO Anderson    Portions of the record may have been created with voice recognition software    Occasional wrong word or "sound a like" substitutions may have occurred due to the inherent limitations of voice recognition software  Read the chart carefully and recognize, using context, where substitutions have occurred

## 2022-09-30 ENCOUNTER — PROCEDURE VISIT (OUTPATIENT)
Dept: OBGYN CLINIC | Facility: CLINIC | Age: 32
End: 2022-09-30
Payer: COMMERCIAL

## 2022-09-30 ENCOUNTER — CLINICAL SUPPORT (OUTPATIENT)
Dept: FAMILY MEDICINE CLINIC | Facility: CLINIC | Age: 32
End: 2022-09-30
Payer: COMMERCIAL

## 2022-09-30 VITALS
SYSTOLIC BLOOD PRESSURE: 124 MMHG | BODY MASS INDEX: 33.66 KG/M2 | WEIGHT: 190 LBS | DIASTOLIC BLOOD PRESSURE: 74 MMHG | HEIGHT: 63 IN

## 2022-09-30 DIAGNOSIS — Z11.3 SCREENING FOR STDS (SEXUALLY TRANSMITTED DISEASES): ICD-10-CM

## 2022-09-30 DIAGNOSIS — Z30.432 ENCOUNTER FOR IUD REMOVAL: ICD-10-CM

## 2022-09-30 DIAGNOSIS — Z23 NEED FOR INFLUENZA VACCINATION: Primary | ICD-10-CM

## 2022-09-30 DIAGNOSIS — Z30.430 ENCOUNTER FOR INSERTION OF MIRENA IUD: Primary | ICD-10-CM

## 2022-09-30 PROBLEM — Z12.4 SCREENING FOR CERVICAL CANCER: Status: RESOLVED | Noted: 2020-02-07 | Resolved: 2022-09-30

## 2022-09-30 PROBLEM — Z01.419 ENCOUNTER FOR GYNECOLOGICAL EXAMINATION WITHOUT ABNORMAL FINDING: Status: RESOLVED | Noted: 2020-02-07 | Resolved: 2022-09-30

## 2022-09-30 PROBLEM — Z30.011 ENCOUNTER FOR INITIAL PRESCRIPTION OF CONTRACEPTIVE PILLS: Status: RESOLVED | Noted: 2020-02-07 | Resolved: 2022-09-30

## 2022-09-30 PROCEDURE — 87491 CHLMYD TRACH DNA AMP PROBE: CPT | Performed by: OBSTETRICS & GYNECOLOGY

## 2022-09-30 PROCEDURE — 87591 N.GONORRHOEAE DNA AMP PROB: CPT | Performed by: OBSTETRICS & GYNECOLOGY

## 2022-09-30 PROCEDURE — 58300 INSERT INTRAUTERINE DEVICE: CPT | Performed by: OBSTETRICS & GYNECOLOGY

## 2022-09-30 PROCEDURE — 90682 RIV4 VACC RECOMBINANT DNA IM: CPT

## 2022-09-30 PROCEDURE — 90471 IMMUNIZATION ADMIN: CPT

## 2022-09-30 PROCEDURE — 58301 REMOVE INTRAUTERINE DEVICE: CPT | Performed by: OBSTETRICS & GYNECOLOGY

## 2022-09-30 NOTE — PROGRESS NOTES
Iud removal    Date/Time: 9/30/2022 2:05 PM  Performed by: SEFERINO Caicedo  Authorized by: SEFERINO Caicedo   Cecil Protocol:  Procedure performed by:  Consent: Verbal consent obtained  Written consent not obtained  Risks and benefits: risks, benefits and alternatives were discussed  Consent given by: patient  Time out: Immediately prior to procedure a "time out" was called to verify the correct patient, procedure, equipment, support staff and site/side marked as required  Patient understanding: patient states understanding of the procedure being performed  Patient consent: the patient's understanding of the procedure matches consent given  Procedure consent: procedure consent matches procedure scheduled  Relevant documents: relevant documents present and verified  Site marked: the operative site was not marked  Radiology Images displayed and confirmed  If images not available, report reviewed: imaging studies not available  Patient identity confirmed: verbally with patient      Procedure:     Removed with no complications: yes      Removal due to mechanical complications of IUD: no      Removal due to infection and inflammatory reaction: no      Other reason for removal:  Pt requested change to Mirena  Comments:      IUD removed without difficulty, device intact, device observed by patient  Hemostasis appreciated post removal  Monitor for signs and symptoms of infection, return to the office with any concerns

## 2022-09-30 NOTE — PROGRESS NOTES
Iud insertions    Date/Time: 9/30/2022 2:06 PM  Performed by: Corinna Goodell, CRNP  Authorized by: Corinna Goodell, CRNP   Universal Protocol:  Procedure performed by:  Consent: Verbal consent obtained  Written consent not obtained  Risks and benefits: risks, benefits and alternatives were discussed  Consent given by: patient  Time out: Immediately prior to procedure a "time out" was called to verify the correct patient, procedure, equipment, support staff and site/side marked as required  Patient understanding: patient states understanding of the procedure being performed  Patient consent: the patient's understanding of the procedure matches consent given  Procedure consent: procedure consent matches procedure scheduled  Relevant documents: relevant documents present and verified  Test results: test results not available  Site marked: the operative site was not marked  Radiology Images displayed and confirmed  If images not available, report reviewed: imaging studies not available  Patient identity confirmed: verbally with patient        Procedure:     Pelvic exam performed: yes      Negative GC/chlamydia test: yes (sent )      Negative urine pregnancy test: yes      Negative serum pregnancy test: no      Cervix cleaned and prepped: yes      Speculum placed in vagina: yes      Tenaculum applied to cervix: yes      Uterus sounded: yes      Uterus sound depth (cm):  7 5    IUD inserted with no complications: yes      IUD type:  Mirena    Strings trimmed: yes    Post-procedure:     Patient tolerated procedure well: yes      Patient will follow up after next period: yes    Comments:      Procedure explained to patient, benefits, risks, side effects reviewed    Consent obtained  IUD placed without difficulty  Hemostasis appreciated post insertion  Patient advised nothing in the vagina for 1 week, advised to monitor for signs of infection such as abnormal discharge or odor, fevers or chills or pelvic pain  Return to the office for string check in 6-8 weeks

## 2022-09-30 NOTE — PATIENT INSTRUCTIONS
Intrauterine Device   AMBULATORY CARE:   An IUD  is a type of birth control that is inserted into your uterus  It is a small, flexible piece of plastic with a string on the end  It is inserted and removed by your healthcare provider  IUDs prevent sperm from reaching or fertilizing an egg  IUDs also prevent a fertilized egg from attaching to the uterus and developing into a fetus  Common types of IUDs:  Your healthcare provider will recommend the type of IUD that is right for you  This is based on your age and if you have had a child  If you have not had a child, a smaller IUD will be used  A copper IUD  slowly releases a small amount of copper into your uterus  This IUD can remain in place for up to 10 years  A hormone-releasing IUD  slowly releases a small amount of progesterone into your uterus  Progesterone is a hormone that is made by your body to help control your periods  This IUD can remain in place for 3 to 5 years  Seek care immediately if:   You have severe pain or bleeding during your period  You have a fever and severe abdominal pain  Call your doctor or gynecologist if:   You think you are pregnant  The IUD has come out  You have bleeding from your vagina after you have sex, and it is not your period  You have pain during sex  You cannot feel the IUD string, the string feels longer, or you feel the plastic of the IUD itself  You have vaginal discharge that is green, yellow, or has a foul odor  You have questions or concerns about your condition or care  Advantages of an IUD:   An IUD is 98% to 99% effective in preventing pregnancy  The IUD can be removed by your healthcare provider if you decide to have a baby  You may be able to get pregnant as soon as the IUD is removed  An IUD protects you from pregnancy right after it is inserted  You do not have to stop sexual activity to insert it   You do not have to remember to take your birth control pill     Copper IUDs are safer for some women than oral birth control pills  Examples include women who smoke or have a history of blood clots  Hormone-releasing IUDs may decrease certain health problems  Examples include bleeding and cramping that happen with your monthly period  Disadvantages of an IUD:   There is a small chance that you could get pregnant  Sometimes the IUD cannot be removed after you get pregnant  This increases your risk of a miscarriage or an ectopic pregnancy  Ectopic pregnancy is when the fertilized egg starts to grow somewhere other than your uterus  An IUD does not protect you from sexually transmitted infections  You may have cramps during the first weeks after you get the IUD  A copper IUD may cause your monthly period to be heavier or more painful  This is more common within the first 3 months after you get the IUD  You may need to have your IUD removed if your bleeding or pain becomes severe  You may have spotting between periods  There is a small risk of an infection within the first 20 days after the IUD is placed  Infection can lead to pelvic inflammatory disease  This can cause infertility  Your uterus may tear when the IUD is inserted  The IUD may slip part or all of the way out of your uterus  Self-care:   NSAIDs , such as ibuprofen, help decrease swelling, pain, and fever  This medicine is available with or without a doctor's order  NSAIDs can cause stomach bleeding or kidney problems in certain people  If you take blood thinner medicine, always ask if NSAIDs are safe for you  Always read the medicine label and follow directions  Apply heat to relieve pain and cramping  Use a heating pad set on low  Apply heat to your lower abdomen for 20 minutes every hour, or as directed  Return to activities as directed  Your healthcare provider will tell you when it is okay to return to work, school, or other activities      Do not use a tampon or have sex until your provider says it is okay  Make sure your IUD is in place: An IUD has a string that is made of plastic thread  One to 2 inches of this string hangs into your vagina  You cannot see this string, and it should not cause problems when you have sex  Check your IUD string every 3 days for the first 3 months that you have your IUD  After that, check the string after each monthly period  Do the following to check the placement of your IUD:  Wash your hands with soap and warm water  Dry them with a clean towel  Bend your knees and squat low to the ground  Gently put your index finger inside your vagina  The cervix is at the top of the vagina and feels like the tip of your nose  Feel for the IUD string  Do not pull on the string  You should not be able to feel the firm plastic of the IUD itself  Wash your hands after you check your IUD string  For more information:   [facility] [address] , [address]  Phone: 9- 535 [phone]  Web Address: https://[employer]/  org    Follow up with your doctor as directed:  Write down your questions so you remember to ask them during your visits  © Copyright [employer] 2022 Information is for End User's use only and may not be sold, redistributed or otherwise used for commercial purposes  All illustrations and images included in CareNotes® are the copyrighted property of A D A M , Inc  or [facility]   The above information is an  only  It is not intended as medical advice for individual conditions or treatments  Talk to your doctor, nurse or pharmacist before following any medical regimen to see if it is safe and effective for you

## 2022-10-01 LAB
C TRACH DNA SPEC QL NAA+PROBE: NEGATIVE
N GONORRHOEA DNA SPEC QL NAA+PROBE: NEGATIVE

## 2022-11-21 DIAGNOSIS — R09.82 PND (POST-NASAL DRIP): ICD-10-CM

## 2022-11-21 RX ORDER — FLUTICASONE PROPIONATE 50 MCG
1 SPRAY, SUSPENSION (ML) NASAL DAILY
Qty: 18.2 ML | Refills: 0 | Status: SHIPPED | OUTPATIENT
Start: 2022-11-21

## 2022-11-25 ENCOUNTER — OFFICE VISIT (OUTPATIENT)
Dept: OBGYN CLINIC | Facility: CLINIC | Age: 32
End: 2022-11-25

## 2022-11-25 ENCOUNTER — TELEPHONE (OUTPATIENT)
Dept: PSYCHIATRY | Facility: CLINIC | Age: 32
End: 2022-11-25

## 2022-11-25 VITALS
BODY MASS INDEX: 34.02 KG/M2 | WEIGHT: 192 LBS | DIASTOLIC BLOOD PRESSURE: 74 MMHG | HEIGHT: 63 IN | SYSTOLIC BLOOD PRESSURE: 120 MMHG

## 2022-11-25 DIAGNOSIS — Z30.431 IUD CHECK UP: Primary | ICD-10-CM

## 2022-11-25 NOTE — TELEPHONE ENCOUNTER
Contacted patient off of [facility] waitlist to verify needs of services in attempts to update list  Spoke w  Patient whom stated they are still interested in services but once writer notified her of our available locations pt stated all of the locations are far and she would have to pass   Pt removed from list

## 2022-11-25 NOTE — PATIENT INSTRUCTIONS
Intrauterine Device   AMBULATORY CARE:   An IUD  is a type of birth control that is inserted into your uterus  It is a small, flexible piece of plastic with a string on the end  It is inserted and removed by your healthcare provider  IUDs prevent sperm from reaching or fertilizing an egg  IUDs also prevent a fertilized egg from attaching to the uterus and developing into a fetus  Common types of IUDs:  Your healthcare provider will recommend the type of IUD that is right for you  This is based on your age and if you have had a child  If you have not had a child, a smaller IUD will be used  A copper IUD  slowly releases a small amount of copper into your uterus  This IUD can remain in place for up to 10 years  A hormone-releasing IUD  slowly releases a small amount of progesterone into your uterus  Progesterone is a hormone that is made by your body to help control your periods  This IUD can remain in place for 3 to 5 years  Seek care immediately if:   You have severe pain or bleeding during your period  You have a fever and severe abdominal pain  Call your doctor or gynecologist if:   You think you are pregnant  The IUD has come out  You have bleeding from your vagina after you have sex, and it is not your period  You have pain during sex  You cannot feel the IUD string, the string feels longer, or you feel the plastic of the IUD itself  You have vaginal discharge that is green, yellow, or has a foul odor  You have questions or concerns about your condition or care  Advantages of an IUD:   An IUD is 98% to 99% effective in preventing pregnancy  The IUD can be removed by your healthcare provider if you decide to have a baby  You may be able to get pregnant as soon as the IUD is removed  An IUD protects you from pregnancy right after it is inserted  You do not have to stop sexual activity to insert it   You do not have to remember to take your birth control pill     Copper IUDs are safer for some women than oral birth control pills  Examples include women who smoke or have a history of blood clots  Hormone-releasing IUDs may decrease certain health problems  Examples include bleeding and cramping that happen with your monthly period  Disadvantages of an IUD:   There is a small chance that you could get pregnant  Sometimes the IUD cannot be removed after you get pregnant  This increases your risk of a miscarriage or an ectopic pregnancy  Ectopic pregnancy is when the fertilized egg starts to grow somewhere other than your uterus  An IUD does not protect you from sexually transmitted infections  You may have cramps during the first weeks after you get the IUD  A copper IUD may cause your monthly period to be heavier or more painful  This is more common within the first 3 months after you get the IUD  You may need to have your IUD removed if your bleeding or pain becomes severe  You may have spotting between periods  There is a small risk of an infection within the first 20 days after the IUD is placed  Infection can lead to pelvic inflammatory disease  This can cause infertility  Your uterus may tear when the IUD is inserted  The IUD may slip part or all of the way out of your uterus  Self-care:   NSAIDs , such as ibuprofen, help decrease swelling, pain, and fever  This medicine is available with or without a doctor's order  NSAIDs can cause stomach bleeding or kidney problems in certain people  If you take blood thinner medicine, always ask if NSAIDs are safe for you  Always read the medicine label and follow directions  Apply heat to relieve pain and cramping  Use a heating pad set on low  Apply heat to your lower abdomen for 20 minutes every hour, or as directed  Return to activities as directed  Your healthcare provider will tell you when it is okay to return to work, school, or other activities      Do not use a tampon or have sex until your provider says it is okay  Make sure your IUD is in place: An IUD has a string that is made of plastic thread  One to 2 inches of this string hangs into your vagina  You cannot see this string, and it should not cause problems when you have sex  Check your IUD string every 3 days for the first 3 months that you have your IUD  After that, check the string after each monthly period  Do the following to check the placement of your IUD:  Wash your hands with soap and warm water  Dry them with a clean towel  Bend your knees and squat low to the ground  Gently put your index finger inside your vagina  The cervix is at the top of the vagina and feels like the tip of your nose  Feel for the IUD string  Do not pull on the string  You should not be able to feel the firm plastic of the IUD itself  Wash your hands after you check your IUD string  For more information:   [facility] [address] , [address]  Phone: 2- 927 - [phone]  Web Address: https://[employer]/  org    Follow up with your doctor as directed:  Write down your questions so you remember to ask them during your visits  © Copyright [employer] 2022 Information is for End User's use only and may not be sold, redistributed or otherwise used for commercial purposes  All illustrations and images included in CareNotes® are the copyrighted property of [employer]  or Ronny Lewis  The above information is an  only  It is not intended as medical advice for individual conditions or treatments  Talk to your doctor, nurse or pharmacist before following any medical regimen to see if it is safe and effective for you

## 2022-11-25 NOTE — PROGRESS NOTES
Assessment/Plan:    No problem-specific Assessment & Plan notes found for this encounter  Diagnoses and all orders for this visit:    IUD check up          Subjective:      Patient ID: Martha Garcia is a 33 y o  female  71-year-old female presents for IUD checkup  Inserted on 09/30/2022 reports no issues with bleeding, pelvic pain, unusual vaginal discharge or odor  The following portions of the patient's history were reviewed and updated as appropriate: allergies, current medications, past family history, past medical history, past social history, past surgical history and problem list     Review of Systems   Constitutional: Negative for chills, fatigue and fever  Eyes: Negative for visual disturbance  Respiratory: Negative for cough and shortness of breath  Cardiovascular: Negative for chest pain  Gastrointestinal: Negative for abdominal pain  Genitourinary: Negative for vaginal bleeding and vaginal discharge  Objective:      /74 (BP Location: Right arm, Patient Position: Sitting, Cuff Size: Large)   Ht 5' 3" (1 6 m)   Wt 87 1 kg (192 lb)   BMI 34 01 kg/m²          Physical Exam  Vitals and nursing note reviewed  Constitutional:       Appearance: Normal appearance  She is normal weight  HENT:      Head: Normocephalic and atraumatic  Eyes:      Conjunctiva/sclera: Conjunctivae normal    Cardiovascular:      Rate and Rhythm: Normal rate  Pulmonary:      Effort: Pulmonary effort is normal    Genitourinary:     General: Normal vulva  Exam position: Lithotomy position  Jarrell stage (genital): 5  Labia:         Right: No rash, tenderness, lesion or injury  Left: No rash, tenderness, lesion or injury  Vagina: Normal       Cervix: Normal       Uterus: Normal        Adnexa: Right adnexa normal and left adnexa normal       Comments: IUD strings present  Musculoskeletal:         General: Normal range of motion        Cervical back: Normal range of motion  Lymphadenopathy:      Lower Body: No right inguinal adenopathy  No left inguinal adenopathy  Skin:     General: Skin is warm and dry  Neurological:      Mental Status: She is alert  Psychiatric:         Mood and Affect: Mood normal          Behavior: Behavior normal          Thought Content:  Thought content normal          Judgment: Judgment normal

## 2023-01-12 ENCOUNTER — APPOINTMENT (OUTPATIENT)
Dept: LAB | Facility: HOSPITAL | Age: 33
End: 2023-01-12

## 2023-01-12 ENCOUNTER — OFFICE VISIT (OUTPATIENT)
Dept: FAMILY MEDICINE CLINIC | Facility: CLINIC | Age: 33
End: 2023-01-12

## 2023-01-12 VITALS
SYSTOLIC BLOOD PRESSURE: 110 MMHG | DIASTOLIC BLOOD PRESSURE: 72 MMHG | TEMPERATURE: 98.4 F | HEIGHT: 63 IN | BODY MASS INDEX: 34.12 KG/M2 | WEIGHT: 192.6 LBS | OXYGEN SATURATION: 98 % | HEART RATE: 78 BPM

## 2023-01-12 DIAGNOSIS — M06.09 RHEUMATOID ARTHRITIS OF MULTIPLE SITES WITH NEGATIVE RHEUMATOID FACTOR (HCC): ICD-10-CM

## 2023-01-12 LAB
ALBUMIN SERPL BCP-MCNC: 4.7 G/DL (ref 3.5–5)
ALP SERPL-CCNC: 68 U/L (ref 46–116)
ALT SERPL W P-5'-P-CCNC: 22 U/L (ref 12–78)
ANION GAP SERPL CALCULATED.3IONS-SCNC: 9 MMOL/L (ref 4–13)
AST SERPL W P-5'-P-CCNC: 19 U/L (ref 5–45)
BASOPHILS # BLD AUTO: 0.03 THOUSANDS/ÂΜL (ref 0–0.1)
BASOPHILS NFR BLD AUTO: 0 % (ref 0–1)
BILIRUB SERPL-MCNC: 0.63 MG/DL (ref 0.2–1)
BUN SERPL-MCNC: 15 MG/DL (ref 5–25)
CALCIUM SERPL-MCNC: 9.6 MG/DL (ref 8.3–10.1)
CHLORIDE SERPL-SCNC: 102 MMOL/L (ref 96–108)
CHOLEST SERPL-MCNC: 184 MG/DL
CO2 SERPL-SCNC: 28 MMOL/L (ref 21–32)
CREAT SERPL-MCNC: 0.93 MG/DL (ref 0.6–1.3)
EOSINOPHIL # BLD AUTO: 0.06 THOUSAND/ÂΜL (ref 0–0.61)
EOSINOPHIL NFR BLD AUTO: 1 % (ref 0–6)
ERYTHROCYTE [DISTWIDTH] IN BLOOD BY AUTOMATED COUNT: 12.1 % (ref 11.6–15.1)
EST. AVERAGE GLUCOSE BLD GHB EST-MCNC: 80 MG/DL
GFR SERPL CREATININE-BSD FRML MDRD: 82 ML/MIN/1.73SQ M
GLUCOSE P FAST SERPL-MCNC: 85 MG/DL (ref 65–99)
HBA1C MFR BLD: 4.4 %
HCT VFR BLD AUTO: 45.4 % (ref 34.8–46.1)
HDLC SERPL-MCNC: 75 MG/DL
HGB BLD-MCNC: 15.6 G/DL (ref 11.5–15.4)
IMM GRANULOCYTES # BLD AUTO: 0.02 THOUSAND/UL (ref 0–0.2)
IMM GRANULOCYTES NFR BLD AUTO: 0 % (ref 0–2)
INSULIN SERPL-ACNC: 8.2 MU/L (ref 3–25)
LDLC SERPL CALC-MCNC: 100 MG/DL (ref 0–100)
LYMPHOCYTES # BLD AUTO: 1.57 THOUSANDS/ÂΜL (ref 0.6–4.47)
LYMPHOCYTES NFR BLD AUTO: 22 % (ref 14–44)
MCH RBC QN AUTO: 30.8 PG (ref 26.8–34.3)
MCHC RBC AUTO-ENTMCNC: 34.4 G/DL (ref 31.4–37.4)
MCV RBC AUTO: 90 FL (ref 82–98)
MONOCYTES # BLD AUTO: 0.49 THOUSAND/ÂΜL (ref 0.17–1.22)
MONOCYTES NFR BLD AUTO: 7 % (ref 4–12)
NEUTROPHILS # BLD AUTO: 4.9 THOUSANDS/ÂΜL (ref 1.85–7.62)
NEUTS SEG NFR BLD AUTO: 70 % (ref 43–75)
NRBC BLD AUTO-RTO: 0 /100 WBCS
PLATELET # BLD AUTO: 185 THOUSANDS/UL (ref 149–390)
PMV BLD AUTO: 10.2 FL (ref 8.9–12.7)
POTASSIUM SERPL-SCNC: 5.1 MMOL/L (ref 3.5–5.3)
PROT SERPL-MCNC: 8.4 G/DL (ref 6.4–8.4)
RBC # BLD AUTO: 5.06 MILLION/UL (ref 3.81–5.12)
SODIUM SERPL-SCNC: 139 MMOL/L (ref 135–147)
T3FREE SERPL-MCNC: 2.79 PG/ML (ref 2.3–4.2)
T4 FREE SERPL-MCNC: 1.17 NG/DL (ref 0.76–1.46)
TRIGL SERPL-MCNC: 43 MG/DL
TSH SERPL DL<=0.05 MIU/L-ACNC: 2.33 UIU/ML (ref 0.45–4.5)
WBC # BLD AUTO: 7.07 THOUSAND/UL (ref 4.31–10.16)

## 2023-01-12 NOTE — PROGRESS NOTES
Assessment/Plan:    Rheumatoid arthritis of multiple sites with negative rheumatoid factor (HCC)  Stable  To continue current dose of Plaquenil  BMI 34 0-34 9,adult  To begin Rybelsus 3 mg  Counseled the importance of eating small frequent meals, staying well-hydrated, and continuing daily physical activity  Follow-up in 1 month or sooner if needed  Diagnoses and all orders for this visit:    BMI 34 0-34 9,adult  -     CBC and differential; Future  -     Comprehensive metabolic panel; Future  -     Lipid Panel with Direct LDL reflex; Future  -     TSH, 3rd generation; Future  -     T3, free; Future  -     T4, free; Future  -     HEMOGLOBIN A1C W/ EAG ESTIMATION; Future  -     Insulin, fasting; Future  -     semaglutide (Rybelsus) 3 MG tablet; Take 1 tablet (3 mg total) by mouth daily before breakfast    Rheumatoid arthritis of multiple sites with negative rheumatoid factor (HCC)    Other orders  -     Ferrous Sulfate (IRON PO); Take by mouth          Subjective:      Patient ID: Magdy Simon is a 33 y o  female  Dewabonnie Hernandezion presents for a follow-up  She has been having difficulty losing weight  She tries to stay adequately hydrated, make healthy food choices, and engage in daily physical activity  She has gained about 20 pounds in the last 2-1/2 years          The following portions of the patient's history were reviewed and updated as appropriate:   She   Patient Active Problem List    Diagnosis Date Noted   • ADHD 12/10/2021   • Annual physical exam 08/19/2021   • Anxiety 01/24/2020   • [facility] 01/24/2020   • Rheumatoid arthritis of multiple sites with negative rheumatoid factor ([facility] ) 01/24/2020   • BMI 34 0-34 9,adult 01/24/2020   • Major depressive disorder 08/03/2009     Current Outpatient Medications   Medication Sig Dispense Refill   • amphetamine-dextroamphetamine (ADDERALL XR) 10 MG 24 hr capsule      • cetirizine (ZyrTEC) 5 MG tablet Take 5 mg by mouth daily     • EPINEPHrine (EPIPEN) 0 3 mg/0 3 mL SOAJ Inject 0 3 mL (0 3 mg total) into a muscle once for 1 dose 0 6 mL 0   • Ferrous Sulfate (IRON PO) Take by mouth     • fluticasone (FLONASE) 50 mcg/act nasal spray 1 spray into each nostril daily 18 2 mL 0   • hydroxychloroquine (PLAQUENIL) 200 mg tablet Take 200 mg by mouth     • predniSONE 5 mg tablet TAKE 2-3 TABLETS BY MOUTH EVERY DAY IN THE MORNING FOR 2 TO 3 DAYS AS NEEDED     • semaglutide (Rybelsus) 3 MG tablet Take 1 tablet (3 mg total) by mouth daily before breakfast 30 tablet 1   • traZODone (DESYREL) 50 mg tablet        No current facility-administered medications for this visit  She is allergic to sulfa antibiotics       Review of Systems   Constitutional: Negative  Respiratory: Negative  Cardiovascular: Negative  Gastrointestinal: Negative  Neurological: Negative  Psychiatric/Behavioral: Negative  /72 (BP Location: Left arm, Patient Position: Sitting)   Pulse 78   Temp 98 4 °F (36 9 °C) (Tympanic)   Ht 5' 3" (1 6 m)   Wt 87 4 kg (192 lb 9 6 oz)   SpO2 98%   BMI 34 12 kg/m²     Objective:     Physical Exam  Vitals and nursing note reviewed  Constitutional:       General: She is not in acute distress  Appearance: Normal appearance  She is well-developed  She is not ill-appearing, toxic-appearing or diaphoretic  HENT:      Head: Normocephalic and atraumatic  Eyes:      Conjunctiva/sclera: Conjunctivae normal    Neck:      Thyroid: No thyromegaly or thyroid tenderness  Cardiovascular:      Rate and Rhythm: Normal rate and regular rhythm  Heart sounds: Normal heart sounds  No murmur heard  Pulmonary:      Effort: Pulmonary effort is normal  No respiratory distress  Breath sounds: Normal breath sounds  No wheezing or rales  Chest:      Chest wall: No tenderness  Musculoskeletal:      Cervical back: Neck supple  Lymphadenopathy:      Cervical: No cervical adenopathy  Neurological:      General: No focal deficit present  Mental Status: She is alert and oriented to person, place, and time  Psychiatric:         Behavior: Behavior normal          Thought Content:  Thought content normal          Judgment: Judgment normal

## 2023-01-12 NOTE — ASSESSMENT & PLAN NOTE
To begin Rybelsus 3 mg  Counseled the importance of eating small frequent meals, staying well-hydrated, and continuing daily physical activity  Follow-up in 1 month or sooner if needed

## 2023-01-23 DIAGNOSIS — R09.82 PND (POST-NASAL DRIP): ICD-10-CM

## 2023-01-23 RX ORDER — FLUTICASONE PROPIONATE 50 MCG
1 SPRAY, SUSPENSION (ML) NASAL DAILY
Qty: 18.2 ML | Refills: 0 | Status: SHIPPED | OUTPATIENT
Start: 2023-01-23

## 2023-02-24 ENCOUNTER — OFFICE VISIT (OUTPATIENT)
Dept: FAMILY MEDICINE CLINIC | Facility: CLINIC | Age: 33
End: 2023-02-24

## 2023-02-24 VITALS
HEIGHT: 63 IN | HEART RATE: 91 BPM | SYSTOLIC BLOOD PRESSURE: 118 MMHG | OXYGEN SATURATION: 98 % | TEMPERATURE: 98.8 F | WEIGHT: 190 LBS | BODY MASS INDEX: 33.66 KG/M2 | DIASTOLIC BLOOD PRESSURE: 80 MMHG

## 2023-02-24 PROBLEM — Z00.00 ANNUAL PHYSICAL EXAM: Status: RESOLVED | Noted: 2021-08-19 | Resolved: 2023-02-24

## 2023-02-24 RX ORDER — DEXTROAMPHETAMINE SACCHARATE, AMPHETAMINE ASPARTATE MONOHYDRATE, DEXTROAMPHETAMINE SULFATE AND AMPHETAMINE SULFATE 3.75; 3.75; 3.75; 3.75 MG/1; MG/1; MG/1; MG/1
15 CAPSULE, EXTENDED RELEASE ORAL DAILY
COMMUNITY
Start: 2023-02-09

## 2023-02-24 NOTE — PROGRESS NOTES
BMI Counseling: Body mass index is 33 66 kg/m²  The BMI is above normal  Nutrition recommendations include decreasing portion sizes, encouraging healthy choices of fruits and vegetables and moderation in carbohydrate intake  Pharmacotherapy was ordered to help aid in weight loss  Rationale for BMI follow-up plan is due to patient being overweight or obese  Assessment/Plan:     Chronic Problems:  BMI 33 0-33 9,adult  Renzo Das did lose 2 lbs in the past month  Will increase rybelsus to 7mg daily  Will f/u in 1 month  Visit Diagnosis:  Diagnoses and all orders for this visit:    BMI 34 0-34 9,adult  -     semaglutide (Rybelsus) 7 MG tablet; Take 1 tablet (7 mg total) by mouth daily before breakfast    BMI 33 0-33 9,adult    Other orders  -     amphetamine-dextroamphetamine (ADDERALL XR) 15 MG 24 hr capsule; Take 15 mg by mouth daily          Subjective:    Patient ID: Kristina Rawls is a 29 y o  female  Patient presents for follow up on weight  She was recently started on rybelsus 3mg daily  She did lose 2 lbs in the last month  She is tracking her calories on [employer], around 1800 calories daily  She does weightlift  Goal weight would potentially be around 150lbs  1st goal of 170lbs  The following portions of the patient's history were reviewed and updated as appropriate: allergies, current medications, past family history, past medical history, past social history, past surgical history and problem list     Review of Systems   Constitutional: Negative for chills and fever  HENT: Negative for ear pain and sore throat  Eyes: Negative for pain and visual disturbance  Respiratory: Negative for cough and shortness of breath  Cardiovascular: Negative for chest pain and palpitations  Gastrointestinal: Negative for abdominal pain and vomiting  Genitourinary: Negative for dysuria and hematuria  Musculoskeletal: Negative for arthralgias and back pain     Skin: Negative for color change and rash  Neurological: Negative for seizures and syncope  All other systems reviewed and are negative  /80 (BP Location: Left arm, Patient Position: Sitting)   Pulse 91   Temp 98 8 °F (37 1 °C) (Tympanic)   Ht 5' 3" (1 6 m)   Wt 86 2 kg (190 lb)   SpO2 98%   BMI 33 66 kg/m²   Social History     Socioeconomic History   • Marital status: /Civil Union     Spouse name: Not on file   • Number of children: Not on file   • Years of education: Not on file   • Highest education level: Not on file   Occupational History   • Not on file   Tobacco Use   • Smoking status: Never   • Smokeless tobacco: Never   Vaping Use   • Vaping Use: Never used   Substance and Sexual Activity   • Alcohol use: Yes   • Drug use: Yes     Types: Marijuana   • Sexual activity: Yes     Partners: Male     Birth control/protection: I U D       Comment: brando   Other Topics Concern   • Not on file   Social History Narrative   • Not on file     Social Determinants of Health     Financial Resource Strain: Not on file   Food Insecurity: Not on file   Transportation Needs: Not on file   Physical Activity: Not on file   Stress: Not on file   Social Connections: Not on file   Intimate Partner Violence: Not on file   Housing Stability: Not on file     Past Medical History:   Diagnosis Date   • Abnormal Pap smear of cervix    • Chlamydia    • Rheumatoid arthritis ([facility] [address] )    • Varicella      Family History   Problem Relation Age of Onset   • Mental illness Mother    • Depression Mother    • Rheum arthritis Mother    • Breast cancer Mother    • Esophageal cancer Father    • Mental illness Sister    • Rheum arthritis Sister    • Mental illness Sister    • Rheum arthritis Sister    • Breast cancer Maternal Grandmother    • Colon cancer Neg Hx    • Ovarian cancer Neg Hx    • Cervical cancer Neg Hx    • Uterine cancer Neg Hx      Past Surgical History:   Procedure Laterality Date   • APPENDECTOMY     • WISDOM TOOTH EXTRACTION Current Outpatient Medications:   •  amphetamine-dextroamphetamine (ADDERALL XR) 15 MG 24 hr capsule, Take 15 mg by mouth daily, Disp: , Rfl:   •  cetirizine (ZyrTEC) 5 MG tablet, Take 5 mg by mouth daily, Disp: , Rfl:   •  Ferrous Sulfate (IRON PO), Take by mouth, Disp: , Rfl:   •  fluticasone (FLONASE) 50 mcg/act nasal spray, 1 spray into each nostril daily, Disp: 18 2 mL, Rfl: 0  •  hydroxychloroquine (PLAQUENIL) 200 mg tablet, Take 200 mg by mouth, Disp: , Rfl:   •  predniSONE 5 mg tablet, TAKE 2-3 TABLETS BY MOUTH EVERY DAY IN THE MORNING FOR 2 TO 3 DAYS AS NEEDED, Disp: , Rfl:   •  semaglutide (Rybelsus) 7 MG tablet, Take 1 tablet (7 mg total) by mouth daily before breakfast, Disp: 30 tablet, Rfl: 1  •  traZODone (DESYREL) 50 mg tablet, , Disp: , Rfl:   •  EPINEPHrine (EPIPEN) 0 3 mg/0 3 mL SOAJ, Inject 0 3 mL (0 3 mg total) into a muscle once for 1 dose, Disp: 0 6 mL, Rfl: 0    Allergies   Allergen Reactions   • Sulfa Antibiotics Rash          Lab Review   Appointment on 01/12/2023   Component Date Value   • WBC 01/12/2023 7 07    • RBC 01/12/2023 5 06    • Hemoglobin 01/12/2023 15 6 (H)    • Hematocrit 01/12/2023 45 4    • MCV 01/12/2023 90    • MCH 01/12/2023 30 8    • MCHC 01/12/2023 34 4    • RDW 01/12/2023 12 1    • MPV 01/12/2023 10 2    • Platelets 75/04/9689 185    • nRBC 01/12/2023 0    • Neutrophils Relative 01/12/2023 70    • Immat GRANS % 01/12/2023 0    • Lymphocytes Relative 01/12/2023 22    • Monocytes Relative 01/12/2023 7    • Eosinophils Relative 01/12/2023 1    • Basophils Relative 01/12/2023 0    • Neutrophils Absolute 01/12/2023 4 90    • Immature Grans Absolute 01/12/2023 0 02    • Lymphocytes Absolute 01/12/2023 1 57    • Monocytes Absolute 01/12/2023 0 49    • Eosinophils Absolute 01/12/2023 0 06    • Basophils Absolute 01/12/2023 0 03    • Sodium 01/12/2023 139    • Potassium 01/12/2023 5 1    • Chloride 01/12/2023 102    • CO2 01/12/2023 28    • ANION GAP 01/12/2023 9    • BUN 01/12/2023 15    • Creatinine 01/12/2023 0 93    • Glucose, Fasting 01/12/2023 85    • Calcium 01/12/2023 9 6    • AST 01/12/2023 19    • ALT 01/12/2023 22    • Alkaline Phosphatase 01/12/2023 68    • Total Protein 01/12/2023 8 4    • Albumin 01/12/2023 4 7    • Total Bilirubin 01/12/2023 0 63    • eGFR 01/12/2023 82    • Cholesterol 01/12/2023 184    • Triglycerides 01/12/2023 43    • HDL, Direct 01/12/2023 75    • LDL Calculated 01/12/2023 100    • TSH 3RD GENERATON 01/12/2023 2 334    • T3, Free 01/12/2023 2 79    • Free T4 01/12/2023 1 17    • Hemoglobin A1C 01/12/2023 4 4    • EAG 01/12/2023 80    • Insulin, Fasting 01/12/2023 8 2         Imaging: No results found  Objective:     Physical Exam  Constitutional:       Appearance: She is well-developed  She is obese  Cardiovascular:      Rate and Rhythm: Normal rate and regular rhythm  Heart sounds: Normal heart sounds  No murmur heard  Pulmonary:      Effort: Pulmonary effort is normal  No respiratory distress  Breath sounds: Normal breath sounds  Skin:     General: Skin is warm and dry  Neurological:      Mental Status: She is alert and oriented to person, place, and time  There are no Patient Instructions on file for this visit  SEFERINO Mccarty    Portions of the record may have been created with voice recognition software  Occasional wrong word or "sound a like" substitutions may have occurred due to the inherent limitations of voice recognition software  Read the chart carefully and recognize, using context, where substitutions have occurred

## 2023-02-24 NOTE — ASSESSMENT & PLAN NOTE
Tiffanie Hay did lose 2 lbs in the past month  Will increase rybelsus to 7mg daily  Will f/u in 1 month

## 2023-03-17 ENCOUNTER — TELEPHONE (OUTPATIENT)
Dept: FAMILY MEDICINE CLINIC | Facility: CLINIC | Age: 33
End: 2023-03-17

## 2023-03-17 NOTE — TELEPHONE ENCOUNTER
Can you please recode the diagnosis for lab orders from 1/12/2023  They have the DX of BMI  We need to resubmit from [address] [place] [address]

## 2023-03-20 PROBLEM — E66.811 CLASS 1 OBESITY DUE TO EXCESS CALORIES IN ADULT: Status: ACTIVE | Noted: 2020-01-24

## 2023-03-20 PROBLEM — E66.09 CLASS 1 OBESITY DUE TO EXCESS CALORIES IN ADULT: Status: ACTIVE | Noted: 2020-01-24

## 2023-03-24 ENCOUNTER — OFFICE VISIT (OUTPATIENT)
Dept: FAMILY MEDICINE CLINIC | Facility: CLINIC | Age: 33
End: 2023-03-24

## 2023-03-24 VITALS
RESPIRATION RATE: 12 BRPM | SYSTOLIC BLOOD PRESSURE: 110 MMHG | TEMPERATURE: 98.1 F | HEART RATE: 88 BPM | HEIGHT: 63 IN | DIASTOLIC BLOOD PRESSURE: 80 MMHG | BODY MASS INDEX: 33.45 KG/M2 | OXYGEN SATURATION: 98 % | WEIGHT: 188.8 LBS

## 2023-03-24 DIAGNOSIS — F33.9 EPISODE OF RECURRENT MAJOR DEPRESSIVE DISORDER, UNSPECIFIED DEPRESSION EPISODE SEVERITY (HCC): ICD-10-CM

## 2023-03-24 DIAGNOSIS — F90.9 ATTENTION DEFICIT HYPERACTIVITY DISORDER (ADHD), UNSPECIFIED ADHD TYPE: ICD-10-CM

## 2023-03-24 DIAGNOSIS — Z00.00 ANNUAL PHYSICAL EXAM: Primary | ICD-10-CM

## 2023-03-24 DIAGNOSIS — R11.0 NAUSEA: ICD-10-CM

## 2023-03-24 RX ORDER — ONDANSETRON 4 MG/1
4 TABLET, FILM COATED ORAL EVERY 8 HOURS PRN
Qty: 30 TABLET | Refills: 0 | Status: SHIPPED | OUTPATIENT
Start: 2023-03-24

## 2023-03-24 NOTE — PROGRESS NOTES
[facility] [address] [facility]    NAME: Jacob Christine  AGE: 29 y o  SEX: female  : 1990     DATE: 3/24/2023     Assessment and Plan:     Problem List Items Addressed This Visit        Other    ADHD     Stable on current medication regimen  Major depressive disorder     Stable  Other Visit Diagnoses     Annual physical exam    -  Primary    BMI 34 0-34 9,adult        Great job on losing 2 pounds last month  Continue on current medication regiment  Continue to make healthy food choices and exercise  Relevant Medications    semaglutide (Rybelsus) 7 MG tablet    Nausea        Use Zofran every 8 hours as needed for nausea  Relevant Medications    ondansetron (ZOFRAN) 4 mg tablet          Immunizations and preventive care screenings were discussed with patient today  Appropriate education was printed on patient's after visit summary  Counseling:  Alcohol/drug use: discussed moderation in alcohol intake, the recommendations for healthy alcohol use, and avoidance of illicit drug use  Dental Health: discussed importance of regular tooth brushing, flossing, and dental visits  Injury prevention: discussed safety/seat belts, safety helmets, smoke detectors, carbon dioxide detectors, and smoking near bedding or upholstery  Sexual health: discussed sexually transmitted diseases, partner selection, use of condoms, avoidance of unintended pregnancy, and contraceptive alternatives  Exercise: the importance of regular exercise/physical activity was discussed  Recommend exercise 3-5 times per week for at least 30 minutes  Return in 4 weeks (on 2023) for Medication follow up w/pcp       Chief Complaint:     Chief Complaint   Patient presents with   • Follow-up     4 week       History of Present Illness:     Adult Annual Physical   Patient here for a comprehensive physical exam  The patient reports no problems  Been taking 7 mg of Rybelsus daily  Has been tolerating it well except for occasional nausea  Diet and Physical Activity  Diet/Nutrition: well balanced diet  Exercise: strength training exercises, 3-4 times a week on average and 30-60 minutes on average  Depression Screening  PHQ-2/9 Depression Screening         General Health  Sleep: sleeps poorly and gets 7-8 hours of sleep on average  Hearing: normal - bilateral   Vision: most recent eye exam >1 year ago and wears glasses  Dental: regular dental visits and brushes teeth twice daily  /GYN Health  Last menstrual period: IUD  Contraceptive method: IUD placement  History of STDs?: yes 2012     Review of Systems:     Review of Systems   Constitutional: Negative for chills and fever  Respiratory: Negative for cough and shortness of breath  Cardiovascular: Negative for chest pain  Gastrointestinal: Positive for nausea  Negative for abdominal pain and vomiting  Genitourinary: Negative for dysuria  Musculoskeletal: Negative for arthralgias and back pain  Neurological: Negative for headaches  All other systems reviewed and are negative  Past Medical History:     Past Medical History:   Diagnosis Date   • Abnormal Pap smear of cervix    • Chlamydia    • Rheumatoid arthritis ([facility] [address] )    • Varicella       Past Surgical History:     Past Surgical History:   Procedure Laterality Date   • APPENDECTOMY     • WISDOM TOOTH EXTRACTION        Social History:     Social History     Socioeconomic History   • Marital status: /Civil Union     Spouse name: None   • Number of children: None   • Years of education: None   • Highest education level: None   Occupational History   • None   Tobacco Use   • Smoking status: Never   • Smokeless tobacco: Never   Vaping Use   • Vaping Use: Never used   Substance and Sexual Activity   • Alcohol use:  Yes   • Drug use: Yes     Types: Marijuana   • Sexual activity: Yes     Partners: Male Birth control/protection: I U D  Comment: brando   Other Topics Concern   • None   Social History Narrative   • None     Social Determinants of Health     Financial Resource Strain: Not on file   Food Insecurity: Not on file   Transportation Needs: Not on file   Physical Activity: Not on file   Stress: Not on file   Social Connections: Not on file   Intimate Partner Violence: Not on file   Housing Stability: Not on file      Family History:     Family History   Problem Relation Age of Onset   • Mental illness Mother    • Depression Mother    • Rheum arthritis Mother    • Breast cancer Mother    • Esophageal cancer Father    • Mental illness Sister    • Rheum arthritis Sister    • Mental illness Sister    • Rheum arthritis Sister    • Breast cancer Maternal Grandmother    • Colon cancer Neg Hx    • Ovarian cancer Neg Hx    • Cervical cancer Neg Hx    • Uterine cancer Neg Hx       Current Medications:     Current Outpatient Medications   Medication Sig Dispense Refill   • amphetamine-dextroamphetamine (ADDERALL XR) 15 MG 24 hr capsule Take 15 mg by mouth daily     • cetirizine (ZyrTEC) 5 MG tablet Take 5 mg by mouth daily     • Ferrous Sulfate (IRON PO) Take by mouth     • fluticasone (FLONASE) 50 mcg/act nasal spray 1 spray into each nostril daily 18 2 mL 0   • hydroxychloroquine (PLAQUENIL) 200 mg tablet Take 200 mg by mouth     • ondansetron (ZOFRAN) 4 mg tablet Take 1 tablet (4 mg total) by mouth every 8 (eight) hours as needed for nausea or vomiting 30 tablet 0   • semaglutide (Rybelsus) 7 MG tablet Take 1 tablet (7 mg total) by mouth daily before breakfast 30 tablet 1   • traZODone (DESYREL) 50 mg tablet      • EPINEPHrine (EPIPEN) 0 3 mg/0 3 mL SOAJ Inject 0 3 mL (0 3 mg total) into a muscle once for 1 dose 0 6 mL 0   • predniSONE 5 mg tablet TAKE 2-3 TABLETS BY MOUTH EVERY DAY IN THE MORNING FOR 2 TO 3 DAYS AS NEEDED       No current facility-administered medications for this visit        Allergies: Allergies   Allergen Reactions   • Sulfa Antibiotics Rash      Physical Exam:     /80   Pulse 88   Temp 98 1 °F (36 7 °C)   Resp 12   Ht 5' 3" (1 6 m)   Wt 85 6 kg (188 lb 12 8 oz)   SpO2 98%   BMI 33 44 kg/m²     Physical Exam  Vitals and nursing note reviewed  Constitutional:       General: She is not in acute distress  Appearance: Normal appearance  She is well-developed  She is not ill-appearing  Cardiovascular:      Rate and Rhythm: Normal rate and regular rhythm  Pulses: Normal pulses  Heart sounds: Normal heart sounds  No murmur heard  Pulmonary:      Effort: Pulmonary effort is normal  No respiratory distress  Breath sounds: Normal breath sounds  Musculoskeletal:         General: No swelling or tenderness  Normal range of motion  Right lower leg: No edema  Left lower leg: No edema  Skin:     General: Skin is warm and dry  Capillary Refill: Capillary refill takes less than 2 seconds  Neurological:      Mental Status: She is alert and oriented to person, place, and time  Psychiatric:         Mood and Affect: Mood normal          Behavior: Behavior normal          Thought Content:  Thought content normal          Judgment: Judgment normal           Patricio Mane, Cheryl9 [facility] [address] [facility]

## 2023-03-24 NOTE — PATIENT INSTRUCTIONS
Wellness Visit for Adults   AMBULATORY CARE:   A wellness visit  is when you see your healthcare provider to get screened for health problems  Your healthcare provider will also give you advice on how to stay healthy  Write down your questions so you remember to ask them  Ask your healthcare provider how often you should have a wellness visit  What happens at a wellness visit:  Your healthcare provider will ask about your health, and your family history of health problems  This includes high blood pressure, heart disease, and cancer  He or she will ask if you have symptoms that concern you, if you smoke, and about your mood  You may also be asked about your intake of medicines, supplements, food, and alcohol  Any of the following may be done:  • Your weight  will be checked  Your height may also be checked so your body mass index (BMI) can be calculated  Your BMI shows if you are at a healthy weight  • Your blood pressure  and heart rate will be checked  Your temperature may also be checked  • Blood and urine tests  may be done  Blood tests may be done to check your cholesterol levels  Abnormal cholesterol levels increase your risk for heart disease and stroke  You may also need a blood or urine test to check for diabetes if you are at increased risk  Urine tests may be done to look for signs of an infection or kidney disease  • A physical exam  includes checking your heartbeat and lungs with a stethoscope  Your healthcare provider may also check your skin to look for sun damage  • Screening tests  may be recommended  A screening test is done to check for diseases that may not cause symptoms  The screening tests you may need depend on your age, gender, family history, and lifestyle habits  For example, colorectal screening may be recommended if you are 48years old or older  Screening tests you need if you are a woman:   • A Pap smear  is used to screen for cervical cancer   Pap smears are usually done every 3 to 5 years depending on your age  You may need them more often if you have had abnormal Pap smear test results in the past  Ask your healthcare provider how often you should have a Pap smear  • A mammogram  is an x-ray of your breasts to screen for breast cancer  Experts recommend mammograms every 2 years starting at age 48 years  You may need a mammogram at age 52 years or younger if you have an increased risk for breast cancer  Talk to your healthcare provider about when you should start having mammograms and how often you need them  Vaccines you may need:   • Get an influenza vaccine  every year  The influenza vaccine protects you from the flu  Several types of viruses cause the flu  The viruses change over time, so new vaccines are made each year  • Get a tetanus-diphtheria (Td) booster vaccine  every 10 years  This vaccine protects you against tetanus and diphtheria  Tetanus is a severe infection that may cause painful muscle spasms and lockjaw  Diphtheria is a severe bacterial infection that causes a thick covering in the back of your mouth and throat  • Get a human papillomavirus (HPV) vaccine  if you are female and aged 23 to 32 or male 23 to 24 and never received it  This vaccine protects you from HPV infection  HPV is the most common infection spread by sexual contact  HPV may also cause vaginal, penile, and anal cancers  • Get a pneumococcal vaccine  if you are aged 72 years or older  The pneumococcal vaccine is an injection given to protect you from pneumococcal disease  Pneumococcal disease is an infection caused by pneumococcal bacteria  The infection may cause pneumonia, meningitis, or an ear infection  • Get a shingles vaccine  if you are 60 or older, even if you have had shingles before  The shingles vaccine is an injection to protect you from the varicella-zoster virus  This is the same virus that causes chickenpox   Shingles is a painful rash that develops in people who had chickenpox or have been exposed to the virus  How to eat healthy:  My Plate is a model for planning healthy meals  It shows the types and amounts of foods that should go on your plate  Fruits and vegetables make up about half of your plate, and grains and protein make up the other half  A serving of dairy is included on the side of your plate  The amount of calories and serving sizes you need depends on your age, gender, weight, and height  Examples of healthy foods are listed below:  • Eat a variety of vegetables  such as dark green, red, and orange vegetables  You can also include canned vegetables low in sodium (salt) and frozen vegetables without added butter or sauces  • Eat a variety of fresh fruits , canned fruit in 100% juice, frozen fruit, and dried fruit  • Include whole grains  At least half of the grains you eat should be whole grains  Examples include whole-wheat bread, wheat pasta, brown rice, and whole-grain cereals such as oatmeal     • Eat a variety of protein foods such as seafood (fish and shellfish), lean meat, and poultry without skin (turkey and chicken)  Examples of lean meats include pork leg, shoulder, or tenderloin, and beef round, sirloin, tenderloin, and extra lean ground beef  Other protein foods include eggs and egg substitutes, beans, peas, soy products, nuts, and seeds  • Choose low-fat dairy products such as skim or 1% milk or low-fat yogurt, cheese, and cottage cheese  • Limit unhealthy fats  such as butter, hard margarine, and shortening  Exercise:  Exercise at least 30 minutes per day on most days of the week  Some examples of exercise include walking, biking, dancing, and swimming  You can also fit in more physical activity by taking the stairs instead of the elevator or parking farther away from stores  Include muscle strengthening activities 2 days each week  Regular exercise provides many health benefits   It helps you manage your weight, and decreases your risk for type 2 diabetes, heart disease, stroke, and high blood pressure  Exercise can also help improve your mood  Ask your healthcare provider about the best exercise plan for you  General health and safety guidelines:   • Do not smoke  Nicotine and other chemicals in cigarettes and cigars can cause lung damage  Ask your healthcare provider for information if you currently smoke and need help to quit  E-cigarettes or smokeless tobacco still contain nicotine  Talk to your healthcare provider before you use these products  • Limit alcohol  A drink of alcohol is 12 ounces of beer, 5 ounces of wine, or 1½ ounces of liquor  • Lose weight, if needed  Being overweight increases your risk of certain health conditions  These include heart disease, high blood pressure, type 2 diabetes, and certain types of cancer  • Protect your skin  Do not sunbathe or use tanning beds  Use sunscreen with a SPF 15 or higher  Apply sunscreen at least 15 minutes before you go outside  Reapply sunscreen every 2 hours  Wear protective clothing, hats, and sunglasses when you are outside  • Drive safely  Always wear your seatbelt  Make sure everyone in your car wears a seatbelt  A seatbelt can save your life if you are in an accident  Do not use your cell phone when you are driving  This could distract you and cause an accident  Pull over if you need to make a call or send a text message  • Practice safe sex  Use latex condoms if are sexually active and have more than one partner  Your healthcare provider may recommend screening tests for sexually transmitted infections (STIs)  • Wear helmets, lifejackets, and protective gear  Always wear a helmet when you ride a bike or motorcycle, go skiing, or play sports that could cause a head injury  Wear protective equipment when you play sports  Wear a lifejacket when you are on a boat or doing water sports      © Copyright [employer] 2022 Information is for End User's use only and may not be sold, redistributed or otherwise used for commercial purposes  The above information is an  only  It is not intended as medical advice for individual conditions or treatments  Talk to your doctor, nurse or pharmacist before following any medical regimen to see if it is safe and effective for you  Weight Management   AMBULATORY CARE:   Why it is important to manage your weight:  Being overweight increases your risk of health conditions such as heart disease, high blood pressure, type 2 diabetes, and certain types of cancer  It can also increase your risk for osteoarthritis, sleep apnea, and other respiratory problems  Aim for a slow, steady weight loss  Even a small amount of weight loss can lower your risk of health problems  Risks of being overweight:  Extra weight can cause many health problems, including the following:  • Diabetes (high blood sugar level)    • High blood pressure or high cholesterol    • Heart disease    • Stroke    • Gallbladder or liver disease    • Cancer of the colon, breast, prostate, liver, or kidney    • Sleep apnea    • Arthritis or gout    Screening  is done to check for health conditions before you have signs or symptoms  If you are 28to 79years old, your blood sugar level may be checked every 3 years for signs of prediabetes or diabetes  Your healthcare provider will check your blood pressure at each visit  High blood pressure can lead to a stroke or other problems  Your provider may check for signs of heart disease, cancer, or other health problems  How to lose weight safely:  A safe and healthy way to lose weight is to eat fewer calories and get regular exercise  • You can lose up about 1 pound a week by decreasing the number of calories you eat by 500 calories each day  You can decrease calories by eating smaller portion sizes or by cutting out high-calorie foods   Read labels to find out how many calories are in the foods you eat          • You can also burn calories with exercise such as walking, swimming, or biking  You will be more likely to keep weight off if you make these changes part of your lifestyle  Exercise at least 30 minutes per day on most days of the week  You can also fit in more physical activity by taking the stairs instead of the elevator or parking farther away from stores  Ask your healthcare provider about the best exercise plan for you  Healthy meal plan for weight management:  A healthy meal plan includes a variety of foods, contains fewer calories, and helps you stay healthy  A healthy meal plan includes the following:     • Eat whole-grain foods more often  A healthy meal plan should contain fiber  Fiber is the part of grains, fruits, and vegetables that is not broken down by your body  Whole-grain foods are healthy and provide extra fiber in your diet  Some examples of whole-grain foods are whole-wheat breads and pastas, oatmeal, brown rice, and bulgur  • Eat a variety of vegetables every day  Include dark, leafy greens such as spinach, kale, cameron greens, and mustard greens  Eat yellow and orange vegetables such as carrots, sweet potatoes, and winter squash  • Eat a variety of fruits every day  Choose fresh or canned fruit (canned in its own juice or light syrup) instead of juice  Fruit juice has very little or no fiber  • Eat low-fat dairy foods  Drink fat-free (skim) milk or 1% milk  Eat fat-free yogurt and low-fat cottage cheese  Try low-fat cheeses such as mozzarella and other reduced-fat cheeses  • Choose meat and other protein foods that are low in fat  Choose beans or other legumes such as split peas or lentils  Choose fish, skinless poultry (chicken or turkey), or lean cuts of red meat (beef or pork)  Before you cook meat or poultry, cut off any visible fat  • Use less fat and oil  Try baking foods instead of frying them   Add less fat, such as margarine, sour cream, regular salad dressing and mayonnaise to foods  Eat fewer high-fat foods  Some examples of high-fat foods include french fries, doughnuts, ice cream, and cakes  • Eat fewer sweets  Limit foods and drinks that are high in sugar  This includes candy, cookies, regular soda, and sweetened drinks  Ways to decrease calories:   • Eat smaller portions  ? Use a small plate with smaller servings  ? Do not eat second helpings  ? When you eat at a restaurant, ask for a box and place half of your meal in the box before you eat  ? Share an entrée with someone else  • Replace high-calorie snacks with healthy, low-calorie snacks  ? Choose fresh fruit, vegetables, fat-free rice cakes, or air-popped popcorn instead of potato chips, nuts, or chocolate  ? Choose water or calorie-free drinks instead of soda or sweetened drinks  • Do not shop for groceries when you are hungry  You may be more likely to make unhealthy food choices  Take a grocery list of healthy foods and shop after you have eaten  • Eat regular meals  Do not skip meals  Skipping meals can lead to overeating later in the day  This can make it harder for you to lose weight  Eat a healthy snack in place of a meal if you do not have time to eat a regular meal  Talk with a dietitian to help you create a meal plan and schedule that is right for you  Other things to consider as you try to lose weight:   • Be aware of situations that may give you the urge to overeat, such as eating while watching television  Find ways to avoid these situations  For example, read a book, go for a walk, or do crafts  • Meet with a weight loss support group or friends who are also trying to lose weight  This may help you stay motivated to continue working on your weight loss goals  © Copyright [employer] 2022 Information is for End User's use only and may not be sold, redistributed or otherwise used for commercial purposes    The above information is an  only  It is not intended as medical advice for individual conditions or treatments  Talk to your doctor, nurse or pharmacist before following any medical regimen to see if it is safe and effective for you

## 2023-05-01 DIAGNOSIS — R09.82 PND (POST-NASAL DRIP): ICD-10-CM

## 2023-05-01 RX ORDER — FLUTICASONE PROPIONATE 50 MCG
1 SPRAY, SUSPENSION (ML) NASAL DAILY
Qty: 18.2 ML | Refills: 0 | Status: SHIPPED | OUTPATIENT
Start: 2023-05-01

## 2023-06-09 ENCOUNTER — OFFICE VISIT (OUTPATIENT)
Dept: FAMILY MEDICINE CLINIC | Facility: CLINIC | Age: 33
End: 2023-06-09
Payer: COMMERCIAL

## 2023-06-09 VITALS
SYSTOLIC BLOOD PRESSURE: 110 MMHG | OXYGEN SATURATION: 99 % | HEART RATE: 72 BPM | BODY MASS INDEX: 31.01 KG/M2 | WEIGHT: 175 LBS | DIASTOLIC BLOOD PRESSURE: 80 MMHG | RESPIRATION RATE: 14 BRPM | HEIGHT: 63 IN | TEMPERATURE: 98.8 F

## 2023-06-09 DIAGNOSIS — E66.09 CLASS 1 OBESITY DUE TO EXCESS CALORIES WITHOUT SERIOUS COMORBIDITY WITH BODY MASS INDEX (BMI) OF 31.0 TO 31.9 IN ADULT: ICD-10-CM

## 2023-06-09 DIAGNOSIS — R09.82 PND (POST-NASAL DRIP): Primary | ICD-10-CM

## 2023-06-09 PROCEDURE — 99214 OFFICE O/P EST MOD 30 MIN: CPT | Performed by: NURSE PRACTITIONER

## 2023-06-09 RX ORDER — ESZOPICLONE 3 MG/1
TABLET, FILM COATED ORAL
COMMUNITY
Start: 2023-06-06

## 2023-06-09 NOTE — PROGRESS NOTES
Assessment/Plan:     Chronic Problems:  Obesity due to excess calories  Great job with 15 pound weight loss in the past 4 months  Continue Rybelsus daily  Advised to call with any issues with the medication  Follow-up in 3 months  Visit Diagnosis:  Diagnoses and all orders for this visit:    PND (post-nasal drip)  -     Ambulatory Referral to Otolaryngology; Future    Class 1 obesity due to excess calories without serious comorbidity with body mass index (BMI) of 31 0 to 31 9 in adult  -     semaglutide (Rybelsus) 7 MG tablet; Take 1 tablet (7 mg total) by mouth daily before breakfast    Other orders  -     eszopiclone (LUNESTA) 3 MG tablet          Subjective:    Patient ID: Alverto Hernandez is a 29 y o  female  Is for follow-up on weight loss  She is on Rybelsus and feels this is working well for her  She did lose a total of 15 pounds since starting the medication about 4 months ago  She is feeling better with the medication now, did have a period of nausea  She does have a chronic cough  She does have seasonal allergies and uses Flonase and Zyrtec regularly  The cough is mostly in the morning, clears throughout the day  The following portions of the patient's history were reviewed and updated as appropriate: allergies, current medications, past family history, past medical history, past social history, past surgical history and problem list     Review of Systems   Constitutional: Negative for chills and fever  HENT: Negative for congestion, ear pain and sore throat  Eyes: Negative for pain and visual disturbance  Respiratory: Positive for cough  Negative for shortness of breath  Cardiovascular: Negative for chest pain and palpitations  Gastrointestinal: Negative for abdominal pain, constipation, diarrhea, nausea and vomiting  Genitourinary: Negative for dysuria, frequency and hematuria  Musculoskeletal: Negative for arthralgias and back pain     Skin: Negative for color change "and rash  Allergic/Immunologic: Positive for environmental allergies  Neurological: Negative for seizures and syncope  Psychiatric/Behavioral: Negative for sleep disturbance  All other systems reviewed and are negative  /80   Pulse 72   Temp 98 8 °F (37 1 °C)   Resp 14   Ht 5' 3\" (1 6 m)   Wt 79 4 kg (175 lb)   SpO2 99%   BMI 31 00 kg/m²   Social History     Socioeconomic History   • Marital status: /Civil Union     Spouse name: Not on file   • Number of children: Not on file   • Years of education: Not on file   • Highest education level: Not on file   Occupational History   • Not on file   Tobacco Use   • Smoking status: Never   • Smokeless tobacco: Never   Vaping Use   • Vaping Use: Never used   Substance and Sexual Activity   • Alcohol use: Yes   • Drug use: Yes     Types: Marijuana   • Sexual activity: Yes     Partners: Male     Birth control/protection: I U D       Comment: brando   Other Topics Concern   • Not on file   Social History Narrative   • Not on file     Social Determinants of Health     Financial Resource Strain: Not on file   Food Insecurity: Not on file   Transportation Needs: Not on file   Physical Activity: Not on file   Stress: Not on file   Social Connections: Not on file   Intimate Partner Violence: Not on file   Housing Stability: Not on file     Past Medical History:   Diagnosis Date   • Abnormal Pap smear of cervix    • Chlamydia    • Rheumatoid arthritis ([facility] [address] )    • Varicella      Family History   Problem Relation Age of Onset   • Mental illness Mother    • Depression Mother    • Rheum arthritis Mother    • Breast cancer Mother    • Esophageal cancer Father    • Mental illness Sister    • Rheum arthritis Sister    • Mental illness Sister    • Rheum arthritis Sister    • Breast cancer Maternal Grandmother    • Colon cancer Neg Hx    • Ovarian cancer Neg Hx    • Cervical cancer Neg Hx    • Uterine cancer Neg Hx      Past Surgical History:   Procedure " Laterality Date   • APPENDECTOMY     • WISDOM TOOTH EXTRACTION         Current Outpatient Medications:   •  amphetamine-dextroamphetamine (ADDERALL XR) 15 MG 24 hr capsule, Take 15 mg by mouth daily, Disp: , Rfl:   •  cetirizine (ZyrTEC) 5 MG tablet, Take 5 mg by mouth daily, Disp: , Rfl:   •  eszopiclone (LUNESTA) 3 MG tablet, , Disp: , Rfl:   •  Ferrous Sulfate (IRON PO), Take by mouth, Disp: , Rfl:   •  fluticasone (FLONASE) 50 mcg/act nasal spray, 1 spray into each nostril daily, Disp: 18 2 mL, Rfl: 0  •  hydroxychloroquine (PLAQUENIL) 200 mg tablet, Take 200 mg by mouth, Disp: , Rfl:   •  ondansetron (ZOFRAN) 4 mg tablet, Take 1 tablet (4 mg total) by mouth every 8 (eight) hours as needed for nausea or vomiting, Disp: 30 tablet, Rfl: 0  •  semaglutide (Rybelsus) 7 MG tablet, Take 1 tablet (7 mg total) by mouth daily before breakfast, Disp: 90 tablet, Rfl: 1  •  EPINEPHrine (EPIPEN) 0 3 mg/0 3 mL SOAJ, Inject 0 3 mL (0 3 mg total) into a muscle once for 1 dose, Disp: 0 6 mL, Rfl: 0  •  predniSONE 5 mg tablet, TAKE 2-3 TABLETS BY MOUTH EVERY DAY IN THE MORNING FOR 2 TO 3 DAYS AS NEEDED, Disp: , Rfl:     Allergies   Allergen Reactions   • Sulfa Antibiotics Rash          Lab Review   No visits with results within 2 Month(s) from this visit     Latest known visit with results is:   Appointment on 01/12/2023   Component Date Value   • WBC 01/12/2023 7 07    • RBC 01/12/2023 5 06    • Hemoglobin 01/12/2023 15 6 (H)    • Hematocrit 01/12/2023 45 4    • MCV 01/12/2023 90    • MCH 01/12/2023 30 8    • MCHC 01/12/2023 34 4    • RDW 01/12/2023 12 1    • MPV 01/12/2023 10 2    • Platelets 33/55/2049 185    • nRBC 01/12/2023 0    • Neutrophils Relative 01/12/2023 70    • Immat GRANS % 01/12/2023 0    • Lymphocytes Relative 01/12/2023 22    • Monocytes Relative 01/12/2023 7    • Eosinophils Relative 01/12/2023 1    • Basophils Relative 01/12/2023 0    • Neutrophils Absolute 01/12/2023 4 90    • Immature Grans Absolute "01/12/2023 0 02    • Lymphocytes Absolute 01/12/2023 1 57    • Monocytes Absolute 01/12/2023 0 49    • Eosinophils Absolute 01/12/2023 0 06    • Basophils Absolute 01/12/2023 0 03    • Sodium 01/12/2023 139    • Potassium 01/12/2023 5 1    • Chloride 01/12/2023 102    • CO2 01/12/2023 28    • ANION GAP 01/12/2023 9    • BUN 01/12/2023 15    • Creatinine 01/12/2023 0 93    • Glucose, Fasting 01/12/2023 85    • Calcium 01/12/2023 9 6    • AST 01/12/2023 19    • ALT 01/12/2023 22    • Alkaline Phosphatase 01/12/2023 68    • Total Protein 01/12/2023 8 4    • Albumin 01/12/2023 4 7    • Total Bilirubin 01/12/2023 0 63    • eGFR 01/12/2023 82    • Cholesterol 01/12/2023 184    • Triglycerides 01/12/2023 43    • HDL, Direct 01/12/2023 75    • LDL Calculated 01/12/2023 100    • TSH 3RD GENERATON 01/12/2023 2 334    • T3, Free 01/12/2023 2 79    • Free T4 01/12/2023 1 17    • Hemoglobin A1C 01/12/2023 4 4    • EAG 01/12/2023 80    • Insulin, Fasting 01/12/2023 8 2         Imaging: No results found  Objective:     Physical Exam  Constitutional:       Appearance: She is well-developed  Cardiovascular:      Rate and Rhythm: Normal rate and regular rhythm  Heart sounds: Normal heart sounds  No murmur heard  Pulmonary:      Effort: Pulmonary effort is normal  No respiratory distress  Breath sounds: Normal breath sounds  Skin:     General: Skin is warm and dry  Neurological:      Mental Status: She is alert and oriented to person, place, and time  There are no Patient Instructions on file for this visit  SEFERINO Mccarty    Portions of the record may have been created with voice recognition software  Occasional wrong word or \"sound a like\" substitutions may have occurred due to the inherent limitations of voice recognition software  Read the chart carefully and recognize, using context, where substitutions have occurred    "

## 2023-06-09 NOTE — ASSESSMENT & PLAN NOTE
Great job with 15 pound weight loss in the past 4 months  Continue Rybelsus daily  Advised to call with any issues with the medication  Follow-up in 3 months

## 2023-09-15 ENCOUNTER — TELEPHONE (OUTPATIENT)
Dept: FAMILY MEDICINE CLINIC | Facility: CLINIC | Age: 33
End: 2023-09-15

## 2023-09-15 ENCOUNTER — SOCIAL WORK (OUTPATIENT)
Dept: BEHAVIORAL/MENTAL HEALTH CLINIC | Facility: CLINIC | Age: 33
End: 2023-09-15
Payer: COMMERCIAL

## 2023-09-15 DIAGNOSIS — F41.9 ANXIETY: ICD-10-CM

## 2023-09-15 DIAGNOSIS — F33.9 EPISODE OF RECURRENT MAJOR DEPRESSIVE DISORDER, UNSPECIFIED DEPRESSION EPISODE SEVERITY (HCC): Primary | ICD-10-CM

## 2023-09-15 DIAGNOSIS — F90.9 ATTENTION DEFICIT HYPERACTIVITY DISORDER (ADHD), UNSPECIFIED ADHD TYPE: ICD-10-CM

## 2023-09-15 PROCEDURE — 90834 PSYTX W PT 45 MINUTES: CPT | Performed by: SOCIAL WORKER

## 2023-09-19 NOTE — PSYCH
Behavioral Health Psychotherapy Progress Note    Psychotherapy Provided: Individual Psychotherapy     1. Episode of recurrent major depressive disorder, unspecified depression episode severity ([address])        2. Anxiety        3. Attention deficit hyperactivity disorder (ADHD), unspecified ADHD type            DATA: Therapist met w/pt for individual session. Pt stated that she has been feeling "okay" emotionally. She expressed having a lot of issues recently with her sexuality and not knowing how to navigate it. Therapist helped pt begin to process her feelings and work on identifying a way she can move forward especially w/her . Pt stated that this has created a lot of stress/anxiety for her but also some relief due to realizing that she isn't heterosexual.     During this session, this clinician used the following therapeutic modalities: Cognitive Behavioral Therapy, Motivational Interviewing and Solution-Focused Therapy    Substance Abuse was not addressed during this session. If the client is diagnosed with a co-occurring substance use disorder, please indicate any changes in the frequency or amount of use: unknown. Stage of change for addressing substance use diagnoses: No substance use/Not applicable    ASSESSMENT:  Mason Newman presents with a Anxious mood. her affect is Normal range and intensity, which is congruent, with her mood and the content of the session. The client has made progress on their goals. Mason Newman presents with a minimal risk of suicide, minimal risk of self-harm, and none risk of harm to others. For any risk assessment that surpasses a "low" rating, a safety plan must be developed. A safety plan was indicated: none  If yes, describe in detail n/a    PLAN: Between sessions, Mason Newman will think about whether or not she wants to do individual or couples counseling.     Behavioral Health Treatment Plan and Discharge Planning: Mason Newman is aware of and agrees to continue to work on their treatment plan. They have identified and are working toward their discharge goals.      Visit start and stop times:    09/15/23  Start Time: 1300  Stop Time: 1345  Total Visit Time: 45 minutes

## 2023-09-20 ENCOUNTER — OFFICE VISIT (OUTPATIENT)
Dept: FAMILY MEDICINE CLINIC | Facility: CLINIC | Age: 33
End: 2023-09-20
Payer: COMMERCIAL

## 2023-09-20 VITALS
OXYGEN SATURATION: 99 % | WEIGHT: 169.8 LBS | BODY MASS INDEX: 30.09 KG/M2 | SYSTOLIC BLOOD PRESSURE: 112 MMHG | HEIGHT: 63 IN | TEMPERATURE: 97.6 F | DIASTOLIC BLOOD PRESSURE: 80 MMHG | HEART RATE: 74 BPM

## 2023-09-20 DIAGNOSIS — E66.09 CLASS 1 OBESITY DUE TO EXCESS CALORIES WITHOUT SERIOUS COMORBIDITY WITH BODY MASS INDEX (BMI) OF 31.0 TO 31.9 IN ADULT: Primary | ICD-10-CM

## 2023-09-20 DIAGNOSIS — R09.82 PND (POST-NASAL DRIP): ICD-10-CM

## 2023-09-20 DIAGNOSIS — F33.41 RECURRENT MAJOR DEPRESSIVE DISORDER, IN PARTIAL REMISSION (HCC): ICD-10-CM

## 2023-09-20 PROBLEM — M05.9 SEROPOSITIVE RHEUMATOID ARTHRITIS (HCC): Status: ACTIVE | Noted: 2020-01-24

## 2023-09-20 PROBLEM — E55.9 VITAMIN D DEFICIENCY: Status: ACTIVE | Noted: 2023-08-25

## 2023-09-20 PROCEDURE — 99214 OFFICE O/P EST MOD 30 MIN: CPT | Performed by: NURSE PRACTITIONER

## 2023-09-20 RX ORDER — FLUTICASONE PROPIONATE 50 MCG
1 SPRAY, SUSPENSION (ML) NASAL DAILY
Qty: 18.2 ML | Refills: 0 | Status: SHIPPED | OUTPATIENT
Start: 2023-09-20

## 2023-09-20 RX ORDER — TOPIRAMATE 25 MG/1
25 TABLET ORAL
COMMUNITY
Start: 2023-09-13

## 2023-09-20 NOTE — ASSESSMENT & PLAN NOTE
Patient has had 19 pound weight loss in the past 6 months. Notes her weight has plateaued over the last month. At this time, we will increase semaglutide from 7 mg to 14 mg. Advised to monitor for GI symptoms. To continue with diet and lifestyle modifications.   To follow-up with PCP in 4 weeks

## 2023-09-20 NOTE — ASSESSMENT & PLAN NOTE
Stable on current medications. Denies SI/HI, or worsening of symptoms. Continue follow-up with psychiatry as scheduled.

## 2023-09-20 NOTE — PATIENT INSTRUCTIONS
Weight Management   AMBULATORY CARE:   Why it is important to manage your weight:  Being overweight increases your risk of health conditions such as heart disease, high blood pressure, type 2 diabetes, and certain types of cancer. It can also increase your risk for osteoarthritis, sleep apnea, and other respiratory problems. Aim for a slow, steady weight loss. Even a small amount of weight loss can lower your risk of health problems. Risks of being overweight:  Extra weight can cause many health problems, including the following:  Diabetes (high blood sugar level)    High blood pressure or high cholesterol    Heart disease    Stroke    Gallbladder or liver disease    Cancer of the colon, breast, prostate, liver, or kidney    Sleep apnea    Arthritis or gout    Screening  is done to check for health conditions before you have signs or symptoms. If you are 28to 79years old, your blood sugar level may be checked every 3 years for signs of prediabetes or diabetes. Your healthcare provider will check your blood pressure at each visit. High blood pressure can lead to a stroke or other problems. Your provider may check for signs of heart disease, cancer, or other health problems. How to lose weight safely:  A safe and healthy way to lose weight is to eat fewer calories and get regular exercise. You can lose up about 1 pound a week by decreasing the number of calories you eat by 500 calories each day. You can decrease calories by eating smaller portion sizes or by cutting out high-calorie foods. Read labels to find out how many calories are in the foods you eat. You can also burn calories with exercise such as walking, swimming, or biking. You will be more likely to keep weight off if you make these changes part of your lifestyle. Exercise at least 30 minutes per day on most days of the week.  You can also fit in more physical activity by taking the stairs instead of the elevator or parking farther away from stores. Ask your healthcare provider about the best exercise plan for you. Healthy meal plan for weight management:  A healthy meal plan includes a variety of foods, contains fewer calories, and helps you stay healthy. A healthy meal plan includes the following:     Eat whole-grain foods more often. A healthy meal plan should contain fiber. Fiber is the part of grains, fruits, and vegetables that is not broken down by your body. Whole-grain foods are healthy and provide extra fiber in your diet. Some examples of whole-grain foods are whole-wheat breads and pastas, oatmeal, brown rice, and bulgur. Eat a variety of vegetables every day. Include dark, leafy greens such as spinach, kale, cameron greens, and mustard greens. Eat yellow and orange vegetables such as carrots, sweet potatoes, and winter squash. Eat a variety of fruits every day. Choose fresh or canned fruit (canned in its own juice or light syrup) instead of juice. Fruit juice has very little or no fiber. Eat low-fat dairy foods. Drink fat-free (skim) milk or 1% milk. Eat fat-free yogurt and low-fat cottage cheese. Try low-fat cheeses such as mozzarella and other reduced-fat cheeses. Choose meat and other protein foods that are low in fat. Choose beans or other legumes such as split peas or lentils. Choose fish, skinless poultry (chicken or turkey), or lean cuts of red meat (beef or pork). Before you cook meat or poultry, cut off any visible fat. Use less fat and oil. Try baking foods instead of frying them. Add less fat, such as margarine, sour cream, regular salad dressing and mayonnaise to foods. Eat fewer high-fat foods. Some examples of high-fat foods include french fries, doughnuts, ice cream, and cakes. Eat fewer sweets. Limit foods and drinks that are high in sugar. This includes candy, cookies, regular soda, and sweetened drinks. Ways to decrease calories:   Eat smaller portions.      Use a small plate with smaller servings. Do not eat second helpings. When you eat at a restaurant, ask for a box and place half of your meal in the box before you eat. Share an entrée with someone else. Replace high-calorie snacks with healthy, low-calorie snacks. Choose fresh fruit, vegetables, fat-free rice cakes, or air-popped popcorn instead of potato chips, nuts, or chocolate. Choose water or calorie-free drinks instead of soda or sweetened drinks. Do not shop for groceries when you are hungry. You may be more likely to make unhealthy food choices. Take a grocery list of healthy foods and shop after you have eaten. Eat regular meals. Do not skip meals. Skipping meals can lead to overeating later in the day. This can make it harder for you to lose weight. Eat a healthy snack in place of a meal if you do not have time to eat a regular meal. Talk with a dietitian to help you create a meal plan and schedule that is right for you. Other things to consider as you try to lose weight:   Be aware of situations that may give you the urge to overeat, such as eating while watching television. Find ways to avoid these situations. For example, read a book, go for a walk, or do crafts. Meet with a weight loss support group or friends who are also trying to lose weight. This may help you stay motivated to continue working on your weight loss goals. © Copyright Romelia Klein 2023 Information is for End User's use only and may not be sold, redistributed or otherwise used for commercial purposes. The above information is an  only. It is not intended as medical advice for individual conditions or treatments. Talk to your doctor, nurse or pharmacist before following any medical regimen to see if it is safe and effective for you.

## 2023-09-20 NOTE — PROGRESS NOTES
Name: Vani Glass      : 1990      MRN: [id number]  Encounter Provider: SEFERINO Gonzales  Encounter Date: 2023   Encounter department: [address] [place] [address]. Class 1 obesity due to excess calories without serious comorbidity with body mass index (BMI) of 31.0 to 31.9 in adult  Assessment & Plan:  Patient has had 19 pound weight loss in the past 6 months. Notes her weight has plateaued over the last month. At this time, we will increase semaglutide from 7 mg to 14 mg. Advised to monitor for GI symptoms. To continue with diet and lifestyle modifications. To follow-up with PCP in 4 weeks    Orders:  -     semaglutide (Rybelsus) 14 MG tablet; Take 1 tablet (14 mg total) by mouth daily before breakfast    2. PND (post-nasal drip)  Comments:   Flonase, OTC  antihistamine  Orders:  -     fluticasone (FLONASE) 50 mcg/act nasal spray; 1 spray into each nostril daily    3. Recurrent major depressive disorder, in partial remission ([address])  Assessment & Plan:  Stable on current medications. Denies SI/HI, or worsening of symptoms. Continue follow-up with psychiatry as scheduled. Subjective      Patient presents to the office for 3-month check-up. As per patient, was initiated on semaglutide earlier this year. Patient's goal weight is to be 145 to 150 pounds. Patient currently denies adverse effects from medication. Denies nausea or other GI side effects. Patient denies swelling in throat, change in voice, difficulty swallowing. As per patient, does not do additional exercise, but states she walks at work. Patient also increasing her protein intake and decreasing her carbohydrate intake. Review of Systems   Constitutional: Negative for diaphoresis, fatigue and unexpected weight change. HENT: Negative for sore throat, trouble swallowing and voice change. Eyes: Negative for photophobia and visual disturbance. Respiratory: Negative for cough and shortness of breath. Cardiovascular: Negative for chest pain and palpitations. Gastrointestinal: Negative for abdominal pain, nausea and vomiting. Genitourinary: Negative for decreased urine volume. Musculoskeletal: Negative for arthralgias and myalgias. Skin: Negative for color change and rash. Neurological: Negative for dizziness, light-headedness and headaches. Psychiatric/Behavioral: Negative for confusion and dysphoric mood. The patient is not nervous/anxious.         Current Outpatient Medications on File Prior to Visit   Medication Sig   • cetirizine (ZyrTEC) 5 MG tablet Take 5 mg by mouth daily   • EPINEPHrine (EPIPEN) 0.3 mg/0.3 mL SOAJ Inject 0.3 mL (0.3 mg total) into a muscle once for 1 dose   • eszopiclone (LUNESTA) 3 MG tablet    • Ferrous Sulfate (IRON PO) Take by mouth   • hydroxychloroquine (PLAQUENIL) 200 mg tablet Take 200 mg by mouth   • ipratropium (ATROVENT) 0.03 % nasal spray 2 sprays into each nostril 3 (three) times a day   • predniSONE 5 mg tablet TAKE 2-3 TABLETS BY MOUTH EVERY DAY IN THE MORNING FOR 2 TO 3 DAYS AS NEEDED   • topiramate (TOPAMAX) 25 mg tablet Take 25 mg by mouth daily at bedtime   • Vyvanse 40 MG capsule    • [DISCONTINUED] fluticasone (FLONASE) 50 mcg/act nasal spray 1 spray into each nostril daily   • [DISCONTINUED] semaglutide (Rybelsus) 7 MG tablet Take 1 tablet (7 mg total) by mouth daily before breakfast   • [DISCONTINUED] amphetamine-dextroamphetamine (ADDERALL XR) 15 MG 24 hr capsule Take 15 mg by mouth daily (Patient not taking: Reported on 8/18/2023)   • [DISCONTINUED] ondansetron (ZOFRAN) 4 mg tablet Take 1 tablet (4 mg total) by mouth every 8 (eight) hours as needed for nausea or vomiting       Objective     /80 (BP Location: Left arm, Patient Position: Sitting, Cuff Size: Standard)   Pulse 74   Temp 97.6 °F (36.4 °C) (Tympanic)   Ht 5' 3" (1.6 m)   Wt 77 kg (169 lb 12.8 oz)   SpO2 99%   BMI 30.08 kg/m²     Physical Exam  Vitals reviewed. Constitutional:       General: She is not in acute distress. Appearance: Normal appearance. She is not ill-appearing. HENT:      Head: Normocephalic and atraumatic. Right Ear: Tympanic membrane, ear canal and external ear normal.      Left Ear: Tympanic membrane, ear canal and external ear normal.      Nose: Nose normal.      Mouth/Throat:      Mouth: Mucous membranes are moist.      Pharynx: Oropharynx is clear. Eyes:      Conjunctiva/sclera: Conjunctivae normal.      Pupils: Pupils are equal, round, and reactive to light. Cardiovascular:      Rate and Rhythm: Normal rate and regular rhythm. Pulses: Normal pulses. Heart sounds: Normal heart sounds. No murmur heard. Pulmonary:      Effort: Pulmonary effort is normal.      Breath sounds: Normal breath sounds. Abdominal:      General: Bowel sounds are normal.      Palpations: Abdomen is soft. Tenderness: There is no abdominal tenderness. Musculoskeletal:         General: Normal range of motion. Cervical back: Normal range of motion and neck supple. Right lower leg: No edema. Left lower leg: No edema. Lymphadenopathy:      Cervical: No cervical adenopathy. Skin:     General: Skin is warm and dry. Capillary Refill: Capillary refill takes less than 2 seconds. Neurological:      General: No focal deficit present. Mental Status: She is alert and oriented to person, place, and time.    Psychiatric:         Mood and Affect: Mood normal.         Behavior: Behavior normal.       SEFERINO Schneider

## 2023-10-19 ENCOUNTER — VBI (OUTPATIENT)
Dept: ADMINISTRATIVE | Facility: OTHER | Age: 33
End: 2023-10-19

## 2023-10-20 DIAGNOSIS — F90.9 ATTENTION DEFICIT HYPERACTIVITY DISORDER (ADHD), UNSPECIFIED ADHD TYPE: Primary | ICD-10-CM

## 2023-10-20 DIAGNOSIS — F51.01 PRIMARY INSOMNIA: ICD-10-CM

## 2023-10-20 RX ORDER — LISDEXAMFETAMINE DIMESYLATE 40 MG/1
40 CAPSULE ORAL EVERY MORNING
Qty: 30 CAPSULE | Refills: 0 | Status: SHIPPED | OUTPATIENT
Start: 2023-10-20 | End: 2023-10-23

## 2023-10-20 RX ORDER — ESZOPICLONE 3 MG/1
3 TABLET, FILM COATED ORAL
Qty: 30 TABLET | Refills: 0 | Status: SHIPPED | OUTPATIENT
Start: 2023-10-20 | End: 2023-10-30 | Stop reason: SDUPTHER

## 2023-10-23 DIAGNOSIS — F90.9 ATTENTION DEFICIT HYPERACTIVITY DISORDER (ADHD), UNSPECIFIED ADHD TYPE: Primary | ICD-10-CM

## 2023-10-23 RX ORDER — LISDEXAMFETAMINE DIMESYLATE CAPSULES 40 MG/1
40 CAPSULE ORAL EVERY MORNING
Qty: 30 CAPSULE | Refills: 0 | Status: SHIPPED | OUTPATIENT
Start: 2023-10-23 | End: 2023-11-20 | Stop reason: SDUPTHER

## 2023-10-30 ENCOUNTER — OFFICE VISIT (OUTPATIENT)
Dept: FAMILY MEDICINE CLINIC | Facility: CLINIC | Age: 33
End: 2023-10-30
Payer: COMMERCIAL

## 2023-10-30 VITALS
DIASTOLIC BLOOD PRESSURE: 60 MMHG | RESPIRATION RATE: 16 BRPM | WEIGHT: 169.87 LBS | OXYGEN SATURATION: 98 % | SYSTOLIC BLOOD PRESSURE: 111 MMHG | HEART RATE: 82 BPM | TEMPERATURE: 99.3 F | HEIGHT: 63 IN | BODY MASS INDEX: 30.1 KG/M2

## 2023-10-30 DIAGNOSIS — F51.01 PRIMARY INSOMNIA: ICD-10-CM

## 2023-10-30 DIAGNOSIS — Z23 ENCOUNTER FOR IMMUNIZATION: Primary | ICD-10-CM

## 2023-10-30 DIAGNOSIS — M05.9 SEROPOSITIVE RHEUMATOID ARTHRITIS (HCC): ICD-10-CM

## 2023-10-30 DIAGNOSIS — F41.9 ANXIETY: ICD-10-CM

## 2023-10-30 DIAGNOSIS — F33.41 RECURRENT MAJOR DEPRESSIVE DISORDER, IN PARTIAL REMISSION (HCC): ICD-10-CM

## 2023-10-30 DIAGNOSIS — F90.9 ATTENTION DEFICIT HYPERACTIVITY DISORDER (ADHD), UNSPECIFIED ADHD TYPE: ICD-10-CM

## 2023-10-30 DIAGNOSIS — E66.09 CLASS 1 OBESITY DUE TO EXCESS CALORIES WITHOUT SERIOUS COMORBIDITY WITH BODY MASS INDEX (BMI) OF 30.0 TO 30.9 IN ADULT: ICD-10-CM

## 2023-10-30 PROCEDURE — 99214 OFFICE O/P EST MOD 30 MIN: CPT | Performed by: NURSE PRACTITIONER

## 2023-10-30 PROCEDURE — 90686 IIV4 VACC NO PRSV 0.5 ML IM: CPT | Performed by: NURSE PRACTITIONER

## 2023-10-30 PROCEDURE — 90471 IMMUNIZATION ADMIN: CPT | Performed by: NURSE PRACTITIONER

## 2023-10-30 RX ORDER — ESZOPICLONE 3 MG/1
3 TABLET, FILM COATED ORAL
Qty: 30 TABLET | Refills: 0 | Status: SHIPPED | OUTPATIENT
Start: 2023-10-30

## 2023-10-30 RX ORDER — PROPRANOLOL HYDROCHLORIDE 10 MG/1
TABLET ORAL
COMMUNITY
Start: 2023-10-05

## 2023-10-30 NOTE — ASSESSMENT & PLAN NOTE
Fantastic job with 23 pound weight loss over the last 9 months. She will increase the Rybelsus to 14 mg this week. She will return in 3 months for weight check. She is to call the office with any issues with the medication.

## 2023-10-30 NOTE — PROGRESS NOTES
Name: Serjio Park      : 1990      MRN: [id number]  Encounter Provider: SEFERINO Pagan  Encounter Date: 10/30/2023   Encounter department: [address] [place] [address]     1. Encounter for immunization  -     influenza vaccine, quadrivalent, 0.5 mL, preservative-free, for adult and pediatric patients 6 mos+ (AFLURIA, FLUARIX, FLULAVAL, FLUZONE)    2. Primary insomnia  -     eszopiclone (LUNESTA) 3 MG tablet; Take 1 tablet (3 mg total) by mouth daily at bedtime    3. Seropositive rheumatoid arthritis ([address] [facility])    4. Anxiety    5. Recurrent major depressive disorder, in partial remission [facility])  Assessment & Plan:  Feels she is doing okay currently. She is seeing a therapist regularly. 6. Class 1 obesity due to excess calories without serious comorbidity with body mass index (BMI) of 30.0 to 30.9 in adult  Assessment & Plan:  Fantastic job with 23 pound weight loss over the last 9 months. She will increase the Rybelsus to 14 mg this week. She will return in 3 months for weight check. She is to call the office with any issues with the medication. 7. Attention deficit hyperactivity disorder (ADHD), unspecified ADHD type  Assessment & Plan:  Doing well with Vyvanse. Subjective      Patient presents for routine follow-up. She has not started the 14 mg of Rybelsus yet, but she will be starting this week. She has been using the 7 mg of Rybelsus and doing well with weight loss. She has lost a total of 23 pounds since starting the medication in 2023. She is no longer seeing her psychiatrist and will be getting her psychiatric medications through this office. She is doing well with current doses of Vyvanse, Lunesta and propanolol for anxiety. She is seeing a therapist virtually. Review of Systems   Constitutional:  Negative for chills and fever. HENT:  Negative for ear pain and sore throat.     Eyes:  Negative for pain and visual disturbance. Respiratory:  Negative for cough and shortness of breath. Cardiovascular:  Negative for chest pain and palpitations. Gastrointestinal:  Negative for abdominal pain, diarrhea, nausea and vomiting. Genitourinary:  Negative for dysuria and hematuria. Musculoskeletal:  Negative for arthralgias and back pain. Skin:  Negative for color change and rash. Neurological:  Positive for dizziness and light-headedness. Negative for seizures and syncope. Psychiatric/Behavioral:  Positive for dysphoric mood and sleep disturbance. The patient is nervous/anxious (6-7/10). All other systems reviewed and are negative.       Current Outpatient Medications on File Prior to Visit   Medication Sig    Ferrous Sulfate (IRON PO) Take by mouth    fluticasone (FLONASE) 50 mcg/act nasal spray 1 spray into each nostril daily    hydroxychloroquine (PLAQUENIL) 200 mg tablet Take 200 mg by mouth    lisdexamfetamine (VYVANSE) 40 MG capsule Take 1 capsule (40 mg total) by mouth every morning Max Daily Amount: 40 mg    predniSONE 5 mg tablet TAKE 2-3 TABLETS BY MOUTH EVERY DAY IN THE MORNING FOR 2 TO 3 DAYS AS NEEDED    propranolol (INDERAL) 10 mg tablet TAKE 1 TO 2 TABS PO DAILY PRN ANXIETY    semaglutide (Rybelsus) 14 MG tablet Take 1 tablet (14 mg total) by mouth daily before breakfast    [DISCONTINUED] cetirizine (ZyrTEC) 5 MG tablet Take 5 mg by mouth daily    [DISCONTINUED] eszopiclone (LUNESTA) 3 MG tablet Take 1 tablet (3 mg total) by mouth daily at bedtime    [DISCONTINUED] topiramate (TOPAMAX) 25 mg tablet Take 25 mg by mouth daily at bedtime    EPINEPHrine (EPIPEN) 0.3 mg/0.3 mL SOAJ Inject 0.3 mL (0.3 mg total) into a muscle once for 1 dose    ipratropium (ATROVENT) 0.03 % nasal spray 2 sprays into each nostril 3 (three) times a day       Objective     /60   Pulse 82   Temp 99.3 °F (37.4 °C)   Resp 16   Ht 5' 3" (1.6 m)   Wt 77.1 kg (169 lb 13.9 oz)   SpO2 98%   BMI 30.09 kg/m² Physical Exam  Constitutional:       Appearance: She is well-developed. Cardiovascular:      Rate and Rhythm: Normal rate and regular rhythm. Heart sounds: Normal heart sounds. No murmur heard. Pulmonary:      Effort: Pulmonary effort is normal. No respiratory distress. Breath sounds: Normal breath sounds. Skin:     General: Skin is warm and dry. Neurological:      Mental Status: She is alert and oriented to person, place, and time.        SEFERINO Forte

## 2023-11-17 ENCOUNTER — CLINICAL SUPPORT (OUTPATIENT)
Dept: FAMILY MEDICINE CLINIC | Facility: CLINIC | Age: 33
End: 2023-11-17
Payer: COMMERCIAL

## 2023-11-17 DIAGNOSIS — Z23 NEED FOR COVID-19 VACCINE: Primary | ICD-10-CM

## 2023-11-17 PROCEDURE — 91320 SARSCV2 VAC 30MCG TRS-SUC IM: CPT

## 2023-11-17 PROCEDURE — 90480 ADMN SARSCOV2 VAC 1/ONLY CMP: CPT

## 2023-11-20 DIAGNOSIS — F51.01 PRIMARY INSOMNIA: ICD-10-CM

## 2023-11-20 DIAGNOSIS — F41.9 ANXIETY: Primary | ICD-10-CM

## 2023-11-20 DIAGNOSIS — F90.9 ATTENTION DEFICIT HYPERACTIVITY DISORDER (ADHD), UNSPECIFIED ADHD TYPE: ICD-10-CM

## 2023-11-20 DIAGNOSIS — F41.9 ANXIETY: ICD-10-CM

## 2023-11-20 RX ORDER — PROPRANOLOL HYDROCHLORIDE 10 MG/1
TABLET ORAL
Qty: 90 TABLET | Refills: 0 | Status: SHIPPED | OUTPATIENT
Start: 2023-11-20 | End: 2023-11-20

## 2023-11-20 RX ORDER — LISDEXAMFETAMINE DIMESYLATE CAPSULES 40 MG/1
40 CAPSULE ORAL EVERY MORNING
Qty: 30 CAPSULE | Refills: 0 | Status: SHIPPED | OUTPATIENT
Start: 2023-11-20

## 2023-11-20 RX ORDER — ESZOPICLONE 3 MG/1
3 TABLET, FILM COATED ORAL
Qty: 30 TABLET | Refills: 0 | Status: SHIPPED | OUTPATIENT
Start: 2023-11-20

## 2023-11-20 RX ORDER — PROPRANOLOL HYDROCHLORIDE 10 MG/1
TABLET ORAL
Qty: 180 TABLET | Refills: 0 | Status: SHIPPED | OUTPATIENT
Start: 2023-11-20

## 2023-12-20 DIAGNOSIS — F90.9 ATTENTION DEFICIT HYPERACTIVITY DISORDER (ADHD), UNSPECIFIED ADHD TYPE: ICD-10-CM

## 2023-12-21 RX ORDER — LISDEXAMFETAMINE DIMESYLATE CAPSULES 40 MG/1
40 CAPSULE ORAL EVERY MORNING
Qty: 30 CAPSULE | Refills: 0 | Status: SHIPPED | OUTPATIENT
Start: 2023-12-21

## 2024-01-02 DIAGNOSIS — F51.01 PRIMARY INSOMNIA: ICD-10-CM

## 2024-01-02 RX ORDER — ESZOPICLONE 3 MG/1
3 TABLET, FILM COATED ORAL
Qty: 30 TABLET | Refills: 0 | Status: SHIPPED | OUTPATIENT
Start: 2024-01-02

## 2024-01-02 NOTE — TELEPHONE ENCOUNTER
Medication:  [facility]   [id number] 12/26/2023 12/21/2023  30.0 30  NA [employer] Commercial Insurance 0 / 0 [place]     1 [id number] 11/30/2023 11/20/2023 Eszopiclone (Tablet) 30.0 30 3 MG NA [employer] Commercial Insurance 0 / 0 [place]    1 [id number] 11/20/2023 11/20/2023  30.0 30  NA [employer] Commercial Insurance 0 / 0 [place]    1 [id number] 10/30/2023 10/30/2023 Eszopiclone (Tablet) 30.0 30 3 MG NA [employer] Commercial Insurance 0 / 0 [place]        Active agreement on file -No

## 2024-01-24 DIAGNOSIS — F90.9 ATTENTION DEFICIT HYPERACTIVITY DISORDER (ADHD), UNSPECIFIED ADHD TYPE: ICD-10-CM

## 2024-01-25 DIAGNOSIS — E66.09 CLASS 1 OBESITY DUE TO EXCESS CALORIES WITHOUT SERIOUS COMORBIDITY WITH BODY MASS INDEX (BMI) OF 31.0 TO 31.9 IN ADULT: ICD-10-CM

## 2024-01-25 RX ORDER — LISDEXAMFETAMINE DIMESYLATE CAPSULES 40 MG/1
40 CAPSULE ORAL EVERY MORNING
Qty: 30 CAPSULE | Refills: 0 | Status: SHIPPED | OUTPATIENT
Start: 2024-01-25 | End: 2024-01-30

## 2024-01-30 ENCOUNTER — ANNUAL EXAM (OUTPATIENT)
Dept: OBGYN CLINIC | Facility: CLINIC | Age: 34
End: 2024-01-30
Payer: COMMERCIAL

## 2024-01-30 ENCOUNTER — OFFICE VISIT (OUTPATIENT)
Dept: FAMILY MEDICINE CLINIC | Facility: CLINIC | Age: 34
End: 2024-01-30
Payer: COMMERCIAL

## 2024-01-30 VITALS
WEIGHT: 169.4 LBS | HEIGHT: 63 IN | BODY MASS INDEX: 30.02 KG/M2 | OXYGEN SATURATION: 98 % | SYSTOLIC BLOOD PRESSURE: 110 MMHG | DIASTOLIC BLOOD PRESSURE: 70 MMHG | HEART RATE: 94 BPM | TEMPERATURE: 97.7 F

## 2024-01-30 VITALS
DIASTOLIC BLOOD PRESSURE: 70 MMHG | BODY MASS INDEX: 30.05 KG/M2 | HEART RATE: 90 BPM | WEIGHT: 169.6 LBS | OXYGEN SATURATION: 98 % | SYSTOLIC BLOOD PRESSURE: 100 MMHG | HEIGHT: 63 IN

## 2024-01-30 DIAGNOSIS — Z01.419 ENCOUNTER FOR GYNECOLOGICAL EXAMINATION WITHOUT ABNORMAL FINDING: Primary | ICD-10-CM

## 2024-01-30 DIAGNOSIS — F33.41 RECURRENT MAJOR DEPRESSIVE DISORDER, IN PARTIAL REMISSION (HCC): ICD-10-CM

## 2024-01-30 DIAGNOSIS — M05.9 SEROPOSITIVE RHEUMATOID ARTHRITIS (HCC): ICD-10-CM

## 2024-01-30 DIAGNOSIS — F41.9 ANXIETY: ICD-10-CM

## 2024-01-30 DIAGNOSIS — F90.9 ATTENTION DEFICIT HYPERACTIVITY DISORDER (ADHD), UNSPECIFIED ADHD TYPE: ICD-10-CM

## 2024-01-30 DIAGNOSIS — E66.09 CLASS 1 OBESITY DUE TO EXCESS CALORIES WITHOUT SERIOUS COMORBIDITY WITH BODY MASS INDEX (BMI) OF 31.0 TO 31.9 IN ADULT: Primary | ICD-10-CM

## 2024-01-30 DIAGNOSIS — Z00.00 HEALTHCARE MAINTENANCE: ICD-10-CM

## 2024-01-30 PROBLEM — E55.9 VITAMIN D DEFICIENCY: Status: RESOLVED | Noted: 2023-08-25 | Resolved: 2024-01-30

## 2024-01-30 PROCEDURE — S0612 ANNUAL GYNECOLOGICAL EXAMINA: HCPCS

## 2024-01-30 PROCEDURE — 99214 OFFICE O/P EST MOD 30 MIN: CPT | Performed by: NURSE PRACTITIONER

## 2024-01-30 PROCEDURE — G0145 SCR C/V CYTO,THINLAYER,RESCR: HCPCS

## 2024-01-30 PROCEDURE — G0476 HPV COMBO ASSAY CA SCREEN: HCPCS

## 2024-01-30 RX ORDER — METHYLPHENIDATE HYDROCHLORIDE 18 MG/1
18 TABLET ORAL DAILY
Qty: 30 TABLET | Refills: 0 | Status: SHIPPED | OUTPATIENT
Start: 2024-01-30

## 2024-01-30 NOTE — PATIENT INSTRUCTIONS
Pap Smear   AMBULATORY CARE:   A Pap smear,  or Pap test, is used to screen for cervical cancer. It is also used to find precancerous and cancerous cells of the vulva and vagina.  Prepare for a Pap smear:  The best time to schedule the test is right after your period stops. Do not have a Pap smear during your monthly period.  During a Pap smear:   You will lie on your back and place your feet on footrests called stirrups. Your healthcare provider will gently insert a device called a speculum into your vagina. The speculum is used to open the walls of your vagina so your provider can see your cervix.    Your provider will gently take cell samples from your cervix and vagina. The samples are placed in a container with liquid or on a glass slide. They are sent to a lab and examined for abnormal cells. A test for human papillomavirus (HPV) may be done at the same time. HPV is a sexually transmitted virus that can cause changes in cervical cells.    After a Pap smear:  You may have some spotting the day of your procedure.  Test results:  Your healthcare provider will tell you when you can expect your Pap smear results. It usually takes about 1 to 3 weeks.  Normal results  mean that no cell changes were found on your cervix. You may be able to wait 3 to 5 years for your next Pap smear exam.    Unclear results  may mean they did not get a good sample of cervical tissue. Or, it may mean there are changes in your cells that look abnormal. This could be due to an infection, pregnancy, menopause, or HPV. You may need another Pap smear in 1 year. Your healthcare provider will tell you what to do next.     Abnormal results  mean that cell changes were found on your cervix. This does not mean you have cervical cancer. It could mean you have inflammation or an infection. It could also mean you have HPV or cells that might develop into cancer. Your healthcare provider will explain the abnormal test result to you and go over next  steps with you. He or she may recommend a colposcopy. During this procedure, a small scope with a light is used to look more closely at your cervix and vagina.    How often to get a Pap smear:  Pap smears usually start at age 21 and continue until age 65. A Pap smear alone may be done every 3 years. An HPV test alone or with a Pap smear may be done every 5 years, starting at age 30. You may need Pap smears more often or after age 65 if you have any of the following:  Abnormal Pap smear result    Positive HPV test result    A history of cervical cancer, or a high risk for cervical cancer    HIV    A weak immune system    Exposure to diethylstilbestrol (CHEYANNE) medicine when your mother was pregnant with you    Call your doctor if:   You have severe bleeding.     It has been 3 weeks and you do not have test results.    You have questions or concerns about your condition or care.    © Copyright [employer] 2023 Information is for End User's use only and may not be sold, redistributed or otherwise used for commercial purposes.  The above information is an  only. It is not intended as medical advice for individual conditions or treatments. Talk to your doctor, nurse or pharmacist before following any medical regimen to see if it is safe and effective for you.    Intrauterine Device   AMBULATORY CARE:   An IUD  is a type of birth control that is inserted into your uterus. It is a small, flexible piece of plastic with a string on the end. It is inserted and removed by your healthcare provider. IUDs prevent sperm from reaching or fertilizing an egg. IUDs also prevent a fertilized egg from attaching to the uterus and developing into a fetus.       Common types of IUDs:  Your healthcare provider will recommend the type of IUD that is right for you. This is based on your age and if you have had a child. If you have not had a child, a smaller IUD will be used.     A copper IUD  slowly releases a small amount of copper  into your uterus. This IUD can remain in place for up to 10 years.    A hormone-releasing IUD  slowly releases a small amount of progesterone into your uterus. Progesterone is a hormone that is made by your body to help control your periods. This IUD can remain in place for 3 to 5 years.  Seek care immediately if:   You have severe pain or bleeding during your period.    You have a fever and severe abdominal pain.    Call your doctor or gynecologist if:   You think you are pregnant.    The IUD has come out.    You have bleeding from your vagina after you have sex, and it is not your period.    You have pain during sex.    You cannot feel the IUD string, the string feels longer, or you feel the plastic of the IUD itself.    You have vaginal discharge that is green, yellow, or has a foul odor.    You have questions or concerns about your condition or care.    Advantages of an IUD:   An IUD is 98% to 99% effective in preventing pregnancy.    The IUD can be removed by your healthcare provider if you decide to have a baby. You may be able to get pregnant as soon as the IUD is removed.    An IUD protects you from pregnancy right after it is inserted.    You do not have to stop sexual activity to insert it. You do not have to remember to take your birth control pill.    Copper IUDs are safer for some women than oral birth control pills. Examples include women who smoke or have a history of blood clots.    Hormone-releasing IUDs may decrease certain health problems. Examples include bleeding and cramping that happen with your monthly period.    Disadvantages of an IUD:   There is a small chance that you could get pregnant. Sometimes the IUD cannot be removed after you get pregnant. This increases your risk of a miscarriage or an ectopic pregnancy. Ectopic pregnancy is when the fertilized egg starts to grow somewhere other than your uterus.    An IUD does not protect you from sexually transmitted infections.    You may have  cramps during the first weeks after you get the IUD.    A copper IUD may cause your monthly period to be heavier or more painful. This is more common within the first 3 months after you get the IUD. You may need to have your IUD removed if your bleeding or pain becomes severe. You may have spotting between periods.    There is a small risk of an infection within the first 20 days after the IUD is placed. Infection can lead to pelvic inflammatory disease. This can cause infertility.    Your uterus may tear when the IUD is inserted. The IUD may slip part or all of the way out of your uterus.    Self-care:   NSAIDs , such as ibuprofen, help decrease swelling, pain, and fever. This medicine is available with or without a doctor's order. NSAIDs can cause stomach bleeding or kidney problems in certain people. If you take blood thinner medicine, always ask if NSAIDs are safe for you. Always read the medicine label and follow directions.    Apply heat to relieve pain and cramping.  Use a heating pad set on low. Apply heat to your lower abdomen for 20 minutes every hour, or as directed.    Return to activities as directed.  Your healthcare provider will tell you when it is okay to return to work, school, or other activities.    Do not use a tampon or have sex  until your provider says it is okay.    Make sure your IUD is in place:  An IUD has a string that is made of plastic thread. One to 2 inches of this string hangs into your vagina. You cannot see this string, and it should not cause problems when you have sex. Check your IUD string every 3 days for the first 3 months that you have your IUD. After that, check the string after each monthly period. Do the following to check the placement of your IUD:  Wash your hands with soap and warm water. Dry them with a clean towel.    Bend your knees and squat low to the ground.    Gently put your index finger inside your vagina. The cervix is at the top of the vagina and feels like  the tip of your nose. Feel for the IUD string. Do not pull on the string. You should not be able to feel the firm plastic of the IUD itself.     Wash your hands after you check your IUD string.    For more information:   [facility]  [address] [address]  Phone: [phone]  Web Address: [email or web address]    Follow up with your doctor as directed:  Write down your questions so you remember to ask them during your visits.  © Copyright [employer] 2023 Information is for End User's use only and may not be sold, redistributed or otherwise used for commercial purposes.  The above information is an  only. It is not intended as medical advice for individual conditions or treatments. Talk to your doctor, nurse or pharmacist before following any medical regimen to see if it is safe and effective for you.

## 2024-01-30 NOTE — ASSESSMENT & PLAN NOTE
Vyvanse is not working well for her, making her very irritable and difficult to find from the pharmacy. We will switch the Methylphenidate daily and return in 1 month.

## 2024-01-30 NOTE — ASSESSMENT & PLAN NOTE
Patient is having a difficult time with Rybelsus adherence. Will see if we can get Wegovy covered. She had been doing very well with Rybelsus.

## 2024-01-30 NOTE — PROGRESS NOTES
Assessment/Plan:    Encounter for gynecological examination without abnormal finding  Normal findings on routine gyn exam  ASCCP guidelines reviewed.   Last pap , Pap collected today.  IUD strings visualized on exam.   STD screening offered. Pt declined - low risk  Self breast awareness encouraged.   Daily exercise and healthy diet encouraged.   F/U Annually and PRN       Diagnoses and all orders for this visit:    Encounter for gynecological examination without abnormal finding  -     Liquid-based pap, screening          Health Maintenance:    Last PAP: 2020. negative  Next PAP Due: collected today    Gardasil Vaccine Series: She has not had the Gardasil series.    Subjective    CC: Yearly Exam     Angelica Beck is a 34 y.o. female  here for an annual exam.      Menarche: 11 Y/O    No LMP recorded. Patient has had an implant.  Sexual activity: She is sexually active without pain, bleeding or dryness.   Contraception:  Mirena placed   STD testing:  She does not want STD testing today.   former smoker, social drinker    Patient is here for annual exam. She has no questions or concerns for today's visit.   Her menstrual cycles are rare with IUD. Denies any issues with her current BCM or bleeding.   Fhx of breast cancer, saw genetic counselor, no early screening was recommended.     Family hx of breast cancer: Mother (66), MGM (80s)  Family hx of ovarian cancer: denies  Family hx of colon cancer: denies     Past Medical History:   Diagnosis Date    Abnormal Pap smear of cervix     Allergic     sulfa based drugs    Anxiety     Arthritis 2016    rheumatoid    Chlamydia     Depression     Eating disorder     Memory loss     Rheumatoid arthritis (HCC)     Varicella      Past Surgical History:   Procedure Laterality Date    APPENDECTOMY      WISDOM TOOTH EXTRACTION         Immunization History   Administered Date(s) Administered    COVID-19 PFIZER VACCINE 0.3 ML IM 2021, 2021, 2021     COVID-19 Pfizer mRNA vacc PF ronald-sucrose 12 yr and older (Comirnaty) 2023    COVID-19 Pfizer vac (Ronald-sucrose, gray cap) 12 yr+ IM 2022    DTP 1991, 1991, 1991    DTaP 1992, 1996    HPV Quadrivalent 2007, 2007, 2007    Hep A, adult 2012    Hep B, Adolescent or Pediatric 1996, 2000, 2003    HiB 1991, 1991, 1991, 05/15/1992    INFLUENZA 2022    Influenza, injectable, quadrivalent, preservative free 0.5 mL 10/30/2023    Influenza, recombinant, quadrivalent,injectable, preservative free 2021, 2022    MMR 05/15/1992, 2000    Meningococcal Conjugate (MCV4O) 2006, 2011    Meningococcal MCV4P 2006, 2011    OPV 1991, 1991, 1992, 1996    Tdap 2007, 2017, 2021    Tuberculin Skin Test-PPD Intradermal 1991, 1992, 2006, 2011    Varicella 1998       Family History   Problem Relation Age of Onset    Mental illness Mother     Depression Mother     Rheum arthritis Mother     Breast cancer Mother         DCIS in-suti, radial scar    Hypertension Mother     Asthma Mother         developed asthma later in life, after RA diagnosis and reaction to methrotrexate    Autoimmune disease Mother         RA    Esophageal cancer Father     Alcohol abuse Father     Mental illness Father     Depression Father     Diabetes Father     Cancer Father          of metastasized esophageal cancer 01/15/2014    Bipolar disorder Father     Drug abuse Father     Mental illness Sister     Rheum arthritis Sister     Mental illness Sister     Rheum arthritis Sister     Depression Sister     Autoimmune disease Sister         RA    Anxiety disorder Sister     Breast cancer Maternal Grandmother     OCD Maternal Grandfather     Depression Sister     Hypertension Sister     Anxiety disorder Sister     Colon cancer Neg Hx     Ovarian cancer  Neg Hx     Cervical cancer Neg Hx     Uterine cancer Neg Hx      Social History     Tobacco Use    Smoking status: Former     Current packs/day: 0.00     Average packs/day: 1 pack/day for 3.0 years (3.0 ttl pk-yrs)     Types: Cigarettes, Pipe, Cigars     Start date: 2009     Quit date: 2012     Years since quittin.1    Smokeless tobacco: Never   Vaping Use    Vaping status: Never Used   Substance Use Topics    Alcohol use: Yes     Alcohol/week: 8.0 - 12.0 standard drinks of alcohol     Types: 8 - 12 Standard drinks or equivalent per week    Drug use: Yes     Frequency: 7.0 times per week     Types: Marijuana     Comment: have mmj card       Current Outpatient Medications:     eszopiclone (LUNESTA) 3 MG tablet, Take 1 tablet (3 mg total) by mouth daily at bedtime, Disp: 30 tablet, Rfl: 0    Ferrous Sulfate (IRON PO), Take by mouth, Disp: , Rfl:     fluticasone (FLONASE) 50 mcg/act nasal spray, 1 spray into each nostril daily, Disp: 18.2 mL, Rfl: 0    hydroxychloroquine (PLAQUENIL) 200 mg tablet, Take 200 mg by mouth, Disp: , Rfl:     ipratropium (ATROVENT) 0.03 % nasal spray, 2 sprays into each nostril 3 (three) times a day, Disp: 30 mL, Rfl: 0    methylphenidate (Concerta) 18 mg ER tablet, Take 1 tablet (18 mg total) by mouth daily Max Daily Amount: 18 mg, Disp: 30 tablet, Rfl: 0    predniSONE 5 mg tablet, TAKE 2-3 TABLETS BY MOUTH EVERY DAY IN THE MORNING FOR 2 TO 3 DAYS AS NEEDED, Disp: , Rfl:     propranolol (INDERAL) 10 mg tablet, TAKE 1 TO 2 TABLETS BY MOUTH DAILY AS NEEDED FOR ANXIETY, Disp: 180 tablet, Rfl: 0    semaglutide (Rybelsus) 14 MG tablet, Take 1 tablet (14 mg total) by mouth daily before breakfast, Disp: 30 tablet, Rfl: 0    Semaglutide-Weight Management (WEGOVY) 0.25 MG/0.5ML, Inject 0.5 mL (0.25 mg total) under the skin once a week, Disp: 2 mL, Rfl: 0    EPINEPHrine (EPIPEN) 0.3 mg/0.3 mL SOAJ, Inject 0.3 mL (0.3 mg total) into a muscle once for 1 dose, Disp: 0.6 mL, Rfl: 0  Patient  "Active Problem List    Diagnosis Date Noted    Encounter for gynecological examination without abnormal finding 2024    ADHD 12/10/2021    Anxiety 2020    Recurrent major depressive disorder, in partial remission ([facility]) 2020    Seropositive rheumatoid arthritis ([facility]) 2020    Obesity due to excess calories 2020       Allergies   Allergen Reactions    Sulfa Antibiotics Rash       OB History    Para Term  AB Living   0 0 0 0 0 0   SAB IAB Ectopic Multiple Live Births   0 0 0 0 0   Obstetric Comments   Menarche 12 y.o       Vitals:    24 0842   BP: 100/70   BP Location: Left arm   Patient Position: Sitting   Pulse: 90   SpO2: 98%   Weight: 76.9 kg (169 lb 9.6 oz)   Height: 5' 3\" (1.6 m)     Body mass index is 30.04 kg/m².    Review of Systems   Constitutional:  Negative for chills and fever.   Gastrointestinal:  Negative for abdominal pain, constipation, diarrhea, nausea and vomiting.   Genitourinary:  Negative for difficulty urinating, dyspareunia, dysuria, frequency, menstrual problem, pelvic pain, urgency, vaginal bleeding, vaginal discharge and vaginal pain.   Musculoskeletal:  Negative for back pain and myalgias.   Skin:  Negative for pallor and rash.   Neurological:  Negative for dizziness, light-headedness and headaches.   Hematological:  Negative for adenopathy.   Psychiatric/Behavioral:  Negative for dysphoric mood.         Physical Exam  Constitutional:       General: She is not in acute distress.     Appearance: Normal appearance. She is not ill-appearing.   Genitourinary:      Urethral meatus normal.      No lesions in the vagina.      Right Labia: No rash, tenderness, lesions, skin changes or Bartholin's cyst.     Left Labia: No tenderness, lesions, skin changes, Bartholin's cyst or rash.     No inguinal adenopathy present in the right or left side.     No vaginal discharge, erythema, tenderness, bleeding or ulceration.        Right Adnexa: not tender, not " full and no mass present.     Left Adnexa: not tender, not full and no mass present.     Cervix is nulliparous.      No cervical motion tenderness, discharge, friability, lesion, polyp or nabothian cyst.      IUD strings visualized.      Uterus is not enlarged, fixed or tender.      No uterine mass detected.     No urethral prolapse, tenderness or discharge present.      Bladder is not tender and urgency on palpation not present.       Pelvic exam was performed with patient in the lithotomy position.   Breasts:     Right: No swelling, inverted nipple, mass, nipple discharge, skin change or tenderness.      Left: No swelling, inverted nipple, mass, nipple discharge, skin change or tenderness.   HENT:      Head: Normocephalic and atraumatic.   Eyes:      Conjunctiva/sclera: Conjunctivae normal.   Pulmonary:      Effort: Pulmonary effort is normal.   Abdominal:      General: There is no distension.      Palpations: Abdomen is soft.      Tenderness: There is no abdominal tenderness.   Musculoskeletal:         General: Normal range of motion.      Cervical back: Neck supple.   Lymphadenopathy:      Upper Body:      Right upper body: No supraclavicular or axillary adenopathy.      Left upper body: No supraclavicular or axillary adenopathy.      Lower Body: No right inguinal adenopathy. No left inguinal adenopathy.   Neurological:      Mental Status: She is alert and oriented to person, place, and time.   Skin:     General: Skin is warm and dry.   Psychiatric:         Mood and Affect: Mood normal.         Behavior: Behavior normal.         Thought Content: Thought content normal.         Judgment: Judgment normal.   Vitals and nursing note reviewed.

## 2024-01-30 NOTE — ASSESSMENT & PLAN NOTE
Normal findings on routine gyn exam  ASCCP guidelines reviewed.   Last pap 2020, Pap collected today.  IUD strings visualized on exam.   STD screening offered. Pt declined - low risk  Self breast awareness encouraged.   Daily exercise and healthy diet encouraged.   F/U Annually and PRN

## 2024-01-30 NOTE — PROGRESS NOTES
Name: Angelica Beck      : 1990      MRN: [id number]  Encounter Provider: SEFERINO Mccarty  Encounter Date: 2024   Encounter department: [facility] [address]    Assessment & Plan     1. Class 1 obesity due to excess calories without serious comorbidity with body mass index (BMI) of 31.0 to 31.9 in adult  Assessment & Plan:  Patient is having a difficult time with Rybelsus adherence. Will see if we can get Wegovy covered. She had been doing very well with Rybelsus.     Orders:  -     Semaglutide-Weight Management (WEGOVY) 0.25 MG/0.5ML; Inject 0.5 mL (0.25 mg total) under the skin once a week    2. Attention deficit hyperactivity disorder (ADHD), unspecified ADHD type  Assessment & Plan:  Vyvanse is not working well for her, making her very irritable and difficult to find from the pharmacy. We will switch the Methylphenidate daily and return in 1 month.     Orders:  -     methylphenidate (Concerta) 18 mg ER tablet; Take 1 tablet (18 mg total) by mouth daily Max Daily Amount: 18 mg    3. Healthcare maintenance  -     CBC and differential; Future  -     Comprehensive metabolic panel; Future  -     Lipid panel; Future  -     TSH, 3rd generation with Free T4 reflex; Future    4. Seropositive rheumatoid arthritis (HCC)  Assessment & Plan:  Follows with Rheum. Continues on Plaquenil.       5. Anxiety  Assessment & Plan:  Continues therapy.       6. Recurrent major depressive disorder, in partial remission (HCC)           Subjective      Patient presents for routine follow up. She is not sleeping well with lunesta. She is very anxious. She does not feel vyvanse is working well and is difficult to get. She is struggling to take rybelsus.   Her sister just diagnosed with hashimotos.       Review of Systems   Constitutional:  Negative for chills, diaphoresis and fever.   HENT:  Negative for ear pain and sore throat.    Respiratory:  Negative for cough and shortness of  "breath.    Cardiovascular:  Negative for chest pain and palpitations.   Gastrointestinal:  Negative for abdominal pain, constipation, diarrhea, nausea and vomiting.   Genitourinary:  Negative for dysuria, frequency and hematuria.   Musculoskeletal:  Negative for arthralgias and back pain.   Skin:  Negative for color change and rash.   Neurological:  Negative for dizziness, seizures, syncope, light-headedness and headaches.   Psychiatric/Behavioral:  Positive for dysphoric mood (very irritable) and sleep disturbance. The patient is nervous/anxious.    All other systems reviewed and are negative.      Current Outpatient Medications on File Prior to Visit   Medication Sig    eszopiclone (LUNESTA) 3 MG tablet Take 1 tablet (3 mg total) by mouth daily at bedtime    Ferrous Sulfate (IRON PO) Take by mouth    fluticasone (FLONASE) 50 mcg/act nasal spray 1 spray into each nostril daily    hydroxychloroquine (PLAQUENIL) 200 mg tablet Take 200 mg by mouth    ipratropium (ATROVENT) 0.03 % nasal spray 2 sprays into each nostril 3 (three) times a day    predniSONE 5 mg tablet TAKE 2-3 TABLETS BY MOUTH EVERY DAY IN THE MORNING FOR 2 TO 3 DAYS AS NEEDED    propranolol (INDERAL) 10 mg tablet TAKE 1 TO 2 TABLETS BY MOUTH DAILY AS NEEDED FOR ANXIETY    semaglutide (Rybelsus) 14 MG tablet Take 1 tablet (14 mg total) by mouth daily before breakfast    [DISCONTINUED] lisdexamfetamine (VYVANSE) 40 MG capsule Take 1 capsule (40 mg total) by mouth every morning Max Daily Amount: 40 mg    EPINEPHrine (EPIPEN) 0.3 mg/0.3 mL SOAJ Inject 0.3 mL (0.3 mg total) into a muscle once for 1 dose       Objective     /70 (BP Location: Left arm, Patient Position: Sitting, Cuff Size: Standard)   Pulse 94   Temp 97.7 °F (36.5 °C) (Tympanic)   Ht 5' 3\" (1.6 m)   Wt 76.8 kg (169 lb 6.4 oz)   SpO2 98%   BMI 30.01 kg/m²     Physical Exam  Constitutional:       Appearance: She is well-developed.   Cardiovascular:      Rate and Rhythm: Normal rate " and regular rhythm.      Heart sounds: Normal heart sounds. No murmur heard.  Pulmonary:      Effort: Pulmonary effort is normal. No respiratory distress.      Breath sounds: Normal breath sounds.   Skin:     General: Skin is warm and dry.   Neurological:      Mental Status: She is alert and oriented to person, place, and time.       SEFERINO Mccarty

## 2024-01-31 ENCOUNTER — PATIENT MESSAGE (OUTPATIENT)
Dept: FAMILY MEDICINE CLINIC | Facility: CLINIC | Age: 34
End: 2024-01-31

## 2024-01-31 LAB
HPV HR 12 DNA CVX QL NAA+PROBE: NEGATIVE
HPV16 DNA CVX QL NAA+PROBE: NEGATIVE
HPV18 DNA CVX QL NAA+PROBE: NEGATIVE

## 2024-02-01 ENCOUNTER — TELEPHONE (OUTPATIENT)
Dept: FAMILY MEDICINE CLINIC | Facility: CLINIC | Age: 34
End: 2024-02-01

## 2024-02-05 DIAGNOSIS — F51.01 PRIMARY INSOMNIA: ICD-10-CM

## 2024-02-05 RX ORDER — ESZOPICLONE 3 MG/1
3 TABLET, FILM COATED ORAL
Qty: 30 TABLET | Refills: 0 | Status: SHIPPED | OUTPATIENT
Start: 2024-02-05

## 2024-02-06 ENCOUNTER — TELEPHONE (OUTPATIENT)
Dept: FAMILY MEDICINE CLINIC | Facility: CLINIC | Age: 34
End: 2024-02-06

## 2024-02-06 LAB
LAB AP GYN PRIMARY INTERPRETATION: NORMAL
Lab: NORMAL

## 2024-02-12 ENCOUNTER — PATIENT MESSAGE (OUTPATIENT)
Dept: FAMILY MEDICINE CLINIC | Facility: CLINIC | Age: 34
End: 2024-02-12

## 2024-02-20 ENCOUNTER — TELEPHONE (OUTPATIENT)
Dept: FAMILY MEDICINE CLINIC | Facility: CLINIC | Age: 34
End: 2024-02-20

## 2024-02-21 PROBLEM — Z01.419 ENCOUNTER FOR GYNECOLOGICAL EXAMINATION WITHOUT ABNORMAL FINDING: Status: RESOLVED | Noted: 2024-01-30 | Resolved: 2024-02-21

## 2024-03-01 ENCOUNTER — OFFICE VISIT (OUTPATIENT)
Dept: FAMILY MEDICINE CLINIC | Facility: CLINIC | Age: 34
End: 2024-03-01
Payer: COMMERCIAL

## 2024-03-01 VITALS
TEMPERATURE: 98.7 F | SYSTOLIC BLOOD PRESSURE: 122 MMHG | HEIGHT: 63 IN | DIASTOLIC BLOOD PRESSURE: 74 MMHG | OXYGEN SATURATION: 98 % | HEART RATE: 80 BPM | BODY MASS INDEX: 30.12 KG/M2 | WEIGHT: 170 LBS

## 2024-03-01 DIAGNOSIS — M62.838 CERVICAL PARASPINAL MUSCLE SPASM: ICD-10-CM

## 2024-03-01 DIAGNOSIS — M54.2 CHRONIC NECK PAIN: Primary | ICD-10-CM

## 2024-03-01 DIAGNOSIS — G89.29 CHRONIC NECK PAIN: Primary | ICD-10-CM

## 2024-03-01 DIAGNOSIS — M05.9 SEROPOSITIVE RHEUMATOID ARTHRITIS (HCC): ICD-10-CM

## 2024-03-01 PROCEDURE — 99214 OFFICE O/P EST MOD 30 MIN: CPT | Performed by: FAMILY MEDICINE

## 2024-03-01 RX ORDER — PREDNISONE 20 MG/1
40 TABLET ORAL DAILY
Qty: 6 TABLET | Refills: 0 | Status: SHIPPED | OUTPATIENT
Start: 2024-03-01 | End: 2024-03-04

## 2024-03-01 RX ORDER — CYCLOBENZAPRINE HCL 10 MG
10 TABLET ORAL 3 TIMES DAILY PRN
Qty: 30 TABLET | Refills: 0 | Status: SHIPPED | OUTPATIENT
Start: 2024-03-01

## 2024-03-01 NOTE — PROGRESS NOTES
"Assessment/Plan:    No problem-specific Assessment & Plan notes found for this encounter.     Diagnoses and all orders for this visit:    Chronic neck pain  -     predniSONE 20 mg tablet; Take 2 tablets (40 mg total) by mouth daily for 3 days    Cervical paraspinal muscle spasm  -     cyclobenzaprine (FLEXERIL) 10 mg tablet; Take 1 tablet (10 mg total) by mouth 3 (three) times a day as needed for muscle spasms  -     Ambulatory Referral to Physical Therapy; Future  -     predniSONE 20 mg tablet; Take 2 tablets (40 mg total) by mouth daily for 3 days    Seropositive rheumatoid arthritis (HCC)        Discussed heat, muscle relaxer, PT.     Subjective:      Patient ID: Angelica Beck is a 34 y.o. female.    HPI    Notes that she has been having neck pain since last Thursday. This has been ongoing since 2015. Notes that prednisone and Ibuprofen are not helping. Has been taking 15 mg of prednisone for 1-2 days. This started to feel better and then got worse again. Took another 15 mg of prednisone yesterday. Has been suing 400 mg of Ibuprofen.     Had Xrs done with chiropractor.      Following with rheumatology.     The following portions of the patient's history were reviewed and updated as appropriate: allergies, current medications, past family history, past medical history, past social history, past surgical history, and problem list.    Review of Systems      Objective:    /74   Pulse 80   Temp 98.7 °F (37.1 °C)   Ht 5' 3\" (1.6 m)   Wt 77.1 kg (170 lb)   SpO2 98%   BMI 30.11 kg/m²      Physical Exam  Vitals reviewed.   Constitutional:       General: She is not in acute distress.     Appearance: Normal appearance.   HENT:      Head: Normocephalic and atraumatic.      Right Ear: External ear normal.      Left Ear: External ear normal.      Nose: Nose normal.      Mouth/Throat:      Mouth: Mucous membranes are moist.   Eyes:      Extraocular Movements: Extraocular movements intact.      Conjunctiva/sclera: " Conjunctivae normal.   Cardiovascular:      Rate and Rhythm: Normal rate and regular rhythm.   Pulmonary:      Effort: Pulmonary effort is normal.      Breath sounds: Normal breath sounds.   Abdominal:      General: Bowel sounds are normal.      Palpations: Abdomen is soft.   Musculoskeletal:      Cervical back: Spasms present. No bony tenderness. Decreased range of motion.      Right lower leg: No edema.      Left lower leg: No edema.   Skin:     General: Skin is warm.      Capillary Refill: Capillary refill takes less than 2 seconds.   Neurological:      Mental Status: She is alert. Mental status is at baseline.           DO Fer Chang [facility]  3/1/2024 1:41 PM

## 2024-03-06 DIAGNOSIS — F90.9 ATTENTION DEFICIT HYPERACTIVITY DISORDER (ADHD), UNSPECIFIED ADHD TYPE: ICD-10-CM

## 2024-03-06 DIAGNOSIS — F51.01 PRIMARY INSOMNIA: ICD-10-CM

## 2024-03-07 ENCOUNTER — TELEPHONE (OUTPATIENT)
Dept: FAMILY MEDICINE CLINIC | Facility: CLINIC | Age: 34
End: 2024-03-07

## 2024-03-07 DIAGNOSIS — E66.09 CLASS 1 OBESITY DUE TO EXCESS CALORIES WITHOUT SERIOUS COMORBIDITY WITH BODY MASS INDEX (BMI) OF 31.0 TO 31.9 IN ADULT: Primary | ICD-10-CM

## 2024-03-07 RX ORDER — ESZOPICLONE 3 MG/1
3 TABLET, FILM COATED ORAL
Qty: 30 TABLET | Refills: 0 | Status: SHIPPED | OUTPATIENT
Start: 2024-03-07

## 2024-03-07 RX ORDER — METHYLPHENIDATE HYDROCHLORIDE 18 MG/1
18 TABLET ORAL DAILY
Qty: 30 TABLET | Refills: 0 | Status: SHIPPED | OUTPATIENT
Start: 2024-03-07

## 2024-03-07 NOTE — TELEPHONE ENCOUNTER
"Patient booked an appointment for 03/15 through [employer] but had the following message attached:    \"I was supposed to have a one-month follow-up with Maria Luisa on 3/1 due to starting a new medication, but Maria Luisa was out sick. I'm doing well on the new medication, and a refill was just sent to the pharmacy. I'm not sure when Maria Luisa wants to see me again, but I'm traveling for work March 17-April 7, and traveling for fun April 9-April 22. If she wants to see me before then, then I'll keep this appointment, but if it can wait I can reschedule when I'm back in April. Sorry for the novel. Thank you!\"       Please advise, thank you!  "

## 2024-03-08 ENCOUNTER — EVALUATION (OUTPATIENT)
Dept: PHYSICAL THERAPY | Facility: MEDICAL CENTER | Age: 34
End: 2024-03-08
Payer: COMMERCIAL

## 2024-03-08 DIAGNOSIS — M62.838 CERVICAL PARASPINAL MUSCLE SPASM: ICD-10-CM

## 2024-03-08 PROCEDURE — 97110 THERAPEUTIC EXERCISES: CPT | Performed by: PHYSICAL THERAPIST

## 2024-03-08 PROCEDURE — 97161 PT EVAL LOW COMPLEX 20 MIN: CPT | Performed by: PHYSICAL THERAPIST
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
## 2024-03-13 ENCOUNTER — OFFICE VISIT (OUTPATIENT)
Dept: PHYSICAL THERAPY | Facility: MEDICAL CENTER | Age: 34
End: 2024-03-13
Payer: COMMERCIAL

## 2024-03-13 DIAGNOSIS — M62.838 CERVICAL PARASPINAL MUSCLE SPASM: Primary | ICD-10-CM

## 2024-03-13 PROCEDURE — 97112 NEUROMUSCULAR REEDUCATION: CPT | Performed by: PHYSICAL THERAPIST

## 2024-03-13 PROCEDURE — 97140 MANUAL THERAPY 1/> REGIONS: CPT | Performed by: PHYSICAL THERAPIST

## 2024-03-13 NOTE — PROGRESS NOTES
"Daily Note     Today's date: 3/13/2024  Patient name: Angelica Beck  : 1990  MRN: [id number]  Referring provider: Monique Simon DO  Dx:   Encounter Diagnosis     ICD-10-CM    1. Cervical paraspinal muscle spasm  M62.838           Start Time: 722  Stop Time: 747  Total time in clinic (min): 25 minutes    Subjective: pt reports that she is doing \"much better\". She has been compliant with HEP and is breaking up her working positions with HEP noting improvement in symptoms and ROM.      Objective: See treatment diary below      Assessment: Tolerated treatment well.  Pt tolerated manual therapy well with improved muscle tension noted. HEP reviewed and form was corrected. Progression of postural strengthening exercises was completed and tolerated well.    We will continue to progress as tolerated. Patient would benefit from continued PT      Plan: Continue per plan of care.      Eval/Re-Eval POC Expires Auth #/ Referral # Total Visits Start Date Expiration Date Extension Info Visits Limitation   3/8 5/31            50                                                 1 2 3 4 5 6   3/8 3/13       7 8 9 10 11 12           13 14 15 16 17 18           19 20 21 22 23 24           25 26 27 28 29 30                 Precautions: anxiety, MPP, RA, ADHD      Manuals 3/8 3/13           STM c/s paraspinals and UT RK RK           SOR  RK                                     Neuro Re-Ed             Scap retract 5\" x10 5\" x10           Chin tucks Gentle x10 Gentle x10           Band rows  Rtb 2x10           No monies  Rtb 2x10           Horz abd   Ytb x10                                     Ther Ex             Cervical AROM Pain free x10 ea. Pain free x10 ea.           UT stretch 10\"x5 10\"x5           LS stretch  10\"x5           UBE retro                                                                 Ther Activity                                       Gait Training                                       Modalities              "

## 2024-03-15 ENCOUNTER — OFFICE VISIT (OUTPATIENT)
Dept: PHYSICAL THERAPY | Facility: MEDICAL CENTER | Age: 34
End: 2024-03-15
Payer: COMMERCIAL

## 2024-03-15 DIAGNOSIS — M62.838 CERVICAL PARASPINAL MUSCLE SPASM: Primary | ICD-10-CM

## 2024-03-15 PROCEDURE — 97112 NEUROMUSCULAR REEDUCATION: CPT | Performed by: PHYSICAL THERAPIST

## 2024-03-15 PROCEDURE — 97140 MANUAL THERAPY 1/> REGIONS: CPT | Performed by: PHYSICAL THERAPIST

## 2024-03-15 NOTE — PROGRESS NOTES
"Daily Note     Today's date: 3/15/2024  Patient name: Angelica Beck  : 1990  MRN: [id number]  Referring provider: Monique Simon DO  Dx:   Encounter Diagnosis     ICD-10-CM    1. Cervical paraspinal muscle spasm  M62.838           Start Time: 0715  Stop Time: 0750  Total time in clinic (min): 35 minutes    Subjective: pt reports that she has some L UT tightness this morning.  She reports no complications following LV.  She reports that she has had a couple days of no symptoms.      Objective: See treatment diary below      Assessment: Tolerated treatment well.  STM completed and tolerated well.  Pt completed additional postural strengthening exercises with out complaints.  Pt will be traveling for 5 weeks without formal PT sessions.  Pt encouraged to continue HEP in this time and call PT with any questions or concerns.  Patient would benefit from continued PT      Plan: Continue per plan of care.      Eval/Re-Eval POC Expires Auth #/ Referral # Total Visits Start Date Expiration Date Extension Info Visits Limitation   3/8 5/31            50                                                 1 2 3 4 5 6   3/8 3/13 3/15      7 8 9 10 11 12           13 14 15 16 17 18           19 20 21 22 23 24           25 26 27 28 29 30                 Precautions: anxiety, MPP, RA, ADHD      Manuals 3/8 3/13 3/15          STM c/s paraspinals and UT RK RK RK          SOR  RK RK                                    Neuro Re-Ed             Scap retract 5\" x10 5\" x10 5\" x10          Chin tucks Gentle x10 Gentle x10 Gentle x10          Band rows  Rtb 2x10 Rtb 2x10          No monies  Rtb 2x10 Rtb 2x10          Horz abd   Ytb x10 Ytb x10          Band ext   Rtb 2x10          Spider gamal   Ytb 10 laps          Ther Ex             Cervical AROM Pain free x10 ea. Pain free x10 ea. Pain free x10 ea.          UT stretch 10\"x5 10\"x5 10\"x5          LS stretch  10\"x5 10\"x5          UBE retro   5' retro                                        "                       Ther Activity                                       Gait Training                                       Modalities

## 2024-04-08 DIAGNOSIS — F90.9 ATTENTION DEFICIT HYPERACTIVITY DISORDER (ADHD), UNSPECIFIED ADHD TYPE: ICD-10-CM

## 2024-04-08 DIAGNOSIS — F51.01 PRIMARY INSOMNIA: ICD-10-CM

## 2024-04-08 RX ORDER — METHYLPHENIDATE HYDROCHLORIDE 18 MG/1
18 TABLET ORAL DAILY
Qty: 30 TABLET | Refills: 0 | Status: SHIPPED | OUTPATIENT
Start: 2024-04-08

## 2024-04-08 RX ORDER — ESZOPICLONE 3 MG/1
3 TABLET, FILM COATED ORAL
Qty: 30 TABLET | Refills: 0 | Status: SHIPPED | OUTPATIENT
Start: 2024-04-08

## 2024-04-26 ENCOUNTER — OFFICE VISIT (OUTPATIENT)
Dept: FAMILY MEDICINE CLINIC | Facility: CLINIC | Age: 34
End: 2024-04-26
Payer: COMMERCIAL

## 2024-04-26 VITALS
HEART RATE: 80 BPM | BODY MASS INDEX: 28.95 KG/M2 | OXYGEN SATURATION: 100 % | RESPIRATION RATE: 12 BRPM | DIASTOLIC BLOOD PRESSURE: 80 MMHG | HEIGHT: 63 IN | WEIGHT: 163.4 LBS | SYSTOLIC BLOOD PRESSURE: 110 MMHG | TEMPERATURE: 97.7 F

## 2024-04-26 DIAGNOSIS — F90.9 ATTENTION DEFICIT HYPERACTIVITY DISORDER (ADHD), UNSPECIFIED ADHD TYPE: ICD-10-CM

## 2024-04-26 DIAGNOSIS — F41.9 ANXIETY: Primary | ICD-10-CM

## 2024-04-26 DIAGNOSIS — F33.41 RECURRENT MAJOR DEPRESSIVE DISORDER, IN PARTIAL REMISSION (HCC): ICD-10-CM

## 2024-04-26 PROBLEM — E66.09 OBESITY DUE TO EXCESS CALORIES: Status: RESOLVED | Noted: 2020-01-24 | Resolved: 2024-04-26

## 2024-04-26 PROCEDURE — 99214 OFFICE O/P EST MOD 30 MIN: CPT | Performed by: NURSE PRACTITIONER

## 2024-04-26 RX ORDER — METHYLPHENIDATE HYDROCHLORIDE 18 MG/1
TABLET, EXTENDED RELEASE ORAL
COMMUNITY
Start: 2024-04-11 | End: 2024-04-26

## 2024-04-26 RX ORDER — METHYLPHENIDATE HYDROCHLORIDE 27 MG/1
27 TABLET ORAL DAILY
Qty: 30 TABLET | Refills: 0 | Status: SHIPPED | OUTPATIENT
Start: 2024-04-26

## 2024-04-26 NOTE — PROGRESS NOTES
Name: Angelica Beck      : 1990      MRN: [id number]  Encounter Provider: SEFERINO Mccarty  Encounter Date: 2024   Encounter department: [place] [id number] [address]    Assessment & Plan     1. Anxiety  Assessment & Plan:  Doing well with occasional propranolol in therapy.      2. Attention deficit hyperactivity disorder (ADHD), unspecified ADHD type  Assessment & Plan:  Will increase Concerta to 27 mg daily.  Will return in 3 months.    Orders:  -     methylphenidate (Concerta) 27 MG ER tablet; Take 1 tablet (27 mg total) by mouth daily Max Daily Amount: 27 mg    3. Recurrent major depressive disorder, in partial remission (HCC)  Assessment & Plan:  Doing well without medications.  Continues therapy.             Subjective      Patient presents for follow-up on medications.  She does feel like the methylphenidate works much better than the Vyvanse did.  She does feel like she needs an increase of the methylphenidate.  She is seeing a therapist regularly and is doing very well with her anxiety and her depression currently.  She is sleeping well with the Lunesta.      Review of Systems   Constitutional:  Negative for chills and fever.   HENT:  Negative for ear pain and sore throat.    Eyes:  Negative for pain and visual disturbance.   Respiratory:  Negative for cough and shortness of breath.    Cardiovascular:  Negative for chest pain and palpitations.   Gastrointestinal:  Negative for abdominal pain, diarrhea, nausea and vomiting.   Genitourinary:  Negative for dysuria and hematuria.   Musculoskeletal:  Negative for arthralgias and back pain.   Skin:  Negative for color change and rash.   Neurological:  Negative for seizures and syncope.   Psychiatric/Behavioral:  Negative for hallucinations and sleep disturbance. The patient is not nervous/anxious.    All other systems reviewed and are negative.      Current Outpatient Medications on File Prior to Visit   Medication Sig  "   EPINEPHrine (EPIPEN) 0.3 mg/0.3 mL SOAJ Inject 0.3 mL (0.3 mg total) into a muscle once for 1 dose    eszopiclone (LUNESTA) 3 MG tablet Take 1 tablet (3 mg total) by mouth daily at bedtime    Ferrous Sulfate (IRON PO) Take by mouth    fluticasone (FLONASE) 50 mcg/act nasal spray 1 spray into each nostril daily    hydroxychloroquine (PLAQUENIL) 200 mg tablet Take 200 mg by mouth    predniSONE 5 mg tablet TAKE 2-3 TABLETS BY MOUTH EVERY DAY IN THE MORNING FOR 2 TO 3 DAYS AS NEEDED    [DISCONTINUED] methylphenidate (Concerta) 18 mg ER tablet Take 1 tablet (18 mg total) by mouth daily Max Daily Amount: 18 mg    cyclobenzaprine (FLEXERIL) 10 mg tablet Take 1 tablet (10 mg total) by mouth 3 (three) times a day as needed for muscle spasms    ipratropium (ATROVENT) 0.03 % nasal spray 2 sprays into each nostril 3 (three) times a day    propranolol (INDERAL) 10 mg tablet TAKE 1 TO 2 TABLETS BY MOUTH DAILY AS NEEDED FOR ANXIETY (Patient not taking: Reported on 4/26/2024)    [DISCONTINUED] Methylphenidate HCl ER 18 MG TB24  (Patient not taking: Reported on 4/26/2024)       Objective     /80   Pulse 80   Temp 97.7 °F (36.5 °C)   Resp 12   Ht 5' 3\" (1.6 m)   Wt 74.1 kg (163 lb 6.4 oz)   SpO2 100%   BMI 28.95 kg/m²     Physical Exam  Constitutional:       Appearance: She is well-developed.   Cardiovascular:      Rate and Rhythm: Normal rate and regular rhythm.      Heart sounds: Normal heart sounds. No murmur heard.  Pulmonary:      Effort: Pulmonary effort is normal. No respiratory distress.      Breath sounds: Normal breath sounds.   Skin:     General: Skin is warm and dry.   Neurological:      Mental Status: She is alert and oriented to person, place, and time.       SEFERINO Mccarty    "

## 2024-05-21 DIAGNOSIS — F51.01 PRIMARY INSOMNIA: ICD-10-CM

## 2024-05-23 RX ORDER — ESZOPICLONE 3 MG/1
3 TABLET, FILM COATED ORAL
Qty: 30 TABLET | Refills: 0 | Status: SHIPPED | OUTPATIENT
Start: 2024-05-23

## 2024-05-28 DIAGNOSIS — F90.9 ATTENTION DEFICIT HYPERACTIVITY DISORDER (ADHD), UNSPECIFIED ADHD TYPE: ICD-10-CM

## 2024-05-30 RX ORDER — METHYLPHENIDATE HYDROCHLORIDE 27 MG/1
27 TABLET ORAL DAILY
Qty: 30 TABLET | Refills: 0 | Status: SHIPPED | OUTPATIENT
Start: 2024-05-30

## 2024-06-30 DIAGNOSIS — F51.01 PRIMARY INSOMNIA: ICD-10-CM

## 2024-07-01 RX ORDER — ESZOPICLONE 3 MG/1
3 TABLET, FILM COATED ORAL
Qty: 30 TABLET | Refills: 0 | Status: SHIPPED | OUTPATIENT
Start: 2024-07-01

## 2024-07-07 ENCOUNTER — TELEPHONE (OUTPATIENT)
Age: 34
End: 2024-07-07

## 2024-07-07 NOTE — TELEPHONE ENCOUNTER
Reason for call:   [] Refill   [x] Prior Auth  [] Other:     Office:   [x] PCP/Provider -   [] Specialty/Provider -     Medication: Lunesta     Dose/Frequency: 3 mg Q HS    Quantity: 30    Pharmacy: [facility]

## 2024-07-08 ENCOUNTER — TELEPHONE (OUTPATIENT)
Dept: FAMILY MEDICINE CLINIC | Facility: CLINIC | Age: 34
End: 2024-07-08

## 2024-07-08 NOTE — TELEPHONE ENCOUNTER
I just spoke to Stephanie and they received the refill that was sent in on July 1st, but my insurance is requiring another prior authorization for eszopiclone 3mg or else they won't fill it again until next year. The pharmacist said to ask my doctor to send another prior auth and then let the pharmacy know when it's in, please and thank you!

## 2024-07-17 NOTE — TELEPHONE ENCOUNTER
PA for eszopiclone (LUNESTA) 3 MG tablet     Submitted via    []CMM-KEY   [x][facility] [employer]-Case ID # [id number]   []Faxed to plan   []Other website   []Phone call Case ID #     Office notes sent, clinical questions answered. Awaiting determination    Turnaround time for your insurance to make a decision on your Prior Authorization can take 7-21 business days.

## 2024-07-18 NOTE — TELEPHONE ENCOUNTER
Duplicate encounter created, please see telephone encounter from 7/7/2024 regarding Lunesta PA status. Please review patient's chart to see if there is already an encounter regarding the medication in question and to document anything regarding this medication in regards to anything regarding the authorization process etc before creating another encounter Thank You.

## 2024-07-18 NOTE — TELEPHONE ENCOUNTER
PA for eszopiclone (LUNESTA) 3 MG tablet   Approved     Date(s) approved 6/17/2024-7/17/2025    Case #    Patient advised by          [x] MyChart Message  [] Phone call   []LMOM  []L/M to call office as no active Communication consent on file  []Unable to leave detailed message as VM not approved on Communication consent       Pharmacy advised by    [x]Fax  []Phone call    Approval letter scanned into [employer] Yes

## 2024-07-30 ENCOUNTER — OFFICE VISIT (OUTPATIENT)
Dept: FAMILY MEDICINE CLINIC | Facility: CLINIC | Age: 34
End: 2024-07-30
Payer: COMMERCIAL

## 2024-07-30 VITALS
RESPIRATION RATE: 18 BRPM | WEIGHT: 162 LBS | HEIGHT: 63 IN | BODY MASS INDEX: 28.7 KG/M2 | HEART RATE: 63 BPM | DIASTOLIC BLOOD PRESSURE: 72 MMHG | OXYGEN SATURATION: 99 % | SYSTOLIC BLOOD PRESSURE: 100 MMHG | TEMPERATURE: 98.4 F

## 2024-07-30 DIAGNOSIS — F33.41 RECURRENT MAJOR DEPRESSIVE DISORDER, IN PARTIAL REMISSION (HCC): Primary | ICD-10-CM

## 2024-07-30 DIAGNOSIS — F90.9 ATTENTION DEFICIT HYPERACTIVITY DISORDER (ADHD), UNSPECIFIED ADHD TYPE: ICD-10-CM

## 2024-07-30 PROCEDURE — 99214 OFFICE O/P EST MOD 30 MIN: CPT | Performed by: NURSE PRACTITIONER

## 2024-07-30 RX ORDER — METHYLPHENIDATE HYDROCHLORIDE 27 MG/1
27 TABLET ORAL DAILY
Qty: 30 TABLET | Refills: 0 | Status: SHIPPED | OUTPATIENT
Start: 2024-07-30

## 2024-07-30 RX ORDER — METHYLPHENIDATE HYDROCHLORIDE 27 MG/1
27 TABLET ORAL DAILY
Qty: 30 TABLET | Refills: 0 | Status: SHIPPED | OUTPATIENT
Start: 2024-07-30 | End: 2024-07-30 | Stop reason: SDUPTHER

## 2024-07-30 NOTE — PROGRESS NOTES
Ambulatory Visit  Name: Angelica Beck      : 1990      MRN: [id number]  Encounter Provider: SEFERINO Mccarty  Encounter Date: 2024   Encounter department: [facility] [address]    Assessment & Plan   1. Recurrent major depressive disorder, in partial remission (HCC)  Assessment & Plan:  Continues with therapy.  Doing okay without medications.  2. Attention deficit hyperactivity disorder (ADHD), unspecified ADHD type  Assessment & Plan:  Continue on Concerta 27 mg daily.  Orders:  -     methylphenidate (Concerta) 27 MG ER tablet; Take 1 tablet (27 mg total) by mouth daily Max Daily Amount: 27 mg       History of Present Illness     Patient presents for routine follow-up.  She feels she is doing well on her current medication, but has not been able to get Concerta due to pharmacy shortage.  She is doing well with Lunesta at night.  She continues to see her therapist and feels like this is helpful.        Review of Systems   Constitutional:  Negative for chills and fever.   HENT:  Negative for ear pain and sore throat.    Eyes:  Negative for pain and visual disturbance.   Respiratory:  Negative for cough and shortness of breath.    Cardiovascular:  Negative for chest pain and palpitations.   Gastrointestinal:  Negative for abdominal pain and vomiting.   Genitourinary:  Negative for dysuria and hematuria.   Musculoskeletal:  Negative for arthralgias and back pain.   Skin:  Negative for color change and rash.   Neurological:  Negative for dizziness, seizures, syncope, light-headedness and headaches.   Psychiatric/Behavioral:  Positive for dysphoric mood and sleep disturbance. The patient is nervous/anxious.    All other systems reviewed and are negative.    Current Outpatient Medications on File Prior to Visit   Medication Sig Dispense Refill    cyclobenzaprine (FLEXERIL) 10 mg tablet Take 1 tablet (10 mg total) by mouth 3 (three) times a day as needed for muscle  "spasms 30 tablet 0    eszopiclone (LUNESTA) 3 MG tablet Take 1 tablet (3 mg total) by mouth daily at bedtime 30 tablet 0    Ferrous Sulfate (IRON PO) Take by mouth      fluticasone (FLONASE) 50 mcg/act nasal spray 1 spray into each nostril daily 18.2 mL 0    hydroxychloroquine (PLAQUENIL) 200 mg tablet Take 200 mg by mouth      ipratropium (ATROVENT) 0.03 % nasal spray 2 sprays into each nostril 3 (three) times a day 30 mL 0    predniSONE 5 mg tablet TAKE 2-3 TABLETS BY MOUTH EVERY DAY IN THE MORNING FOR 2 TO 3 DAYS AS NEEDED      [DISCONTINUED] methylphenidate (Concerta) 27 MG ER tablet Take 1 tablet (27 mg total) by mouth daily Max Daily Amount: 27 mg 30 tablet 0    EPINEPHrine (EPIPEN) 0.3 mg/0.3 mL SOAJ Inject 0.3 mL (0.3 mg total) into a muscle once for 1 dose 0.6 mL 0    propranolol (INDERAL) 10 mg tablet TAKE 1 TO 2 TABLETS BY MOUTH DAILY AS NEEDED FOR ANXIETY (Patient not taking: Reported on 4/26/2024) 180 tablet 0     No current facility-administered medications on file prior to visit.      Objective     /72 (BP Location: Left arm, Patient Position: Sitting, Cuff Size: Standard)   Pulse 63   Temp 98.4 °F (36.9 °C) (Tympanic)   Resp 18   Ht 5' 3\" (1.6 m)   Wt 73.5 kg (162 lb)   SpO2 99%   BMI 28.70 kg/m²     Physical Exam  Vitals and nursing note reviewed.   Constitutional:       General: She is not in acute distress.     Appearance: She is well-developed.   HENT:      Head: Normocephalic and atraumatic.   Eyes:      Conjunctiva/sclera: Conjunctivae normal.   Cardiovascular:      Rate and Rhythm: Normal rate and regular rhythm.      Heart sounds: No murmur heard.  Pulmonary:      Effort: Pulmonary effort is normal. No respiratory distress.      Breath sounds: Normal breath sounds.   Musculoskeletal:         General: No swelling.      Cervical back: Neck supple.   Skin:     General: Skin is warm and dry.      Capillary Refill: Capillary refill takes less than 2 seconds.   Neurological:      " Mental Status: She is alert and oriented to person, place, and time.   Psychiatric:         Mood and Affect: Mood normal.       Administrative Statements   I have spent a total time of 20 minutes in caring for this patient on the day of the visit/encounter including Importance of tx compliance, Reviewing / ordering tests, medicine, procedures  , and Obtaining or reviewing history  .

## 2024-08-08 DIAGNOSIS — M62.838 CERVICAL PARASPINAL MUSCLE SPASM: ICD-10-CM

## 2024-08-08 RX ORDER — CYCLOBENZAPRINE HCL 10 MG
10 TABLET ORAL 3 TIMES DAILY PRN
Qty: 30 TABLET | Refills: 0 | Status: SHIPPED | OUTPATIENT
Start: 2024-08-08 | End: 2024-08-08 | Stop reason: SDUPTHER

## 2024-08-08 RX ORDER — CYCLOBENZAPRINE HCL 10 MG
10 TABLET ORAL 3 TIMES DAILY PRN
Qty: 30 TABLET | Refills: 0 | Status: SHIPPED | OUTPATIENT
Start: 2024-08-08

## 2024-08-20 DIAGNOSIS — F51.01 PRIMARY INSOMNIA: ICD-10-CM

## 2024-08-20 RX ORDER — ESZOPICLONE 3 MG/1
3 TABLET, FILM COATED ORAL
Qty: 30 TABLET | Refills: 0 | Status: SHIPPED | OUTPATIENT
Start: 2024-08-20

## 2024-08-20 NOTE — TELEPHONE ENCOUNTER
Reason for call:   [x] Refill   [] Prior Auth  [] Other:     Office:   [x] PCP/Provider - SEFERINO Mccarty [facility] [address] [place] [address]   [] Specialty/Provider -     Medication:     eszopiclone (LUNESTA) 3 MG tablet         Take 1 tablet (3 mg total) by mouth daily at bedtime       Pharmacy: [facility] [address]     Does the patient have enough for 3 days?   [] Yes   [x] No - Send as HP to POD

## 2024-08-29 DIAGNOSIS — F90.9 ATTENTION DEFICIT HYPERACTIVITY DISORDER (ADHD), UNSPECIFIED ADHD TYPE: ICD-10-CM

## 2024-08-29 RX ORDER — METHYLPHENIDATE HYDROCHLORIDE 27 MG/1
27 TABLET ORAL DAILY
Qty: 30 TABLET | Refills: 0 | Status: SHIPPED | OUTPATIENT
Start: 2024-08-29

## 2024-09-13 DIAGNOSIS — F33.41 RECURRENT MAJOR DEPRESSIVE DISORDER, IN PARTIAL REMISSION (HCC): ICD-10-CM

## 2024-09-13 DIAGNOSIS — F41.9 ANXIETY: ICD-10-CM

## 2024-09-13 DIAGNOSIS — F41.9 ANXIETY: Primary | ICD-10-CM

## 2024-09-13 RX ORDER — FLUOXETINE 10 MG/1
10 CAPSULE ORAL DAILY
Qty: 90 CAPSULE | Refills: 0 | Status: SHIPPED | OUTPATIENT
Start: 2024-09-13 | End: 2024-09-13 | Stop reason: SDUPTHER

## 2024-09-13 RX ORDER — FLUOXETINE 10 MG/1
10 CAPSULE ORAL DAILY
Qty: 90 CAPSULE | Refills: 0 | Status: SHIPPED | OUTPATIENT
Start: 2024-09-13

## 2024-10-07 DIAGNOSIS — F90.9 ATTENTION DEFICIT HYPERACTIVITY DISORDER (ADHD), UNSPECIFIED ADHD TYPE: ICD-10-CM

## 2024-10-07 DIAGNOSIS — F51.01 PRIMARY INSOMNIA: ICD-10-CM

## 2024-10-08 RX ORDER — ESZOPICLONE 3 MG/1
3 TABLET, FILM COATED ORAL
Qty: 30 TABLET | Refills: 0 | Status: SHIPPED | OUTPATIENT
Start: 2024-10-08

## 2024-10-08 RX ORDER — METHYLPHENIDATE HYDROCHLORIDE 27 MG/1
27 TABLET ORAL DAILY
Qty: 30 TABLET | Refills: 0 | Status: SHIPPED | OUTPATIENT
Start: 2024-10-08

## 2024-10-09 ENCOUNTER — TELEPHONE (OUTPATIENT)
Age: 34
End: 2024-10-09

## 2024-10-09 NOTE — TELEPHONE ENCOUNTER
PA for methylphenidate (Concerta) 27 MG ER tablet SUBMITTED     via    []CMM-KEY:   [x]Surescripts-Case ID # [id number]   []Availity-Auth ID # NDC #   []Faxed to plan   []Other website   []Phone call Case ID #     Office notes sent, clinical questions answered. Awaiting determination    Turnaround time for your insurance to make a decision on your Prior Authorization can take 7-21 business days.

## 2024-10-10 NOTE — TELEPHONE ENCOUNTER
PA for methylphenidate (Concerta) 27 MG ER tablet  APPROVED     Date(s) approved September 9, 2024 to October 9, 2025     Case #[id number]     Patient advised by          Office already informed patient of approval via Newsbound message.     Pharmacy advised by    [x]Fax  []Phone call    Approval letter scanned into Media Yes

## 2024-10-30 ENCOUNTER — HOSPITAL ENCOUNTER (OUTPATIENT)
Dept: RADIOLOGY | Facility: HOSPITAL | Age: 34
Discharge: HOME | End: 2024-10-30
Payer: COMMERCIAL

## 2024-10-30 ENCOUNTER — APPOINTMENT (OUTPATIENT)
Dept: LAB | Facility: HOSPITAL | Age: 34
End: 2024-10-30
Payer: COMMERCIAL

## 2024-10-30 DIAGNOSIS — Z00.00 HEALTHCARE MAINTENANCE: ICD-10-CM

## 2024-10-30 DIAGNOSIS — M25.871 SESAMOIDITIS OF RIGHT FOOT: ICD-10-CM

## 2024-10-30 LAB
ALBUMIN SERPL BCG-MCNC: 4.6 G/DL (ref 3.5–5)
ALP SERPL-CCNC: 46 U/L (ref 34–104)
ALT SERPL W P-5'-P-CCNC: 18 U/L (ref 7–52)
ANION GAP SERPL CALCULATED.3IONS-SCNC: 4 MMOL/L (ref 4–13)
AST SERPL W P-5'-P-CCNC: 23 U/L (ref 13–39)
BASOPHILS # BLD AUTO: 0.05 THOUSANDS/ΜL (ref 0–0.1)
BASOPHILS NFR BLD AUTO: 1 % (ref 0–1)
BILIRUB SERPL-MCNC: 0.6 MG/DL (ref 0.2–1)
BUN SERPL-MCNC: 12 MG/DL (ref 5–25)
CALCIUM SERPL-MCNC: 9.1 MG/DL (ref 8.4–10.2)
CHLORIDE SERPL-SCNC: 107 MMOL/L (ref 96–108)
CHOLEST SERPL-MCNC: 164 MG/DL
CO2 SERPL-SCNC: 29 MMOL/L (ref 21–32)
CREAT SERPL-MCNC: 0.82 MG/DL (ref 0.6–1.3)
EOSINOPHIL # BLD AUTO: 0.1 THOUSAND/ΜL (ref 0–0.61)
EOSINOPHIL NFR BLD AUTO: 2 % (ref 0–6)
ERYTHROCYTE [DISTWIDTH] IN BLOOD BY AUTOMATED COUNT: 12.1 % (ref 11.6–15.1)
GFR SERPL CREATININE-BSD FRML MDRD: 94 ML/MIN/1.73SQ M
GLUCOSE P FAST SERPL-MCNC: 76 MG/DL (ref 65–99)
HCT VFR BLD AUTO: 42.9 % (ref 34.8–46.1)
HDLC SERPL-MCNC: 60 MG/DL
HGB BLD-MCNC: 14.3 G/DL (ref 11.5–15.4)
IMM GRANULOCYTES # BLD AUTO: 0.01 THOUSAND/UL (ref 0–0.2)
IMM GRANULOCYTES NFR BLD AUTO: 0 % (ref 0–2)
LDLC SERPL CALC-MCNC: 92 MG/DL (ref 0–100)
LYMPHOCYTES # BLD AUTO: 1.83 THOUSANDS/ΜL (ref 0.6–4.47)
LYMPHOCYTES NFR BLD AUTO: 40 % (ref 14–44)
MCH RBC QN AUTO: 29.9 PG (ref 26.8–34.3)
MCHC RBC AUTO-ENTMCNC: 33.3 G/DL (ref 31.4–37.4)
MCV RBC AUTO: 90 FL (ref 82–98)
MONOCYTES # BLD AUTO: 0.39 THOUSAND/ΜL (ref 0.17–1.22)
MONOCYTES NFR BLD AUTO: 9 % (ref 4–12)
NEUTROPHILS # BLD AUTO: 2.22 THOUSANDS/ΜL (ref 1.85–7.62)
NEUTS SEG NFR BLD AUTO: 48 % (ref 43–75)
NONHDLC SERPL-MCNC: 104 MG/DL
NRBC BLD AUTO-RTO: 0 /100 WBCS
PLATELET # BLD AUTO: 265 THOUSANDS/UL (ref 149–390)
PMV BLD AUTO: 9 FL (ref 8.9–12.7)
POTASSIUM SERPL-SCNC: 4.7 MMOL/L (ref 3.5–5.3)
PROT SERPL-MCNC: 7.1 G/DL (ref 6.4–8.4)
RBC # BLD AUTO: 4.78 MILLION/UL (ref 3.81–5.12)
SODIUM SERPL-SCNC: 140 MMOL/L (ref 135–147)
TRIGL SERPL-MCNC: 60 MG/DL
TSH SERPL DL<=0.05 MIU/L-ACNC: 2.51 UIU/ML (ref 0.45–4.5)
WBC # BLD AUTO: 4.6 THOUSAND/UL (ref 4.31–10.16)

## 2024-10-30 PROCEDURE — 36415 COLL VENOUS BLD VENIPUNCTURE: CPT

## 2024-10-30 PROCEDURE — 80053 COMPREHEN METABOLIC PANEL: CPT

## 2024-10-30 PROCEDURE — 80061 LIPID PANEL: CPT

## 2024-10-30 PROCEDURE — 73630 X-RAY EXAM OF FOOT: CPT

## 2024-10-30 PROCEDURE — 85025 COMPLETE CBC W/AUTO DIFF WBC: CPT

## 2024-10-30 PROCEDURE — 84443 ASSAY THYROID STIM HORMONE: CPT

## 2024-11-05 ENCOUNTER — OFFICE VISIT (OUTPATIENT)
Dept: FAMILY MEDICINE CLINIC | Facility: CLINIC | Age: 34
End: 2024-11-05
Payer: COMMERCIAL

## 2024-11-05 VITALS
SYSTOLIC BLOOD PRESSURE: 104 MMHG | RESPIRATION RATE: 18 BRPM | BODY MASS INDEX: 29.3 KG/M2 | OXYGEN SATURATION: 98 % | HEIGHT: 63 IN | WEIGHT: 165.4 LBS | DIASTOLIC BLOOD PRESSURE: 70 MMHG | HEART RATE: 85 BPM

## 2024-11-05 DIAGNOSIS — Z23 ENCOUNTER FOR IMMUNIZATION: ICD-10-CM

## 2024-11-05 DIAGNOSIS — F90.9 ATTENTION DEFICIT HYPERACTIVITY DISORDER (ADHD), UNSPECIFIED ADHD TYPE: ICD-10-CM

## 2024-11-05 DIAGNOSIS — F33.41 RECURRENT MAJOR DEPRESSIVE DISORDER, IN PARTIAL REMISSION (HCC): ICD-10-CM

## 2024-11-05 DIAGNOSIS — F41.9 ANXIETY: Primary | ICD-10-CM

## 2024-11-05 DIAGNOSIS — M05.9 SEROPOSITIVE RHEUMATOID ARTHRITIS (HCC): ICD-10-CM

## 2024-11-05 DIAGNOSIS — Z23 NEED FOR COVID-19 VACCINE: ICD-10-CM

## 2024-11-05 PROCEDURE — 90471 IMMUNIZATION ADMIN: CPT | Performed by: NURSE PRACTITIONER

## 2024-11-05 PROCEDURE — 90656 IIV3 VACC NO PRSV 0.5 ML IM: CPT | Performed by: NURSE PRACTITIONER

## 2024-11-05 PROCEDURE — 91320 SARSCV2 VAC 30MCG TRS-SUC IM: CPT | Performed by: NURSE PRACTITIONER

## 2024-11-05 PROCEDURE — 90480 ADMN SARSCOV2 VAC 1/ONLY CMP: CPT | Performed by: NURSE PRACTITIONER

## 2024-11-05 PROCEDURE — 99214 OFFICE O/P EST MOD 30 MIN: CPT | Performed by: NURSE PRACTITIONER

## 2024-11-05 NOTE — PROGRESS NOTES
Ambulatory Visit  Name: Angelica Beck      : 1990      MRN: [id number]  Encounter Provider: SEFERINO Mccarty  Encounter Date: 2024   Encounter department: [facility] [address]    Assessment & Plan  Encounter for immunization    Orders:    influenza vaccine preservative-free 0.5 mL IM (Fluzone, Afluria, Fluarix, Flulaval)    Need for COVID-19 vaccine    Orders:    COVID-19 Pfizer mRNA vaccine 12 yr and older (Comirnaty pre-filled syringe)    Anxiety  Will increase fluoxetine to 20 mg daily.  Advised to let me know if she needs to increase in 1 month.  Orders:    FLUoxetine (PROzac) 20 mg capsule; Take 1 capsule (20 mg total) by mouth daily    Recurrent major depressive disorder, in partial remission (HCC)  Depression Screening Follow-up Plan: Patient's depression screening was positive with a PHQ-9 score of 15. Patient assessed for underlying major depression. They have no active suicidal ideations. Brief counseling provided and recommend additional follow-up/re-evaluation next office visit.  Will increase fluoxetine to 20 mg daily.  Orders:    FLUoxetine (PROzac) 20 mg capsule; Take 1 capsule (20 mg total) by mouth daily    Attention deficit hyperactivity disorder (ADHD), unspecified ADHD type  Continue on Concerta 27 mg daily.       Seropositive rheumatoid arthritis (HCC)  Continue care with rheumatology, continue on hydroxychloroquine daily.         Depression Screening and Follow-up Plan: Patient's depression screening was positive with a PHQ-9 score of 15. Patient assessed for underlying major depression. Brief counseling provided and recommend additional follow-up/re-evaluation next office visit.       History of Present Illness     Patient presents for routine follow up. She would like to increase her fluoxetine. Depression and anxiety is 8/10.         History obtained from : patient  Review of Systems   Constitutional:  Negative for chills and fever.    HENT:  Positive for congestion. Negative for ear pain and sore throat.    Eyes:  Negative for pain and visual disturbance.   Respiratory:  Negative for cough and shortness of breath.    Cardiovascular:  Negative for chest pain and palpitations.   Gastrointestinal:  Negative for abdominal pain, diarrhea, nausea and vomiting.   Genitourinary:  Negative for dysuria, frequency and hematuria.   Musculoskeletal:  Negative for arthralgias and back pain.   Skin:  Negative for color change and rash.   Allergic/Immunologic: Positive for environmental allergies.   Neurological:  Negative for seizures and syncope.   Psychiatric/Behavioral:  Positive for dysphoric mood (8/10). Negative for sleep disturbance. The patient is nervous/anxious (8/10).    All other systems reviewed and are negative.    Current Outpatient Medications on File Prior to Visit   Medication Sig Dispense Refill    cyclobenzaprine (FLEXERIL) 10 mg tablet Take 1 tablet (10 mg total) by mouth 3 (three) times a day as needed for muscle spasms 30 tablet 0    eszopiclone (LUNESTA) 3 MG tablet Take 1 tablet (3 mg total) by mouth daily at bedtime 30 tablet 0    Ferrous Sulfate (IRON PO) Take by mouth      fluticasone (FLONASE) 50 mcg/act nasal spray 1 spray into each nostril daily 18.2 mL 0    hydroxychloroquine (PLAQUENIL) 200 mg tablet Take 200 mg by mouth      ipratropium (ATROVENT) 0.03 % nasal spray 2 sprays into each nostril 3 (three) times a day 30 mL 0    methylphenidate (Concerta) 27 MG ER tablet Take 1 tablet (27 mg total) by mouth daily Max Daily Amount: 27 mg 30 tablet 0    predniSONE 5 mg tablet TAKE 2-3 TABLETS BY MOUTH EVERY DAY IN THE MORNING FOR 2 TO 3 DAYS AS NEEDED      [DISCONTINUED] FLUoxetine (PROzac) 10 mg capsule Take 1 capsule (10 mg total) by mouth daily 90 capsule 0    EPINEPHrine (EPIPEN) 0.3 mg/0.3 mL SOAJ Inject 0.3 mL (0.3 mg total) into a muscle once for 1 dose 0.6 mL 0    [DISCONTINUED] propranolol (INDERAL) 10 mg tablet TAKE 1 TO  "2 TABLETS BY MOUTH DAILY AS NEEDED FOR ANXIETY (Patient not taking: Reported on 4/26/2024) 180 tablet 0     No current facility-administered medications on file prior to visit.          Objective     /70 (BP Location: Left arm, Patient Position: Sitting)   Pulse 85   Resp 18   Ht 5' 3\" (1.6 m)   Wt 75 kg (165 lb 6.4 oz)   SpO2 98%   BMI 29.30 kg/m²     Physical Exam  Vitals and nursing note reviewed.   Constitutional:       General: She is not in acute distress.     Appearance: She is well-developed.   HENT:      Head: Normocephalic and atraumatic.      Right Ear: Tympanic membrane normal.      Left Ear: Tympanic membrane normal.      Nose: No congestion.   Eyes:      Conjunctiva/sclera: Conjunctivae normal.   Cardiovascular:      Rate and Rhythm: Normal rate and regular rhythm.      Heart sounds: No murmur heard.  Pulmonary:      Effort: Pulmonary effort is normal. No respiratory distress.      Breath sounds: Normal breath sounds.   Abdominal:      Palpations: Abdomen is soft.      Tenderness: There is no abdominal tenderness.   Musculoskeletal:         General: No swelling.      Cervical back: Neck supple.   Skin:     General: Skin is warm and dry.      Capillary Refill: Capillary refill takes less than 2 seconds.   Neurological:      Mental Status: She is alert and oriented to person, place, and time.   Psychiatric:         Mood and Affect: Mood normal.       Administrative Statements   I have spent a total time of 15 minutes in caring for this patient on the day of the visit/encounter including Counseling / Coordination of care, Documenting in the medical record, Reviewing / ordering tests, medicine, procedures  , and Obtaining or reviewing history  .  "

## 2024-11-05 NOTE — ASSESSMENT & PLAN NOTE
Will increase fluoxetine to 20 mg daily.  Advised to let me know if she needs to increase in 1 month.  Orders:    FLUoxetine (PROzac) 20 mg capsule; Take 1 capsule (20 mg total) by mouth daily

## 2024-11-05 NOTE — ASSESSMENT & PLAN NOTE
Depression Screening Follow-up Plan: Patient's depression screening was positive with a PHQ-9 score of 15. Patient assessed for underlying major depression. They have no active suicidal ideations. Brief counseling provided and recommend additional follow-up/re-evaluation next office visit.  Will increase fluoxetine to 20 mg daily.  Orders:    FLUoxetine (PROzac) 20 mg capsule; Take 1 capsule (20 mg total) by mouth daily

## 2024-11-09 DIAGNOSIS — F51.01 PRIMARY INSOMNIA: ICD-10-CM

## 2024-11-11 RX ORDER — ESZOPICLONE 3 MG/1
3 TABLET, FILM COATED ORAL
Qty: 30 TABLET | Refills: 0 | Status: SHIPPED | OUTPATIENT
Start: 2024-11-11

## 2024-11-18 DIAGNOSIS — F90.9 ATTENTION DEFICIT HYPERACTIVITY DISORDER (ADHD), UNSPECIFIED ADHD TYPE: ICD-10-CM

## 2024-11-19 ENCOUNTER — TELEPHONE (OUTPATIENT)
Dept: FAMILY MEDICINE CLINIC | Facility: CLINIC | Age: 34
End: 2024-11-19

## 2024-11-19 RX ORDER — METHYLPHENIDATE HYDROCHLORIDE 27 MG/1
27 TABLET ORAL DAILY
Qty: 30 TABLET | Refills: 0 | Status: SHIPPED | OUTPATIENT
Start: 2024-11-19

## 2024-11-19 NOTE — TELEPHONE ENCOUNTER
PA for eszopiclone (LUNESTA) 3 MG tablet  APPROVED     Date(s) approved October 20, 2024 to November 19, 2025     Case #[id number]     Patient advised by          []Busuuhart Message  [x]Phone call   []LMOM  []L/M to call office as no active Communication consent on file  []Unable to leave detailed message as VM not approved on Communication consent       Pharmacy advised by    [x]Fax  []Phone call    Approval letter scanned into Media Yes

## 2024-11-19 NOTE — TELEPHONE ENCOUNTER
PA for eszopiclone (LUNESTA) 3 MG tablet SUBMITTED to     via    []CMM-KEY:   [x]Surescripts-Case ID # [id number]   []Availity-Auth ID # NDC #   []Faxed to plan   []Other website   []Phone call Case ID #     [x]PA sent as URGENT    All office notes, labs and other pertaining documents and studies sent. Clinical questions answered. Awaiting determination from insurance company.     Turnaround time for your insurance to make a decision on your Prior Authorization can take 7-21 business days.

## 2024-11-19 NOTE — TELEPHONE ENCOUNTER
Forwarding to PA team for review.     Pharmacy told patient that this needs a prior auth. Had to pay out of pocket when medication was picked up and wanted to get prior auth started

## 2024-12-02 DIAGNOSIS — M62.838 CERVICAL PARASPINAL MUSCLE SPASM: ICD-10-CM

## 2024-12-03 RX ORDER — CYCLOBENZAPRINE HCL 10 MG
10 TABLET ORAL 3 TIMES DAILY PRN
Qty: 30 TABLET | Refills: 0 | Status: SHIPPED | OUTPATIENT
Start: 2024-12-03

## 2024-12-19 DIAGNOSIS — F90.9 ATTENTION DEFICIT HYPERACTIVITY DISORDER (ADHD), UNSPECIFIED ADHD TYPE: ICD-10-CM

## 2024-12-19 DIAGNOSIS — F51.01 PRIMARY INSOMNIA: ICD-10-CM

## 2024-12-19 RX ORDER — METHYLPHENIDATE HYDROCHLORIDE 27 MG/1
27 TABLET ORAL DAILY
Qty: 30 TABLET | Refills: 0 | Status: SHIPPED | OUTPATIENT
Start: 2024-12-19

## 2024-12-19 RX ORDER — ESZOPICLONE 3 MG/1
3 TABLET, FILM COATED ORAL
Qty: 30 TABLET | Refills: 0 | Status: SHIPPED | OUTPATIENT
Start: 2024-12-19

## 2025-01-02 ENCOUNTER — TELEPHONE (OUTPATIENT)
Age: 35
End: 2025-01-02

## 2025-01-02 NOTE — TELEPHONE ENCOUNTER
Attempted to contact Ms. Beck again to reschedule her annual exam.  Please offer the next available appointment with either another Provider or SEFERINO Lyon and place her on the wait list.

## 2025-01-02 NOTE — TELEPHONE ENCOUNTER
Patient returned call regarding having to reschedule appointment. She does not have anything on the schedule in [place] till April. Please follow up regarding rescheduling.

## 2025-01-21 DIAGNOSIS — F90.9 ATTENTION DEFICIT HYPERACTIVITY DISORDER (ADHD), UNSPECIFIED ADHD TYPE: ICD-10-CM

## 2025-01-21 DIAGNOSIS — F51.01 PRIMARY INSOMNIA: ICD-10-CM

## 2025-01-21 RX ORDER — METHYLPHENIDATE HYDROCHLORIDE 27 MG/1
27 TABLET ORAL DAILY
Qty: 30 TABLET | Refills: 0 | Status: SHIPPED | OUTPATIENT
Start: 2025-01-21

## 2025-01-21 RX ORDER — ESZOPICLONE 3 MG/1
3 TABLET, FILM COATED ORAL
Qty: 30 TABLET | Refills: 0 | Status: SHIPPED | OUTPATIENT
Start: 2025-01-21

## 2025-02-07 ENCOUNTER — OFFICE VISIT (OUTPATIENT)
Dept: FAMILY MEDICINE CLINIC | Facility: CLINIC | Age: 35
End: 2025-02-07
Payer: COMMERCIAL

## 2025-02-07 VITALS
SYSTOLIC BLOOD PRESSURE: 128 MMHG | HEIGHT: 63 IN | DIASTOLIC BLOOD PRESSURE: 70 MMHG | WEIGHT: 166 LBS | OXYGEN SATURATION: 100 % | BODY MASS INDEX: 29.41 KG/M2 | HEART RATE: 85 BPM

## 2025-02-07 DIAGNOSIS — F41.9 ANXIETY: ICD-10-CM

## 2025-02-07 DIAGNOSIS — Z00.00 ANNUAL PHYSICAL EXAM: Primary | ICD-10-CM

## 2025-02-07 DIAGNOSIS — F90.9 ATTENTION DEFICIT HYPERACTIVITY DISORDER (ADHD), UNSPECIFIED ADHD TYPE: ICD-10-CM

## 2025-02-07 DIAGNOSIS — F33.41 RECURRENT MAJOR DEPRESSIVE DISORDER, IN PARTIAL REMISSION (HCC): ICD-10-CM

## 2025-02-07 PROCEDURE — 99395 PREV VISIT EST AGE 18-39: CPT | Performed by: NURSE PRACTITIONER

## 2025-02-07 NOTE — PROGRESS NOTES
Adult Annual Physical  Name: Angelica Beck      : 1990      MRN: [id number]  Encounter Provider: SEFERINO Mccarty  Encounter Date: 2025   Encounter department: [facility] [address]    Assessment & Plan  Recurrent major depressive disorder, in partial remission (HCC)  Continue on fluoxetine 20 mg daily.         Attention deficit hyperactivity disorder (ADHD), unspecified ADHD type  Doing okay with Concerta 27 mg daily.  She feels this is not great, but also does not want to increase medication at this time.  Will let me know if she needs to increase it at any time.       Anxiety  Doing well with Prozac daily.       Annual physical exam         Immunizations and preventive care screenings were discussed with patient today. Appropriate education was printed on patient's after visit summary.    Counseling:  Exercise: the importance of regular exercise/physical activity was discussed. Recommend exercise 3-5 times per week for at least 30 minutes.          History of Present Illness     Adult Annual Physical:  Patient presents for annual physical. Patient presents for routine follow up.  She started with cold-like symptoms a few days ago with congestion, postnasal drip, sinus pain and pressure.  She is taking Xyzal which helps some.  She does feel like her anxiety and depression is well-managed with current medications..     Diet and Physical Activity:  - Diet/Nutrition: poor diet.  - Exercise: no formal exercise.    General Health:  - Sleep: sleeps well.  - Hearing: normal hearing bilateral ears.  - Vision: wears glasses and goes for regular eye exams.  - Dental: no dental visits for > 1 year.    /GYN Health:  - Follows with GYN: yes.     Review of Systems   Constitutional:  Negative for chills and fever.   HENT:  Positive for congestion, ear pain, postnasal drip, sinus pressure, sinus pain and sore throat.    Eyes:  Negative for pain and visual disturbance.  "  Respiratory:  Positive for cough. Negative for shortness of breath.    Cardiovascular:  Negative for chest pain and palpitations.   Gastrointestinal:  Negative for abdominal pain, diarrhea, nausea and vomiting.   Genitourinary:  Negative for dysuria and hematuria.   Musculoskeletal:  Negative for arthralgias and back pain.   Skin:  Negative for color change and rash.   Neurological:  Negative for dizziness, seizures, syncope, light-headedness and headaches.   Psychiatric/Behavioral:  Positive for sleep disturbance. Negative for dysphoric mood. The patient is not nervous/anxious.    All other systems reviewed and are negative.    Current Outpatient Medications on File Prior to Visit   Medication Sig Dispense Refill    cyclobenzaprine (FLEXERIL) 10 mg tablet Take 1 tablet (10 mg total) by mouth 3 (three) times a day as needed for muscle spasms 30 tablet 0    eszopiclone (LUNESTA) 3 MG tablet Take 1 tablet (3 mg total) by mouth daily at bedtime 30 tablet 0    Ferrous Sulfate (IRON PO) Take by mouth      FLUoxetine (PROzac) 20 mg capsule Take 1 capsule (20 mg total) by mouth daily 90 capsule 1    fluticasone (FLONASE) 50 mcg/act nasal spray 1 spray into each nostril daily 18.2 mL 0    hydroxychloroquine (PLAQUENIL) 200 mg tablet Take 200 mg by mouth      ipratropium (ATROVENT) 0.03 % nasal spray 2 sprays into each nostril 3 (three) times a day 30 mL 0    methylphenidate (Concerta) 27 MG ER tablet Take 1 tablet (27 mg total) by mouth daily Max Daily Amount: 27 mg 30 tablet 0    predniSONE 5 mg tablet TAKE 2-3 TABLETS BY MOUTH EVERY DAY IN THE MORNING FOR 2 TO 3 DAYS AS NEEDED      EPINEPHrine (EPIPEN) 0.3 mg/0.3 mL SOAJ Inject 0.3 mL (0.3 mg total) into a muscle once for 1 dose 0.6 mL 0     No current facility-administered medications on file prior to visit.        Objective   /70 (BP Location: Left arm, Patient Position: Sitting, Cuff Size: Standard)   Pulse 85   Ht 5' 3\" (1.6 m)   Wt 75.3 kg (166 lb)   SpO2 " 100%   BMI 29.41 kg/m²     Physical Exam  Vitals and nursing note reviewed.   Constitutional:       General: She is not in acute distress.     Appearance: She is well-developed.   HENT:      Head: Normocephalic and atraumatic.      Right Ear: Tympanic membrane normal.      Left Ear: Tympanic membrane normal.      Nose: No congestion.      Mouth/Throat:      Pharynx: Oropharyngeal exudate present.   Eyes:      Conjunctiva/sclera: Conjunctivae normal.   Cardiovascular:      Rate and Rhythm: Normal rate and regular rhythm.      Heart sounds: No murmur heard.  Pulmonary:      Effort: Pulmonary effort is normal. No respiratory distress.      Breath sounds: Normal breath sounds.   Abdominal:      Palpations: Abdomen is soft.      Tenderness: There is no abdominal tenderness.   Musculoskeletal:         General: No swelling.      Cervical back: Neck supple.   Skin:     General: Skin is warm and dry.      Capillary Refill: Capillary refill takes less than 2 seconds.   Neurological:      Mental Status: She is alert and oriented to person, place, and time.   Psychiatric:         Mood and Affect: Mood normal.       Administrative Statements   I have spent a total time of 20 minutes in caring for this patient on the day of the visit/encounter including Documenting in the medical record, Reviewing / ordering tests, medicine, procedures  , and Obtaining or reviewing history  . Topics discussed with the patient / family include symptom assessment and management and medication review.

## 2025-02-07 NOTE — ASSESSMENT & PLAN NOTE
Doing okay with Concerta 27 mg daily.  She feels this is not great, but also does not want to increase medication at this time.  Will let me know if she needs to increase it at any time.

## 2025-02-14 NOTE — PROGRESS NOTES
Name: Angelica Beck      : 1990      MRN: [id number]  Encounter Provider: Blaire Pavon PA-C  Encounter Date: 2025   Encounter department: [facility]  :  Assessment & Plan  Encounter for gynecological examination (general) (routine) without abnormal findings  -Patient is doing well today, no concerns. Is happy with her Mirena.   -Pap up to date   -Perineal hygiene reviewed. Weight bearing exercises minium of 150 mins/weekly advised. Kegel exercises recommended. SBE encouraged, A yearly mammogram is recommended for breast cancer screening starting at age 40. ASCCP guidelines reviewed. Condoms encouraged with all sexual activity to prevent STI's. Gardisil vaccines recommended up to age 45. Calcium/ Vit D dietary requirements discussed.   -Advised to call with any issues, all concerns & questions addressed.   -See provided information in your after visit summary     RTO one year for yearly exam or sooner as needed.         Encounter for screening mammogram for high-risk patient         Screening for STD (sexually transmitted disease)    Orders:    Chlamydia/GC amplified DNA by PCR    HIV 1/2 AB/AG w Reflex [facility] for 2 yr old and above; Future    Hepatitis B surface antigen    Hepatitis C antibody    __________________________________________________________________    History of Present Illness   HPI  Angelica Beck is a 35 y.o.  presenting for annual exam.     SCREENING  Last Pap: 2024 NILM/neg, Next pap in     GYN    LMP: 10/2024 amenorrheic with Mirena     Sexually active: Yes - single partner - male  Concerns: denies pain, bleeding, dryness   Contraception: Mirena placed   STI Testing: yes, full panel    Hx Abnormal pap: reports  We reviewed ASCCP guidelines for Pap testing today.  Gardasil: She has completed the Gardasil series.    Denies vaginal discharge, itching, odor, dyspareunia, pelvic pain and vulvar/vaginal  symptoms    OB         Complaints: denies   Denies urgency, frequency, hematuria, leakage / change in stream, difficulty urinating.       BREAST  Complaints: denies   Denies: breast lump, breast tenderness, nipple discharge, skin color change, and skin lesion(s)    Pertinent Family Hx:   Family hx of breast cancer: mother, MGM both dx after 49 yo. Never had genetic testing   Family hx of ovarian cancer: no  Family hx of colon cancer: no  Family hx of uterine cancer: no      Review of Systems   Constitutional: Negative.    Respiratory: Negative.     Cardiovascular: Negative.    Gastrointestinal: Negative.    Genitourinary: Negative.    Psychiatric/Behavioral: Negative.         Past Medical History:   Diagnosis Date    Abnormal Pap smear of cervix     Allergic     sulfa based drugs    Anxiety     Arthritis 2016    rheumatoid    Chlamydia     Depression     Eating disorder     Memory loss     Rheumatoid arthritis (HCC)     Varicella          Current Outpatient Medications:     cyclobenzaprine (FLEXERIL) 10 mg tablet, Take 1 tablet (10 mg total) by mouth 3 (three) times a day as needed for muscle spasms, Disp: 30 tablet, Rfl: 0    EPINEPHrine (EPIPEN) 0.3 mg/0.3 mL SOAJ, Inject 0.3 mL (0.3 mg total) into a muscle once for 1 dose, Disp: 0.6 mL, Rfl: 0    eszopiclone (LUNESTA) 3 MG tablet, Take 1 tablet (3 mg total) by mouth daily at bedtime, Disp: 30 tablet, Rfl: 0    Ferrous Sulfate (IRON PO), Take by mouth, Disp: , Rfl:     FLUoxetine (PROzac) 20 mg capsule, Take 1 capsule (20 mg total) by mouth daily, Disp: 90 capsule, Rfl: 1    fluticasone (FLONASE) 50 mcg/act nasal spray, 1 spray into each nostril daily, Disp: 18.2 mL, Rfl: 0    hydroxychloroquine (PLAQUENIL) 200 mg tablet, Take 200 mg by mouth, Disp: , Rfl:     ipratropium (ATROVENT) 0.03 % nasal spray, 2 sprays into each nostril 3 (three) times a day, Disp: 30 mL, Rfl: 0    methylphenidate (Concerta) 27 MG ER tablet, Take 1 tablet (27 mg total) by  mouth daily Max Daily Amount: 27 mg, Disp: 30 tablet, Rfl: 0    predniSONE 5 mg tablet, TAKE 2-3 TABLETS BY MOUTH EVERY DAY IN THE MORNING FOR 2 TO 3 DAYS AS NEEDED, Disp: , Rfl:      Social History     Socioeconomic History    Marital status:      Spouse name: Not on file    Number of children: Not on file    Years of education: Not on file    Highest education level: Not on file   Occupational History    Not on file   Tobacco Use    Smoking status: Former     Current packs/day: 0.00     Average packs/day: 1 pack/day for 3.0 years (3.0 ttl pk-yrs)     Types: Cigarettes, Pipe, Cigars     Start date: 2009     Quit date: 2012     Years since quittin.2    Smokeless tobacco: Never   Vaping Use    Vaping status: Never Used   Substance and Sexual Activity    Alcohol use: Yes     Alcohol/week: 6.0 standard drinks of alcohol     Types: 6 Standard drinks or equivalent per week    Drug use: Yes     Frequency: 7.0 times per week     Types: Marijuana     Comment: have mmj card    Sexual activity: Yes     Partners: Male     Birth control/protection: I.U.D.     Comment: , together since    Other Topics Concern    Not on file   Social History Narrative    Not on file     Social Drivers of Health     Financial Resource Strain: Not on file   Food Insecurity: Not on file   Transportation Needs: Not on file   Physical Activity: Not on file   Stress: Not on file   Social Connections: Unknown (2024)    Received from [employer]    Social [employer]     How often do you feel lonely or isolated from those around you? (Adult - for ages 18 years and over): Not on file   Intimate Partner Violence: Not on file   Housing Stability: Not on file       Allergies   Allergen Reactions    Sulfa Antibiotics Rash       Past Surgical History:   Procedure Laterality Date    APPENDECTOMY      WISDOM TOOTH EXTRACTION         Objective   /74   Wt 75.8 kg (167 lb 3.2 oz)   BMI 29.62 kg/m²      Physical  Exam  Constitutional:       General: She is not in acute distress.     Appearance: Normal appearance. She is well-developed and well-groomed. She is not ill-appearing.   Genitourinary:      Bladder, rectum and urethral meatus normal.      No lesions in the vagina.      Right Labia: No rash, tenderness, lesions or skin changes.     Left Labia: No tenderness, lesions, skin changes or rash.     No vaginal discharge, erythema, tenderness or bleeding.      No vaginal prolapse present.     No vaginal atrophy present.       Right Adnexa: not tender, not full and no mass present.     Left Adnexa: not tender, not full and no mass present.     No cervical motion tenderness, discharge, friability, lesion or polyp.      Uterus is not enlarged, fixed, tender or irregular.      No uterine mass detected.     No urethral tenderness or mass present.      Bladder is not tender.    Breasts:     Breasts are symmetrical.      Right: Normal. Present. No swelling, bleeding, inverted nipple, mass, nipple discharge, skin change, tenderness or breast implant.      Left: Normal. Present. No swelling, bleeding, inverted nipple, mass, nipple discharge, skin change, tenderness or breast implant.   HENT:      Head: Normocephalic and atraumatic.   Neck:      Thyroid: No thyroid mass, thyromegaly or thyroid tenderness.   Pulmonary:      Effort: Pulmonary effort is normal.   Abdominal:      General: Abdomen is flat.   Lymphadenopathy:      Upper Body:      Right upper body: No supraclavicular or axillary adenopathy.      Left upper body: No supraclavicular or axillary adenopathy.   Neurological:      Mental Status: She is alert.   Psychiatric:         Mood and Affect: Mood normal.         Behavior: Behavior normal. Behavior is cooperative.         Thought Content: Thought content normal.         Judgment: Judgment normal.

## 2025-02-18 ENCOUNTER — ANNUAL EXAM (OUTPATIENT)
Dept: OBGYN CLINIC | Facility: CLINIC | Age: 35
End: 2025-02-18
Payer: COMMERCIAL

## 2025-02-18 VITALS — WEIGHT: 167.2 LBS | SYSTOLIC BLOOD PRESSURE: 112 MMHG | BODY MASS INDEX: 29.62 KG/M2 | DIASTOLIC BLOOD PRESSURE: 74 MMHG

## 2025-02-18 DIAGNOSIS — Z11.3 SCREENING FOR STD (SEXUALLY TRANSMITTED DISEASE): ICD-10-CM

## 2025-02-18 DIAGNOSIS — Z01.419 ENCOUNTER FOR GYNECOLOGICAL EXAMINATION (GENERAL) (ROUTINE) WITHOUT ABNORMAL FINDINGS: Primary | ICD-10-CM

## 2025-02-18 DIAGNOSIS — Z12.31 ENCOUNTER FOR SCREENING MAMMOGRAM FOR HIGH-RISK PATIENT: ICD-10-CM

## 2025-02-18 PROCEDURE — S0612 ANNUAL GYNECOLOGICAL EXAMINA: HCPCS

## 2025-02-18 PROCEDURE — 87591 N.GONORRHOEAE DNA AMP PROB: CPT

## 2025-02-18 PROCEDURE — 87491 CHLMYD TRACH DNA AMP PROBE: CPT

## 2025-02-20 DIAGNOSIS — F51.01 PRIMARY INSOMNIA: ICD-10-CM

## 2025-02-20 LAB
C TRACH DNA SPEC QL NAA+PROBE: NEGATIVE
N GONORRHOEA DNA SPEC QL NAA+PROBE: NEGATIVE

## 2025-02-20 RX ORDER — ESZOPICLONE 3 MG/1
3 TABLET, FILM COATED ORAL
Qty: 30 TABLET | Refills: 0 | Status: SHIPPED | OUTPATIENT
Start: 2025-02-20

## 2025-02-21 ENCOUNTER — RESULTS FOLLOW-UP (OUTPATIENT)
Dept: OBGYN CLINIC | Facility: CLINIC | Age: 35
End: 2025-02-21

## 2025-02-28 DIAGNOSIS — F41.9 ANXIETY: ICD-10-CM

## 2025-02-28 DIAGNOSIS — F33.41 RECURRENT MAJOR DEPRESSIVE DISORDER, IN PARTIAL REMISSION (HCC): ICD-10-CM

## 2025-02-28 DIAGNOSIS — F90.9 ATTENTION DEFICIT HYPERACTIVITY DISORDER (ADHD), UNSPECIFIED ADHD TYPE: ICD-10-CM

## 2025-03-03 RX ORDER — METHYLPHENIDATE HYDROCHLORIDE 27 MG/1
27 TABLET ORAL DAILY
Qty: 30 TABLET | Refills: 0 | Status: SHIPPED | OUTPATIENT
Start: 2025-03-03

## 2025-03-23 DIAGNOSIS — F51.01 PRIMARY INSOMNIA: ICD-10-CM

## 2025-03-25 RX ORDER — ESZOPICLONE 3 MG/1
3 TABLET, FILM COATED ORAL
Qty: 30 TABLET | Refills: 0 | Status: SHIPPED | OUTPATIENT
Start: 2025-03-25

## 2025-03-25 NOTE — TELEPHONE ENCOUNTER
Prior auth done for rybelsus via [employer], key is [id number]  
Rybelsus was denied. Insurance said FDA approved weight loss are phentermine, contrave, saxenda, wegovy and xenical with a prior authorization.     Can we send in one of these medications?  
Yes

## 2025-03-31 DIAGNOSIS — F90.9 ATTENTION DEFICIT HYPERACTIVITY DISORDER (ADHD), UNSPECIFIED ADHD TYPE: ICD-10-CM

## 2025-04-03 RX ORDER — METHYLPHENIDATE HYDROCHLORIDE 27 MG/1
27 TABLET ORAL DAILY
Qty: 30 TABLET | Refills: 0 | Status: SHIPPED | OUTPATIENT
Start: 2025-04-03

## 2025-04-06 ENCOUNTER — OFFICE VISIT (OUTPATIENT)
Age: 35
End: 2025-04-06
Payer: COMMERCIAL

## 2025-04-06 VITALS
HEART RATE: 76 BPM | OXYGEN SATURATION: 99 % | WEIGHT: 172 LBS | BODY MASS INDEX: 30.47 KG/M2 | RESPIRATION RATE: 18 BRPM | DIASTOLIC BLOOD PRESSURE: 76 MMHG | TEMPERATURE: 98.2 F | SYSTOLIC BLOOD PRESSURE: 110 MMHG

## 2025-04-06 DIAGNOSIS — N75.8 BARTHOLINITIS: Primary | ICD-10-CM

## 2025-04-06 LAB
SL AMB  POCT GLUCOSE, UA: NEGATIVE
SL AMB LEUKOCYTE ESTERASE,UA: NEGATIVE
SL AMB POCT BILIRUBIN,UA: NEGATIVE
SL AMB POCT BLOOD,UA: NEGATIVE
SL AMB POCT CLARITY,UA: NORMAL
SL AMB POCT COLOR,UA: NORMAL
SL AMB POCT KETONES,UA: NEGATIVE
SL AMB POCT NITRITE,UA: NEGATIVE
SL AMB POCT PH,UA: 5
SL AMB POCT SPECIFIC GRAVITY,UA: 1.01
SL AMB POCT URINE PROTEIN: NEGATIVE
SL AMB POCT UROBILINOGEN: 0.2

## 2025-04-06 PROCEDURE — 99213 OFFICE O/P EST LOW 20 MIN: CPT | Performed by: PHYSICIAN ASSISTANT

## 2025-04-06 PROCEDURE — 81002 URINALYSIS NONAUTO W/O SCOPE: CPT | Performed by: PHYSICIAN ASSISTANT

## 2025-04-06 PROCEDURE — 87086 URINE CULTURE/COLONY COUNT: CPT | Performed by: PHYSICIAN ASSISTANT

## 2025-04-06 RX ORDER — METRONIDAZOLE 500 MG/1
500 TABLET ORAL EVERY 8 HOURS SCHEDULED
Qty: 15 TABLET | Refills: 0 | Status: SHIPPED | OUTPATIENT
Start: 2025-04-06 | End: 2025-04-11

## 2025-04-06 RX ORDER — DOXYCYCLINE 100 MG/1
100 CAPSULE ORAL EVERY 12 HOURS SCHEDULED
Qty: 10 CAPSULE | Refills: 0 | Status: SHIPPED | OUTPATIENT
Start: 2025-04-06 | End: 2025-04-11

## 2025-04-06 NOTE — PROGRESS NOTES
[facility]        NAME: Angelica Beck is a 35 y.o. female  : 1990    MRN: [id number]  DATE: 2025  TIME: 3:40 PM    Assessment and Plan   Bartholinitis [N75.8]  1. Bartholinitis  doxycycline hyclate (VIBRAMYCIN) 100 mg capsule    metroNIDAZOLE (FLAGYL) 500 mg tablet    POCT urine dip    Urine culture    Urine culture            Patient Instructions     Urinalysis is unremarkable  Urine culture was sent    [facility]  Doxycycline 100 mg 1 capsule twice daily for 5 days.  Avoid prolonged direct UV light exposure  Metronidazole 500 mg 1 tablet every 8 hours for 5 days  Consider taking a probiotic daily while on these antibiotics  Increase fluid intake and remain well-hydrated  Follow-up with gynecologist    Follow up with PCP in 3-5 days.  Proceed to  ER if symptoms worsen.    If tests are performed, our office will contact you with results only if changes need to made to the care plan discussed with you at the visit. [facility]    Chief Complaint     Chief Complaint   Patient presents with    Cyst     Pt states she has had a bartholins cyst x4 days. Pt having some unusual discharge.          History of Present Illness       34 yo female Pt states she has had a bartholins cyst x4 days. Pt having some unusual discharge.        Review of Systems   Review of Systems   Constitutional:  Negative for activity change, appetite change, chills and fever.   Respiratory:  Negative for cough.    Gastrointestinal:  Negative for abdominal pain, diarrhea, nausea and vomiting.   Endocrine: Negative for polyuria.   Genitourinary:  Negative for dysuria, frequency, hematuria and urgency.   Musculoskeletal:  Negative for back pain.   Neurological:  Negative for headaches.         Current Medications       Current Outpatient Medications:     cyclobenzaprine (FLEXERIL) 10 mg tablet, Take 1 tablet (10 mg total) by mouth 3 (three) times a day as needed for muscle  spasms, Disp: 30 tablet, Rfl: 0    doxycycline hyclate (VIBRAMYCIN) 100 mg capsule, Take 1 capsule (100 mg total) by mouth every 12 (twelve) hours for 5 days Avoid prolong UV light exposure, Disp: 10 capsule, Rfl: 0    eszopiclone (LUNESTA) 3 MG tablet, Take 1 tablet (3 mg total) by mouth daily at bedtime, Disp: 30 tablet, Rfl: 0    Ferrous Sulfate (IRON PO), Take by mouth, Disp: , Rfl:     fluticasone (FLONASE) 50 mcg/act nasal spray, 1 spray into each nostril daily, Disp: 18.2 mL, Rfl: 0    hydroxychloroquine (PLAQUENIL) 200 mg tablet, Take 200 mg by mouth, Disp: , Rfl:     ipratropium (ATROVENT) 0.03 % nasal spray, 2 sprays into each nostril 3 (three) times a day, Disp: 30 mL, Rfl: 0    methylphenidate (Concerta) 27 MG ER tablet, Take 1 tablet (27 mg total) by mouth daily Max Daily Amount: 27 mg, Disp: 30 tablet, Rfl: 0    metroNIDAZOLE (FLAGYL) 500 mg tablet, Take 1 tablet (500 mg total) by mouth every 8 (eight) hours for 5 days, Disp: 15 tablet, Rfl: 0    predniSONE 5 mg tablet, TAKE 2-3 TABLETS BY MOUTH EVERY DAY IN THE MORNING FOR 2 TO 3 DAYS AS NEEDED, Disp: , Rfl:     EPINEPHrine (EPIPEN) 0.3 mg/0.3 mL SOAJ, Inject 0.3 mL (0.3 mg total) into a muscle once for 1 dose, Disp: 0.6 mL, Rfl: 0    FLUoxetine (PROzac) 20 mg capsule, Take 1 capsule (20 mg total) by mouth daily (Patient not taking: Reported on 4/6/2025), Disp: 90 capsule, Rfl: 1    Current Allergies     Allergies as of 04/06/2025 - Reviewed 04/06/2025   Allergen Reaction Noted    Sulfa antibiotics Rash 04/17/2000            The following portions of the patient's history were reviewed and updated as appropriate: allergies, current medications, past family history, past medical history, past social history, past surgical history and problem list.     Past Medical History:   Diagnosis Date    Abnormal Pap smear of cervix     Allergic 1991    sulfa based drugs    Anxiety     Arthritis 2016    rheumatoid    Chlamydia     Depression     Eating disorder      Memory loss     Rheumatoid arthritis (HCC)     Varicella        Past Surgical History:   Procedure Laterality Date    APPENDECTOMY      WISDOM TOOTH EXTRACTION         Family History   Problem Relation Age of Onset    Mental illness Mother     Depression Mother     Rheum arthritis Mother     Breast cancer Mother         DCIS in-suti, radial scar    Hypertension Mother     Asthma Mother         developed asthma later in life, after RA diagnosis and reaction to methrotrexate    Autoimmune disease Mother         RA    Esophageal cancer Father     Alcohol abuse Father     Mental illness Father     Depression Father     Diabetes Father     Cancer Father          of metastasized esophageal cancer 01/15/2014    Bipolar disorder Father         suspected    Drug abuse Father     Mental illness Sister     Rheum arthritis Sister     Mental illness Sister     Rheum arthritis Sister     Depression Sister     Autoimmune disease Sister         RA    Anxiety disorder Sister     Breast cancer Maternal Grandmother     Coronary artery disease Maternal Grandmother     OCD Maternal Grandmother         suspected    OCD Maternal Grandfather     Heart disease Maternal Grandfather     Depression Sister     Hypertension Sister     Anxiety disorder Sister     Mental illness Sister     Thyroid disease Sister         dx this year with hashimoto's    Colon cancer Neg Hx     Ovarian cancer Neg Hx     Cervical cancer Neg Hx     Uterine cancer Neg Hx          Medications have been verified.        Objective   /76   Pulse 76   Temp 98.2 °F (36.8 °C)   Resp 18   Wt 78 kg (172 lb)   SpO2 99%   BMI 30.47 kg/m²        Physical Exam     Physical Exam  Vitals and nursing note reviewed.   Constitutional:       General: She is not in acute distress.     Appearance: Normal appearance. She is normal weight. She is not ill-appearing, toxic-appearing or diaphoretic.   Eyes:      General: No scleral icterus.     Extraocular Movements:  Extraocular movements intact.      Conjunctiva/sclera: Conjunctivae normal.      Pupils: Pupils are equal, round, and reactive to light.   Cardiovascular:      Rate and Rhythm: Normal rate and regular rhythm.      Pulses: Normal pulses.   Pulmonary:      Effort: Pulmonary effort is normal. No respiratory distress.   Abdominal:      Tenderness: There is no right CVA tenderness or left CVA tenderness.   Musculoskeletal:         General: Normal range of motion.      Cervical back: Normal range of motion and neck supple.   Neurological:      General: No focal deficit present.      Mental Status: She is alert and oriented to person, place, and time.      Coordination: Coordination normal.      Gait: Gait normal.   Psychiatric:         Mood and Affect: Mood normal.         Behavior: Behavior normal.         Thought Content: Thought content normal.         Judgment: Judgment normal.

## 2025-04-06 NOTE — PATIENT INSTRUCTIONS
Patient Education      Urinalysis is unremarkable  Urine culture was sent  Warm [facility]  Doxycycline 100 mg 1 capsule twice daily for 5 days.  Avoid prolonged direct UV light exposure  Metronidazole 500 mg 1 tablet every 8 hours for 5 days  Consider taking a probiotic daily while on these antibiotics  Increase fluid intake and remain well-hydrated  Follow-up with gynecologist  Follow up with PCP in 3-5 days.  Proceed to  ER if symptoms worsen.  If tests are performed, our office will contact you with results only if changes need to made to the care plan discussed with you at the visit. You can review your full results on [facility].       Bartholin gland cyst   The Basics   Written by the doctors and editors at [facility]   What is a Bartholin gland cyst? -- This is a small sac of fluid that forms when the opening of a Bartholin gland is blocked. There are 2 Bartholin glands, 1 on each side, just below the opening of the vagina (figure 1).  The Bartholin glands make small amounts of fluid. The fluid helps keep the vulva (the area around the opening of the vagina) moist. If something blocks the opening of a Bartholin gland, fluid can build up and form a cyst. This usually happens in just 1 gland, not both at once.  What are the symptoms of a Bartholin gland cyst? -- You might notice a lump in your vulva, but Bartholin gland cysts often do not cause any other symptoms. If they do, the main symptoms are pain or discomfort when you walk, sit, or have sex.  If a Bartholin gland cyst gets infected, it can form an abscess. An abscess is a pocket of pus. Symptoms of a Bartholin abscess include:   Severe pain - It might be painful to walk. You also might not be able to sit or have sex.   Swelling   Redness  Should I see a doctor or nurse? -- See your doctor or nurse if:   You see or feel a lump in your vulva.   It is painful to walk, sit, or have sex.  Will I need tests? -- Your doctor or nurse might do a test  "called a \"biopsy\" to check for cancer, but this is only done in some situations. Cancer in a Bartholin gland is rare, but it can happen. For a biopsy, the doctor takes a small sample of tissue from the area. Then, they send the tissue to a lab. Another doctor looks at it under a microscope to check for cancer.  How is a Bartholin gland cyst treated? -- Treatment depends on the size of the cyst, whether it is causing symptoms, and whether the cyst is infected (abscess). If you do not have symptoms, you might not need any treatment. Otherwise, treatments can include:   Expectant management - This means trying to get the cyst to drain on its own. Your doctor or nurse might ask you to apply warm (not hot) compresses to the affected area or take sitz baths to help with this.   Draining the cyst or abscess - Your doctor might cut a small hole in the cyst to let fluid or pus out. They can also place a tiny balloon in the hole to keep it from closing completely. The balloon is connected to a tiny tube called a \"catheter\" that helps fluid drain from the Bartholin gland. The doctor takes the balloon out in about 1 month. It leaves a small opening where fluid can drain. This procedure is often done in a doctor's office. But if you have a large or deep abscess, you might need treatment in the hospital.  A sample of the pus or fluid will be sent to a lab for testing. If it shows certain types of bacteria or infections, you might need antibiotics. Often, antibiotics are not needed. But you might get them in some cases, like if you have had an abscess before, have other symptoms (such as fever), or have a sexually transmitted infection.   Surgery - Doctors can also do a small surgery if draining the cyst and putting in a balloon does not work well. They can make a new opening to help the gland drain fluid. Or they can remove the gland as well as the cyst or abscess. But surgery has a higher risk of side effects than other " treatments, so doctors only do this if the other treatments did not work.  All topics are updated as new evidence becomes available and our peer review process is complete.  This topic retrieved from [employer] on: Mar 16, 2024.  Topic 09996 Version 11.0  Release: 32.2.4 - C32.74  © 2024 UpToDate, Inc. and/or its affiliates. All rights reserved.  figure 1: Adult female external genitalia     This drawing shows the different parts of the genitals.  Graphic 96884 Version 9.0  Consumer Information Use and Disclaimer   Disclaimer: This generalized information is a limited summary of diagnosis, treatment, and/or medication information. It is not meant to be comprehensive and should be used as a tool to help the user understand and/or assess potential diagnostic and treatment options. It does NOT include all information about conditions, treatments, medications, side effects, or risks that may apply to a specific patient. It is not intended to be medical advice or a substitute for the medical advice, diagnosis, or treatment of a health care provider based on the health care provider's examination and assessment of a patient's specific and unique circumstances. Patients must speak with a health care provider for complete information about their health, medical questions, and treatment options, including any risks or benefits regarding use of medications. This information does not endorse any treatments or medications as safe, effective, or approved for treating a specific patient. UpToDate, Inc. and its affiliates disclaim any warranty or liability relating to this information or the use thereof.The use of this information is governed by the Terms of Use, available at https://www.wolterskluwer.com/en/know/clinical-effectiveness-terms. 2024© UpToDate, Inc. and its affiliates and/or licensors. All rights reserved.  Copyright   © 2024 UpToDate, Inc. and/or its affiliates. All rights reserved.

## 2025-04-07 ENCOUNTER — TELEPHONE (OUTPATIENT)
Age: 35
End: 2025-04-07

## 2025-04-07 ENCOUNTER — APPOINTMENT (OUTPATIENT)
Dept: LAB | Facility: HOSPITAL | Age: 35
End: 2025-04-07
Payer: COMMERCIAL

## 2025-04-07 DIAGNOSIS — Z11.3 SCREENING FOR STD (SEXUALLY TRANSMITTED DISEASE): ICD-10-CM

## 2025-04-07 PROCEDURE — 87340 HEPATITIS B SURFACE AG IA: CPT

## 2025-04-07 PROCEDURE — 36415 COLL VENOUS BLD VENIPUNCTURE: CPT

## 2025-04-07 PROCEDURE — 87389 HIV-1 AG W/HIV-1&-2 AB AG IA: CPT

## 2025-04-07 NOTE — TELEPHONE ENCOUNTER
"Pt calling in saying that she has an upcoming appt for tomorrow for Bartholin cyst, and pt went to urgent care she was prescribed antibiotics at the urgent care for bartholin cyst, pt saying she wasn't \"looked at the doctor just prescribed me antibiotics\". Pt wanting to know if she should start the medication. Pt was advised to take Vibramycin and flagyl.   "

## 2025-04-08 ENCOUNTER — PROCEDURE VISIT (OUTPATIENT)
Dept: OBGYN CLINIC | Facility: CLINIC | Age: 35
End: 2025-04-08
Payer: COMMERCIAL

## 2025-04-08 VITALS — DIASTOLIC BLOOD PRESSURE: 68 MMHG | WEIGHT: 174.6 LBS | SYSTOLIC BLOOD PRESSURE: 124 MMHG | BODY MASS INDEX: 30.93 KG/M2

## 2025-04-08 DIAGNOSIS — N75.1 BARTHOLIN'S GLAND ABSCESS: Primary | ICD-10-CM

## 2025-04-08 LAB
BACTERIA UR CULT: NORMAL
HBV SURFACE AG SER QL: NORMAL
HCV AB SER QL: NORMAL
HIV 1+2 AB+HIV1 P24 AG SERPL QL IA: NORMAL

## 2025-04-08 PROCEDURE — 56420 I&D BARTHOLINS GLAND ABSCESS: CPT | Performed by: OBSTETRICS & GYNECOLOGY

## 2025-04-08 NOTE — PROGRESS NOTES
"Incision and drain    Date/Time: 4/8/2025 3:30 PM    Performed by: Yakelin Ko DO  Authorized by: Yakelin Ko DO  Universal Protocol:  Consent: Verbal consent obtained.  Consent given by: patient  Patient understanding: patient states understanding of the procedure being performed  Patient identity confirmed: verbally with patient    Location:     Type:  Bartholin cyst    Size:  2.5 cm    Location:  Anogenital    Anogenital location:  Bartholin's gland (left)  Pre-procedure details:     Skin preparation:  Betadine  Anesthesia (see MAR for exact dosages):     Anesthesia method:  Local infiltration    Local anesthetic:  Lidocaine 2% w/o epi  Procedure details:     Complexity:  Simple    Incision types:  Stab incision    Scalpel blade:  11    Approach:  Open    Incision depth:  Subcutaneous    Wound management:  Probed and deloculated    Drainage:  Purulent and bloody    Drainage amount:  Moderate    Wound treatment:  Wound left open and packing placed    Packing materials:  1/4 in gauze    Amount 1/4\":  1.5 cm  Post-procedure details:     Patient tolerance of procedure:  Tolerated well, no immediate complications      "

## 2025-04-09 DIAGNOSIS — Z11.3 SCREENING EXAMINATION FOR STI: Primary | ICD-10-CM

## 2025-04-09 NOTE — TELEPHONE ENCOUNTER
Contacted pt and notified her of the recommendations as per SARA Vuong. Pt verbalized understanding no further questions

## 2025-04-22 DIAGNOSIS — F51.01 PRIMARY INSOMNIA: ICD-10-CM

## 2025-04-22 RX ORDER — ESZOPICLONE 3 MG/1
3 TABLET, FILM COATED ORAL
Qty: 30 TABLET | Refills: 0 | Status: SHIPPED | OUTPATIENT
Start: 2025-04-22

## 2025-05-01 DIAGNOSIS — F90.9 ATTENTION DEFICIT HYPERACTIVITY DISORDER (ADHD), UNSPECIFIED ADHD TYPE: ICD-10-CM

## 2025-05-01 RX ORDER — METHYLPHENIDATE HYDROCHLORIDE 27 MG/1
27 TABLET ORAL DAILY
Qty: 30 TABLET | Refills: 0 | Status: SHIPPED | OUTPATIENT
Start: 2025-05-01

## 2025-05-22 DIAGNOSIS — F51.01 PRIMARY INSOMNIA: ICD-10-CM

## 2025-05-23 RX ORDER — ESZOPICLONE 3 MG/1
3 TABLET, FILM COATED ORAL
Qty: 30 TABLET | Refills: 0 | Status: SHIPPED | OUTPATIENT
Start: 2025-05-23

## 2025-06-02 DIAGNOSIS — F90.9 ATTENTION DEFICIT HYPERACTIVITY DISORDER (ADHD), UNSPECIFIED ADHD TYPE: ICD-10-CM

## 2025-06-03 RX ORDER — METHYLPHENIDATE HYDROCHLORIDE 27 MG/1
27 TABLET ORAL DAILY
Qty: 30 TABLET | Refills: 0 | Status: SHIPPED | OUTPATIENT
Start: 2025-06-03 | End: 2025-06-13

## 2025-06-13 ENCOUNTER — TELEPHONE (OUTPATIENT)
Dept: FAMILY MEDICINE CLINIC | Facility: CLINIC | Age: 35
End: 2025-06-13

## 2025-06-13 ENCOUNTER — OFFICE VISIT (OUTPATIENT)
Dept: FAMILY MEDICINE CLINIC | Facility: CLINIC | Age: 35
End: 2025-06-13
Payer: COMMERCIAL

## 2025-06-13 VITALS
WEIGHT: 182.4 LBS | SYSTOLIC BLOOD PRESSURE: 118 MMHG | HEIGHT: 63 IN | DIASTOLIC BLOOD PRESSURE: 72 MMHG | TEMPERATURE: 98.5 F | OXYGEN SATURATION: 99 % | HEART RATE: 86 BPM | BODY MASS INDEX: 32.32 KG/M2

## 2025-06-13 DIAGNOSIS — F41.9 ANXIETY: ICD-10-CM

## 2025-06-13 DIAGNOSIS — M05.9 SEROPOSITIVE RHEUMATOID ARTHRITIS (HCC): ICD-10-CM

## 2025-06-13 DIAGNOSIS — F90.9 ATTENTION DEFICIT HYPERACTIVITY DISORDER (ADHD), UNSPECIFIED ADHD TYPE: Primary | ICD-10-CM

## 2025-06-13 DIAGNOSIS — F33.41 RECURRENT MAJOR DEPRESSIVE DISORDER, IN PARTIAL REMISSION (HCC): ICD-10-CM

## 2025-06-13 DIAGNOSIS — M54.2 NECK PAIN: ICD-10-CM

## 2025-06-13 PROCEDURE — 99214 OFFICE O/P EST MOD 30 MIN: CPT | Performed by: NURSE PRACTITIONER

## 2025-06-13 RX ORDER — MELOXICAM 15 MG/1
15 TABLET ORAL DAILY
Qty: 30 TABLET | Refills: 1 | Status: SHIPPED | OUTPATIENT
Start: 2025-06-13

## 2025-06-13 RX ORDER — DEXTROAMPHETAMINE SACCHARATE, AMPHETAMINE ASPARTATE MONOHYDRATE, DEXTROAMPHETAMINE SULFATE AND AMPHETAMINE SULFATE 3.75; 3.75; 3.75; 3.75 MG/1; MG/1; MG/1; MG/1
15 CAPSULE, EXTENDED RELEASE ORAL EVERY MORNING
Qty: 30 CAPSULE | Refills: 0 | Status: SHIPPED | OUTPATIENT
Start: 2025-06-13

## 2025-06-13 NOTE — ASSESSMENT & PLAN NOTE
Patient would like to switch back to Adderall 15 mg.  She states she did much better on this than she did with Concerta.  Advised to call the office with any issues with medication.  Will return in 3 months.  Orders:    amphetamine-dextroamphetamine (ADDERALL XR, 15MG,) 15 MG 24 hr capsule; Take 1 capsule (15 mg total) by mouth every morning Max Daily Amount: 15 mg

## 2025-06-13 NOTE — PROGRESS NOTES
Name: Angelica Beck      : 1990      MRN: [id number]  Encounter Provider: SEFERINO Mccarty  Encounter Date: 2025   Encounter department: [facility] [address]  :  Assessment & Plan  Attention deficit hyperactivity disorder (ADHD), unspecified ADHD type  Patient would like to switch back to Adderall 15 mg.  She states she did much better on this than she did with Concerta.  Advised to call the office with any issues with medication.  Will return in 3 months.  Orders:    amphetamine-dextroamphetamine (ADDERALL XR, 15MG,) 15 MG 24 hr capsule; Take 1 capsule (15 mg total) by mouth every morning Max Daily Amount: 15 mg    Seropositive rheumatoid arthritis (HCC)  Continues on hydroxychloroquine daily continue care with rheumatology.       Neck pain  Will start on meloxicam daily for at least 2 weeks.  Okay to take as needed after that.  Okay to take with Tylenol.  Orders:    meloxicam (Mobic) 15 mg tablet; Take 1 tablet (15 mg total) by mouth daily    Anxiety  Doing well with just therapy.       Recurrent major depressive disorder, in partial remission (HCC)    Doing well with therapy.  She did stop fluoxetine and really feels okay without it.              History of Present Illness   Patient presents for routine follow up. Gained about 20 lbs with her thesis and over eating and no exercise. Having right foot pain and saw podiatry with no improvement.  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  She does take Flexeril at night, but this is very sedating for her so she does not take it throughout the day.      Review of Systems   Constitutional:  Negative for chills and fever.   HENT:  Negative for ear pain and sore throat.    Eyes:  Negative for pain and visual disturbance.   Respiratory:  Negative for cough and shortness of breath.    Cardiovascular:  Negative for chest pain and palpitations.  "  Gastrointestinal:  Negative for abdominal pain and vomiting.   Genitourinary:  Negative for dysuria and hematuria.   Musculoskeletal:  Positive for arthralgias (right foot) and neck pain. Negative for back pain.   Skin:  Negative for color change and rash.   Neurological:  Negative for seizures and syncope.   Psychiatric/Behavioral:  Positive for sleep disturbance. Negative for dysphoric mood. The patient is not nervous/anxious.    All other systems reviewed and are negative.      Objective   /72 (BP Location: Left arm, Patient Position: Sitting)   Pulse 86   Temp 98.5 °F (36.9 °C)   Ht 5' 3\" (1.6 m)   Wt 82.7 kg (182 lb 6.4 oz)   SpO2 99%   BMI 32.31 kg/m²      Physical Exam  Vitals and nursing note reviewed.   Constitutional:       General: She is not in acute distress.     Appearance: She is well-developed.   HENT:      Head: Normocephalic and atraumatic.     Eyes:      Conjunctiva/sclera: Conjunctivae normal.       Cardiovascular:      Rate and Rhythm: Normal rate and regular rhythm.      Heart sounds: No murmur heard.  Pulmonary:      Effort: Pulmonary effort is normal. No respiratory distress.      Breath sounds: Normal breath sounds.   Abdominal:      Palpations: Abdomen is soft.      Tenderness: There is no abdominal tenderness.     Musculoskeletal:         General: No swelling.      Cervical back: Neck supple.     Skin:     General: Skin is warm and dry.      Capillary Refill: Capillary refill takes less than 2 seconds.     Neurological:      Mental Status: She is alert and oriented to person, place, and time.     Psychiatric:         Mood and Affect: Mood normal.         "

## 2025-06-13 NOTE — TELEPHONE ENCOUNTER
PA for (ADDERALL XR, 15MG,) 15 MG 24 hr capsule SUBMITTED to     via    []CMM-KEY:   [x]Surescripts-Case ID # [id number]   []Availity-Auth ID # NDC #   []Faxed to plan   []Other website   []Phone call Case ID #     [x]PA sent as URGENT    All office notes, labs and other pertaining documents and studies sent. Clinical questions answered. Awaiting determination from insurance company.     Turnaround time for your insurance to make a decision on your Prior Authorization can take 7-21 business days.

## 2025-06-13 NOTE — TELEPHONE ENCOUNTER
PA request for   amphetamine-dextroamphetamine (ADDERALL XR, 15MG,) 15 MG 24 hr capsule  KEY [id number]

## 2025-06-17 NOTE — TELEPHONE ENCOUNTER
PA for (ADDERALL XR, 15MG,) 15 MG 24 hr capsule  APPROVED     Date(s) approved May 14, 2025 to June 13, 2026     Case # [id number]     Patient advised by          [x]LOFTYhart Message  []Phone call   []LMOM  []L/M to call office as no active Communication consent on file  []Unable to leave detailed message as VM not approved on Communication consent       Pharmacy advised by    [x]Fax  []Phone call  []Secure Chat       Approval letter scanned into Media Yes

## 2025-06-22 DIAGNOSIS — F51.01 PRIMARY INSOMNIA: ICD-10-CM

## 2025-06-23 RX ORDER — ESZOPICLONE 3 MG/1
3 TABLET, FILM COATED ORAL
Qty: 30 TABLET | Refills: 0 | Status: SHIPPED | OUTPATIENT
Start: 2025-06-23

## 2025-07-16 DIAGNOSIS — M54.2 NECK PAIN: ICD-10-CM

## 2025-07-16 DIAGNOSIS — F90.9 ATTENTION DEFICIT HYPERACTIVITY DISORDER (ADHD), UNSPECIFIED ADHD TYPE: ICD-10-CM

## 2025-07-16 DIAGNOSIS — F51.01 PRIMARY INSOMNIA: ICD-10-CM

## 2025-07-17 RX ORDER — MELOXICAM 15 MG/1
15 TABLET ORAL DAILY
Qty: 30 TABLET | Refills: 0 | OUTPATIENT
Start: 2025-07-17

## 2025-07-17 RX ORDER — DEXTROAMPHETAMINE SACCHARATE, AMPHETAMINE ASPARTATE MONOHYDRATE, DEXTROAMPHETAMINE SULFATE AND AMPHETAMINE SULFATE 3.75; 3.75; 3.75; 3.75 MG/1; MG/1; MG/1; MG/1
15 CAPSULE, EXTENDED RELEASE ORAL EVERY MORNING
Qty: 30 CAPSULE | Refills: 0 | OUTPATIENT
Start: 2025-07-17

## 2025-07-17 RX ORDER — ESZOPICLONE 3 MG/1
3 TABLET, FILM COATED ORAL
Qty: 30 TABLET | Refills: 0 | OUTPATIENT
Start: 2025-07-17

## 2025-07-17 NOTE — TELEPHONE ENCOUNTER
She has another prescriber for her Adderall under PDMP which means she is getting scheduled medications from another office. Is she now seeing Dr. Farmer?

## 2025-07-24 DIAGNOSIS — F90.9 ATTENTION DEFICIT HYPERACTIVITY DISORDER (ADHD), UNSPECIFIED ADHD TYPE: ICD-10-CM

## 2025-07-24 RX ORDER — DEXTROAMPHETAMINE SACCHARATE, AMPHETAMINE ASPARTATE MONOHYDRATE, DEXTROAMPHETAMINE SULFATE AND AMPHETAMINE SULFATE 3.75; 3.75; 3.75; 3.75 MG/1; MG/1; MG/1; MG/1
15 CAPSULE, EXTENDED RELEASE ORAL EVERY MORNING
Qty: 30 CAPSULE | Refills: 0 | Status: SHIPPED | OUTPATIENT
Start: 2025-07-24

## 2025-07-30 DIAGNOSIS — F51.01 PRIMARY INSOMNIA: ICD-10-CM

## 2025-07-30 DIAGNOSIS — M54.2 NECK PAIN: ICD-10-CM

## 2025-07-31 RX ORDER — MELOXICAM 15 MG/1
15 TABLET ORAL DAILY
Qty: 30 TABLET | Refills: 0 | Status: SHIPPED | OUTPATIENT
Start: 2025-07-31

## 2025-07-31 RX ORDER — ESZOPICLONE 3 MG/1
3 TABLET, FILM COATED ORAL
Qty: 30 TABLET | Refills: 0 | Status: SHIPPED | OUTPATIENT
Start: 2025-07-31

## 2025-08-16 DIAGNOSIS — R09.82 PND (POST-NASAL DRIP): ICD-10-CM

## 2025-08-18 RX ORDER — FLUTICASONE PROPIONATE 50 MCG
1 SPRAY, SUSPENSION (ML) NASAL DAILY
Qty: 18.2 ML | Refills: 0 | Status: SHIPPED | OUTPATIENT
Start: 2025-08-18 | End: 2025-08-20 | Stop reason: SDUPTHER

## 2025-08-20 DIAGNOSIS — M62.838 CERVICAL PARASPINAL MUSCLE SPASM: ICD-10-CM

## 2025-08-20 DIAGNOSIS — R09.82 PND (POST-NASAL DRIP): ICD-10-CM

## 2025-08-21 RX ORDER — FLUTICASONE PROPIONATE 50 MCG
1 SPRAY, SUSPENSION (ML) NASAL DAILY
Qty: 18.2 ML | Refills: 0 | Status: SHIPPED | OUTPATIENT
Start: 2025-08-21

## 2025-08-21 RX ORDER — CYCLOBENZAPRINE HCL 10 MG
10 TABLET ORAL 3 TIMES DAILY PRN
Qty: 30 TABLET | Refills: 0 | Status: SHIPPED | OUTPATIENT
Start: 2025-08-21

## 2025-08-22 DIAGNOSIS — R09.82 PND (POST-NASAL DRIP): ICD-10-CM

## 2025-08-22 RX ORDER — IPRATROPIUM BROMIDE 21 UG/1
2 SPRAY, METERED NASAL 3 TIMES DAILY
Qty: 30 ML | Refills: 0 | Status: SHIPPED | OUTPATIENT
Start: 2025-08-22